# Patient Record
(demographics unavailable — no encounter records)

---

## 2017-08-17 NOTE — DIAGNOSTIC IMAGING REPORT
HEAD WITHOUT CONTRAST (CT)



CLINICAL HISTORY: 75 years-old Female presenting with EVALUATE ALTERED MENTAL

STATUS/WEAKNESS. 



TECHNIQUE: Multidetector CT imaging of the head was performed technique 1 IV

contrast: None. A dose lowering technique was used consistent with the

principles of ALARA (as low as reasonably achievable). 



COMPARISON:  2/7/2013.



CT DOSE (mGy.cm): The estimated cumulative dose is 638.56 mGycm.



FINDINGS: 



 topogram: Unremarkable.



Ventricles and sulci normal in size. Brain parenchyma normal in appearance with

preserved gray-white differentiation. No mass effect or midline shift. No

hemorrhage or acute territorial infarct. No extra-axial fluid collection.

Paranasal sinuses and mastoid air cells clear. Calvarium intact.    



IMPRESSION:

1.  No acute intracranial pathology. 







Electronically signed by:  Joseph Henley M.D.

8/17/2017 9:46 PM



Dictated Date/Time:  8/17/2017 9:44 PM

## 2017-08-17 NOTE — EMERGENCY ROOM VISIT NOTE
History


Report prepared by Angela:  Shazia Mclean


Under the Supervision of:  Dr. Wilbert Reed M.D.


First contact with patient:  20:31


Chief Complaint:  FALL


Stated Complaint:  FALL - LEGS GAVE OUT





History of Present Illness


The patient is a 75 year old female who presents to the Emergency Room with 

complaints of a fall that occurred around 1400 today. She reports when she 

first woke up this morning, both of her legs felt weak and she had a hard time 

getting out of bed. She was able to get to routine checkup with her PCP, Dr. Marcos. She reports she discussed her weakness with Dr. Marcos. After the 

appointment, she went to get gas when her bilateral legs "gave out" and she 

fell to the ground. She was unable to get herself up and had to wait for EMS to 

come help her. She refused to come to the ED at that time and states she was 

able to get home, where her family encouraged her to come to the ED. She denies 

hitting her head or any loss of consciousness She notes she was recently placed 

on a new medication by Dr. Moreno, her Nephrologist. The patient denies any 

recent fevers, cough or congestion or urinary symptoms.  She admits to a 

history of diabetes mellitus and neuropathy but denies any new pain or numbness 

in her legs.





   Source of History:  patient


   Onset:  1400 today


   Position:  other (global)


   Quality:  other (fall)


   Timing:  resolved


   Associated Symptoms:  No fevers, No cough (cough or congestion), No urinary 

symptoms, No numbness (bilateral legs)





Review of Systems


See HPI for pertinent positives & negatives. A total of 10 systems reviewed and 

were otherwise negative.





Past Medical & Surgical


Medical Problems:


(1) Benign hypertension


(2) Chronic kidney disease stage 3


(3) Coronary artery disease


(4) Diabetes mellitus type 2


(5) Dyslipidemia


(6) Glaucoma


(7) Gout


(8) Morbid obesity


(9) Neuropathy








Social History


Smoking Status:  Never Smoker


Drug Use:  none


Marital Status:  


Housing Status:  lives with family


Occupation Status:  retired





Current/Historical Medications


Scheduled


Allopurinol (Zyloprim), 300 MG PO QAM


Aspirin Enteric Coated (Ecotrin Or Generic), 81 MG PO QAM


Atorvastatin (Lipitor), 80 MG PO QAM


Furosemide (Lasix), 40 MG PO HS


Furosemide (Lasix), 80 MG PO QAM


Gabapentin (Neurontin), 600 MG PO QAM


Gabapentin (Neurontin), 900 MG PO HS


Insulin Aspart (Novolog Penfill), 14 UNITS SQ QA


Insulin Aspart (Novolog Penfill), 16 UNITS SQ LUNCH


Insulin Aspart (Novolog Penfill), 18 UNITS SQ SUPPER


Insulin Glargine (Lantus Solostar), 50 UNITS SQ QAM


Insulin Glargine (Lantus Solostar), 60 SC QPM


Lorazepam (Lorazepam), 2 MG PO HS


Metoprolol Succinate (Toprol Xl), 12.5 MG PO QAM


Nitroglycerin (Nitrostat), 0.4 MG UT PRN


Potassium Chloride (K-Tab), 3 TABS PO BID





Scheduled PRN


Metolazone (Zaroxolyn), 5 MG PO DAILY PRN for SWELLING


Nystatin (Nystatin Cream), 1 APPLN TOP BID PRN for RASH


Oxycodone/Acetaminophen 5MG/325MG (Percocet 5MG/325MG), 1 TABLET PO Q6H PRN for 

Pain





Allergies


Coded Allergies:  


     Sulfamethoxazole w/Trimethoprim (Verified  Allergy, Unknown, RASH, 8/17/17)





Physical Exam


Vital Signs











  Date Time  Temp Pulse Resp B/P (MAP) Pulse Ox O2 Delivery O2 Flow Rate FiO2


 


8/17/17 23:51  52 18 142/53 99 Room Air  


 


8/17/17 22:44  73      


 


8/17/17 22:00  50 18  98 Room Air  


 


8/17/17 21:57    119/63 98 Room Air  


 


8/17/17 21:45  58 16     


 


8/17/17 20:46  45      


 


8/17/17 20:44     98 Room Air  


 


8/17/17 20:40  50 16 138/45 98 Room Air  


 


8/17/17 18:54 36.8 59 20 131/48 96 Room Air  











Physical Exam


GENERAL: Patient is in no acute distress.


HEENT: No acute trauma, normocephalic atraumatic, mucous membranes moist, no 

nasal congestion, no scleral icterus.


NECK: No stridor, no adenopathy, no meningismus, trachea is midline.


LUNGS: Clear to auscultation bilaterally, no wheeze, no rhonchi, breath sounds 

equal.


HEART: Without murmurs gallops or rubs, bradycardic with a regular rhythm.


ABDOMEN: Soft, nontender, bowel sounds positive, no hernias, no peritonitis.


EXTREMITIES: No cyanosis or edema, full range of motion of all the joints 

without pain or difficulty, no signs for acute trauma.


NEUROLOGIC: Oriented x 3, no acute motor or sensory deficits, no focal weakness.


SKIN: No rash, no jaundice, no diaphoresis.





Medical Decision & Procedures


ER Provider


Diagnostic Interpretation:


Radiology results as stated below per my review and radiologist interpretation:





CHEST ONE VIEW PORTABLE





CLINICAL HISTORY: 75 years-old Female presenting with EVALUATE ALTERED MENTAL


STATUS/WEAKNESS. 





TECHNIQUE: Portable upright AP view of the chest was obtained.





COMPARISON:  4/22/2012





FINDINGS:


Atherosclerosis of the aortic arch. Cardiac silhouette within normal limits.


Lungs and pleural spaces clear. Osseous structures normal. Upper abdomen normal.





IMPRESSION:


1.  No acute cardiopulmonary disease.





Electronically signed by:  Joseph Henley M.D.


8/17/2017 9:17 PM








HEAD WITHOUT CONTRAST (CT)





CLINICAL HISTORY: 75 years-old Female presenting with EVALUATE ALTERED MENTAL


STATUS/WEAKNESS. 





TECHNIQUE: Multidetector CT imaging of the head was performed technique 1 IV


contrast: None. A dose lowering technique was used consistent with the


principles of ALARA (as low as reasonably achievable). 





COMPARISON:  2/7/2013.





CT DOSE (mGy.cm): The estimated cumulative dose is 638.56 mGycm.





FINDINGS: 





 topogram: Unremarkable.





Ventricles and sulci normal in size. Brain parenchyma normal in appearance with


preserved gray-white differentiation. No mass effect or midline shift. No


hemorrhage or acute territorial infarct. No extra-axial fluid collection.


Paranasal sinuses and mastoid air cells clear. Calvarium intact.    





IMPRESSION:


1.  No acute intracranial pathology. 





Electronically signed by:  Joseph Henley M.D.


8/17/2017 9:46 PM





Laboratory Results











Test


  8/17/17


20:21 8/17/17


22:02


 


Total Bilirubin


  0.5 mg/dl


(0.2-1) 


 


 


Alanine Aminotransferase


(ALT/SGPT) 35 U/L (12-78) 


  


 


 


Alkaline Phosphatase


  67 U/L


() 


 


 


Total Protein


  7.8 gm/dl


(6.4-8.2) 


 


 


Albumin


  3.5 gm/dl


(3.4-5.0) 


 


 


Globulin


  4.3 gm/dl


(2.5-4.0) 


 


 


Albumin/Globulin Ratio 0.8 (0.9-2)  


 


Thyroid Stimulating Hormone


(TSH) 2.230 uIu/ml


(0.300-4.500) 


 


 


Prothrombin Time


  


  10.6 SECONDS


(9.0-12.0)


 


Prothromb Time International


Ratio 


  1.0 (0.9-1.1) 


 


 


Activated Partial


Thromboplast Time 


  26.8 SECONDS


(21.0-31.0)


 


Partial Thromboplastin Ratio  1.0 


 


Magnesium Level


  


  2.5 mg/dl


(1.8-2.4)


 


Aspartate Amino Transf


(AST/SGOT) 


  34 U/L (15-37) 


 


 


Chemistry Specimen Hemolysis   





Laboratory results reviewed by me.





Medications Administered











 Medications


  (Trade)  Dose


 Ordered  Sig/Will


 Route  Start Time


 Stop Time Status Last Admin


Dose Admin


 


 Sodium Chloride  500 ml @ 


 999 mls/hr  Q31M STAT


 IV  8/17/17 20:39


 8/17/17 21:09 DC 8/17/17 20:39


999 MLS/HR


 


 Sodium Chloride  500 ml @ 


 999 mls/hr  Q31M STAT


 IV  8/17/17 21:52


 8/17/17 22:22 DC 8/17/17 21:52


999 MLS/HR


 


 Albuterol Sulfate


  (Ventolin 0.083%


 2.5MG/3ML Neb)  2.5 mg  NOW  STAT


 INH  8/17/17 22:35


 8/17/17 22:39 DC 8/17/17 22:53


2.5 MG


 


 Calcium Gluconate


 1000 mg/Sodium


 Chloride  60 ml @ 


 240 mls/hr  2345  ONCE


 IV  8/17/17 23:45


 8/17/17 23:59 DC 8/17/17 23:42


240 MLS/HR


 


 Dextrose


  (Dextrose 50%


 50ML Syringe)  50 ml  2330  ONCE


 IV  8/17/17 23:30


 8/17/17 23:31 DC 8/17/17 23:42


50 ML


 


 Insulin Human


 Regular 10 units/


 Syringe  10 ml @ 10


 mls/min  2335  ONCE


 IV  8/17/17 23:35


 8/17/17 23:36 DC 8/17/17 23:45


10 MLS/MIN











ECG


Indication:  weakness


Rate (beats per minute):  48


Rhythm:  junctional


Findings:  other (Poor R-wave progression)


Comparison ECG Date:  Compared to EKG from 9/12/12, junctional rhythm is now 

present





ED Course


2033: The patient was evaluated in room B3. A complete history and physical 

exam was performed.





2039:  ml @ 999 mls/hr IV,  ml @ 999 mls/hr IV. 





2235: Albuterol Sulfate 2.5 mg INH. 





2245: I reevaluated the patient. She is resting comfortably. I discussed my 

recommendation she remain in the hospital for further evaluation and management 

and she verbalized complete understanding and agreement.





2250: I discussed the patients case with Althea Devi. The 

patient will be further evaluated.





Medical Decision


The differential diagnoses considered include stroke or intracranial bleeding, 

debilitation, anemia, electrolyte imbalance, infection, UTI, Guillain [place] 

Syndrome, MI, thyroid disorder and dysrhythmia. 





There is no leukocytosis or concerning anemia.  Renal panel testing shows 

hyperkalemia with a potassium of 6.  There is some acute renal failure with a 

high creatinine at 2.8.  No evidence for hepatitis.  The patient appears to be 

in a euthyroid state.  EKG shows a junctional rhythm with a rate of 48.  No 

acute ischemia.  Cardiac enzyme testing times one does show a slight elevation 

to the troponin consistent with possible cardiac injury/strain.  Chest film 

does not show pneumonia or CHF.  Brain CT shows no acute bleed or mass effect.  

There is no coagulopathy.  Total CK slightly elevated but not high enough to 

diagnose rhabdomyolysis.





The patient was given an albuterol neb, she received IV saline.  These 2 

interventions were performed because of the hyperkalemia.





The patient presents with leg weakness.  She is in a junctional bradycardia and 

somewhat hyperkalemic.  There is some acute renal failure.  Admission/

observation is warranted.  I spoke with the patient and case management.  I 

spoke to the on-call hospitalist.  The patient's potassium will need to be 

followed closely.  She may require further interventions for the potassium 

based on the next potassium value.





Medication Reconcilliation


Current Medication List:  was personally reviewed by me





Blood Pressure Screening


Patient's blood pressure:  Normal blood pressure


Blood pressure disposition:  Did not require urgent referral





Consults


Time Called:  2248


Consulting Physician:  Althea Devi


Returned Call:  2250


I discussed the patients case with Althea Devi. The 

patient will be further evaluated.





Impression





 Primary Impression:  


 Acute renal failure


 Additional Impressions:  


 Weakness


 Hyperkalemia


 Bradycardia





Scribe Attestation


The scribe's documentation has been prepared under my direction and personally 

reviewed by me in its entirety. I confirm that the note above accurately 

reflects all work, treatment, procedures, and medical decision making performed 

by me.





Departure Information


Dispostion


Being Evaluated By Hospitalist





Referrals


Dedra Marcos M.D. (PCP)





Patient Instructions


[facility]





Problem Qualifiers

## 2017-08-17 NOTE — DIAGNOSTIC IMAGING REPORT
CHEST ONE VIEW PORTABLE



CLINICAL HISTORY: 75 years-old Female presenting with EVALUATE ALTERED MENTAL

STATUS/WEAKNESS. 



TECHNIQUE: Portable upright AP view of the chest was obtained.



COMPARISON:  4/22/2012



FINDINGS:

Atherosclerosis of the aortic arch. Cardiac silhouette within normal limits.

Lungs and pleural spaces clear. Osseous structures normal. Upper abdomen normal.



IMPRESSION:

1.  No acute cardiopulmonary disease.







Electronically signed by:  Joseph Henley M.D.

8/17/2017 9:17 PM



Dictated Date/Time:  8/17/2017 9:16 PM

## 2017-08-18 NOTE — NEPHROLOGY CONSULTATION
Nephrology Consultation


Date & Providers





Date of Consultation:  Aug 18, 2017.





Primary Care Provider:  Dedra Marcos M.D.





Referring Provider:





Reason for Consultation


Acute renal insufficiency, hyperkalemia





History of Present Illness


Mrs. Radah Mantilla was seen and evaluated in her hospital room this morning.  I 

discussed the case and the plan of care with Dr. Mathis this morning.  Radha 

has baseline CKD IV.  Baseline creatinine has been 1.7 - 2.1.  CKD has been 

attributed to diabetic nephropathy.  





She follows in the nephrology clinic with Dr. Moreno.  Radha was seen in the 

nephrology clinic on July 28.  She has lymphedema requiring PT treatments in 

the past as well as a history of hypokalemia associated with loop diuretics.  

Furosemide was reduced from 80 mg BID to 40 mg BID.  Potassium chloride reduced 

from 80 mEq BID to 40 mEq BID.  She started spironolactone 100 mg daily.  She 

states that she was tolerating the medication well at first.  She had repeat 

blood work on August 15 that documented a creatinine of 1.9 with a serum 

potassium of 5.4 mmol/L. 





Yesterday, she presented to Dr. Marcos's office with lower extremity weakness 

and recurrent falls.  She reports decreased intake of fluids and mild malaise 

over a few days.  Her weight and edema had been stable.  She denied any change 

in urine output.  No significant dietary changes were identified.   





Repeat laboratory studies were notable for DAVEY with a serum creatinine of 2.8 

as well as hyperkalemia at 6.0 mmol/L.  Radha presented to the ED where she 

was found to be in a junctional bradycardia.  She was placed on telemetry 

monitoring and started on a saline infusion.  Hyperkalemia was managed with 

Kayexalate, calcium gluconate and insulin. 





This morning, Radha was resting comfortably in bed.  She denies any 

lightheadedness, dizziness, syncope or presyncope.  She denies chest pain or 

palpitations.  Medical records from the inpatient and outpatient EMR as well as 

cardiology consultation were reviewed this morning.





Past Medical/Surgical History


Medical:


Anxiety disorder


Chronic kidney disease, stage IV


Coronary artery disease


Diabetes mellitus 


Lymphedema


Gout


Hyperlipidemia


Hypertension


Hypokalemia


Morbid obesity


Vitamin D deficiency


Surgical:


None reported








Allergies


Coded Allergies:  


     Sulfamethoxazole w/Trimethoprim (Verified  Allergy, Unknown, RASH, 8/17/17)





Inpatient Medications





Current Inpatient Medications








 Medications


  (Trade)  Dose


 Ordered  Sig/Will


 Route  Start Time


 Stop Time Status Last Admin


Dose Admin


 


 Heparin Sodium


  (Porcine)


  (Heparin Sq 5000


 Unit/0.5ml)  5,000 unit  Q8


 SQ  8/18/17 06:00


 9/17/17 05:59  8/18/17 05:26


5,000 UNIT


 


 Sodium Chloride  1,000 ml @ 


 75 mls/hr  [id number] ONCE


 IV  8/18/17 01:30


 8/18/17 14:49  8/18/17 03:43


75 MLS/HR


 


 Acetaminophen


  (Tylenol Tab)  650 mg  Q4H  PRN


 PO  8/18/17 01:30


 9/17/17 01:29   


 


 


 Nitroglycerin


  (Nitrostat Tab)  0.4 mg  UD  PRN


 SL  8/18/17 01:30


 9/17/17 01:29   


 


 


 Insulin Aspart


  (novoLOG ASPART)  **SLIDING


 SCALE**


 **If C...  ACHS


 SC  8/18/17 07:00


 9/17/17 06:59  8/18/17 11:35


1 UNITS


 


 Glucose


  (Glucose 40% Gel)  15-30


 GRAMS 15


 GRAMS...  UD  PRN


 PO  8/18/17 01:30


 9/17/17 01:29   


 


 


 Glucose


  (Glucose Chew


 Tab)  4-8


 Tablets 4


 Tabl...  UD  PRN


 PO  8/18/17 01:30


 9/17/17 01:29   


 


 


 Dextrose


  (Dextrose 50%


 50ML Syringe)  25-50ML OF


 50% DW IV


 FOR...  UD  PRN


 IV  8/18/17 01:30


 9/17/17 01:29   


 


 


 Glucagon


  (Glucagon Inj)  1 mg  UD  PRN


 SQ  8/18/17 01:30


 9/17/17 01:29   


 


 


 Hydromorphone HCl


  (Dilaudid Inj)  0.5 mg  Q3H  PRN


 IV  8/18/17 01:30


 9/1/17 01:29   


 


 


 Allopurinol


  (Zyloprim Tab)  300 mg  QAM


 PO  8/18/17 09:00


 9/17/17 08:59  8/18/17 08:06


300 MG


 


 Aspirin


  (Ecotrin Tab)  81 mg  QAM


 PO  8/18/17 09:00


 9/17/17 08:59  8/18/17 08:05


81 MG


 


 Gabapentin


  (Neurontin Cap)  200 mg  BID


 PO  8/18/17 09:00


 9/17/17 08:59  8/18/17 08:06


200 MG


 


 Lorazepam


  (Ativan Tab)  0.5 mg  HS  PRN


 PO  8/18/17 01:30


 9/17/17 01:29   


 


 


 Oxycodone/


 Acetaminophen


  (Percocet


 5-325mg Tab)  1 tab  Q6H  PRN


 PO  8/18/17 01:30


 9/1/17 01:29   


 


 


 Insulin Glargine


  (Lantus Solostar


 Pen)  5 units  BID


 SC  8/18/17 09:00


 9/17/17 08:59  8/18/17 08:14


5 UNITS











Social History


Smoking Status:  Never Smoker


Drug Use:  none


Marital Status:  


Housing Status:  lives alone


Occupation:  retired





Review of Systems


A complete review of systems was performed.  Pertinent positives are noted 

above. All other systems are negative.





Physical Exam











  Date Time  Temp Pulse Resp B/P (MAP) Pulse Ox O2 Delivery O2 Flow Rate FiO2


 


8/18/17 11:59 36.6 58 16 165/77 (106) 99 Room Air  


 


8/18/17 08:02 36.4 46 18 135/64 (87) 97   


 


8/18/17 08:00      Room Air  


 


8/18/17 04:30 36.9 36 18 118/76 (90) 98   





  43  150/77 (101)    





  53  153/80 (104)    


 


8/18/17 01:39 36.5 48 17 151/70 98 Room Air  


 


8/17/17 23:51  52 18 142/53 99 Room Air  


 


8/17/17 22:44  73      


 


8/17/17 22:00  50 18  98 Room Air  


 


8/17/17 21:57    119/63 98 Room Air  


 


8/17/17 21:45  58 16     


 


8/17/17 20:46  45      


 


8/17/17 20:44     98 Room Air  


 


8/17/17 20:40  50 16 138/45 98 Room Air  


 


8/17/17 18:54 36.8 59 20 131/48 96 Room Air  








General Appearance:  no apparent distress, + obese


Head:  normocephalic, atraumatic


Eyes:  normal inspection, sclerae normal


ENT:  normal ENT inspection, pharynx normal


Neck:  supple, + pertinent finding (thick, no JVD)


Respiratory/Chest:  no respiratory distress, no accessory muscle use, + 

decreased breath sounds


Cardiovascular:  no edema, no gallop, + bradycardia


Abdomen/GI:  non tender, soft, + pertinent finding (obese)


Extremities/Musculoskelatal:  normal inspection, + pedal edema


Neurologic/Psych:  alert, normal mood/affect, oriented x 3


Skin:  normal color





Laboratory Results





Last 24 Hours








Test


  8/17/17


20:21 8/17/17


22:02 8/18/17


03:15 8/18/17


03:37


 


Sodium Level 133 mmol/L   136 mmol/L  


 


Potassium Level  mmol/L  6.0 mmol/L  5.5 mmol/L  


 


Chloride Level 102 mmol/L   105 mmol/L  


 


Carbon Dioxide Level 25 mmol/L   26 mmol/L  


 


Anion Gap 6.0 mmol/L   5.0 mmol/L  


 


Blood Urea Nitrogen 96 mg/dl   88 mg/dl  


 


Creatinine 2.80 mg/dl   2.40 mg/dl  


 


Est Creatinine Clear Calc


Drug Dose 20.4 ml/min 


  


  23.4 ml/min 


  


 


 


Estimated GFR (


American) 18.4 


  


  22.1 


  


 


 


Estimated GFR (Non-


American 15.9 


  


  19.1 


  


 


 


BUN/Creatinine Ratio 34.2   36.8  


 


Random Glucose 175 mg/dl   161 mg/dl  


 


Calcium Level 10.2 mg/dl   9.9 mg/dl  


 


Magnesium Level  mg/dl  2.5 mg/dl   


 


Total Bilirubin 0.5 mg/dl    


 


Aspartate Amino Transf


(AST/SGOT)  U/L 


  34 U/L 


  


  


 


 


Alanine Aminotransferase


(ALT/SGPT) 35 U/L 


  


  


  


 


 


Alkaline Phosphatase 67 U/L    


 


Total Creatine Kinase  U/L  690 U/L  731 U/L  


 


Troponin I 0.046 ng/ml   0.059 ng/ml  


 


Total Protein 7.8 gm/dl    


 


Albumin 3.5 gm/dl    


 


Globulin 4.3 gm/dl    


 


Albumin/Globulin Ratio 0.8    


 


Thyroid Stimulating Hormone


(TSH) 2.230 uIu/ml 


  


  


  


 


 


White Blood Count  7.55 K/uL  7.70 K/uL  


 


Red Blood Count  4.74 M/uL  4.24 M/uL  


 


Hemoglobin  14.7 g/dL  13.0 g/dL  


 


Hematocrit  44.0 %  39.3 %  


 


Mean Corpuscular Volume  92.8 fL  92.7 fL  


 


Mean Corpuscular Hemoglobin  31.0 pg  30.7 pg  


 


Mean Corpuscular Hemoglobin


Concent 


  33.4 g/dl 


  33.1 g/dl 


  


 


 


Platelet Count  160 K/uL  154 K/uL  


 


Mean Platelet Volume  11.4 fL  12.0 fL  


 


Neutrophils (%) (Auto)  64.0 %  64.4 %  


 


Lymphocytes (%) (Auto)  24.4 %  22.6 %  


 


Monocytes (%) (Auto)  10.1 %  10.6 %  


 


Eosinophils (%) (Auto)  1.3 %  1.8 %  


 


Basophils (%) (Auto)  0.1 %  0.3 %  


 


Neutrophils # (Auto)  4.83 K/uL  4.96 K/uL  


 


Lymphocytes # (Auto)  1.84 K/uL  1.74 K/uL  


 


Monocytes # (Auto)  0.76 K/uL  0.82 K/uL  


 


Eosinophils # (Auto)  0.10 K/uL  0.14 K/uL  


 


Basophils # (Auto)  0.01 K/uL  0.02 K/uL  


 


RDW Standard Deviation  49.5 fL  49.4 fL  


 


RDW Coefficient of Variation  14.6 %  14.7 %  


 


Immature Granulocyte % (Auto)  0.1 %  0.3 %  


 


Immature Granulocyte # (Auto)  0.01 K/uL  0.02 K/uL  


 


Prothrombin Time  10.6 SECONDS   


 


Prothromb Time International


Ratio 


  1.0 


  


  


 


 


Activated Partial


Thromboplast Time 


  26.8 SECONDS 


  


  


 


 


Partial Thromboplastin Ratio  1.0   


 


Chemistry Specimen Hemolysis     


 


Bedside Glucose    147 mg/dl 


 


Test


  8/18/17


06:37 8/18/17


08:18 


  


 


 


Bedside Glucose 176 mg/dl    


 


Sodium Level  135 mmol/L   


 


Potassium Level  5.1 mmol/L   


 


Chloride Level  103 mmol/L   


 


Carbon Dioxide Level  26 mmol/L   


 


Anion Gap  6.0 mmol/L   


 


Blood Urea Nitrogen  89 mg/dl   


 


Creatinine  2.20 mg/dl   


 


Est Creatinine Clear Calc


Drug Dose 


  25.5 ml/min 


  


  


 


 


Estimated GFR (


American) 


  24.6 


  


  


 


 


Estimated GFR (Non-


American 


  21.2 


  


  


 


 


BUN/Creatinine Ratio  40.6   


 


Random Glucose  207 mg/dl   


 


Calcium Level  10.2 mg/dl   


 


Troponin I  0.049 ng/ml   











Impression





(1) Acute renal insufficiency


(2) Chronic kidney disease, stage IV (severe)


(3) Hyperkalemia


(4) Lymphedema


(5) Coronary artery disease


(6) Junctional bradycardia


Radha Mantilla is a 75-year-old female with obesity, diabetes mellitus, coronary 

artery disease and CKD IV.  She has chronic lower extremity lymphedema 

requiring large doses of diuretics to maintain euvolemia.  She also has a 

history of loop diuretic associated hypokalemia.  She was admitted with 

hyperkalemia, DAVEY and junctional bradycardia.  Baseline heart rate by review of 

clinic records has been 60-70 bpm.  Radha is clinically improving with medical 

management.





Hyperkalemia was managed with 30 g Kayexalate.  She also received IV calcium 

gluconate and insulin.  She was started on a saline infusion.  Diuretics have 

been held.  Oral KCl supplement stopped.  She was not taking and ACE and denied 

NSAID use.  Dietary potassium restriction was reviewed.





At this time, I would suggest stopping MIVF.  I would use furosemide 40 mg IV 

as needed to encourage a slightly negative fluid balance.  She should be 

maintained on a potassium restricted diet.  Metabolic profile will be monitored 

q 12 hours.  I/O's will be documented.  





Medications are appropriate for renal function.  Given DAVEY, I will repeat UA/

microscopy.  Given that creatinine is improving, will hold on renal imaging for 

now.





Recommendations


DAVEY:


-- Check UA/microscopy


-- Document I/O's





Hyperkalemia:


-- Hold spironolactone and KCl


-- Monitor q 12 hours


-- K restrict diet to 2000 mg daily





Junctional bradycardia:


-- Per cardiology





Lymphedema:


-- Stop NS infusion


-- Maintain slightly negative fluid balance

## 2017-08-18 NOTE — PROGRESS NOTE
Internal Med Progress Note


Date of Service:


Aug 18, 2017.


Provider Documentation:





jones presented with fall, hyperkalemia, arf on ckd stage 4 and junctional 

bradycardia. Received iv insulin with dextrose, calcium gluconate and 

Kayexalate for hyperkalemia. potassium supplements, Aldactone  held. Was given 

fluids. Hyperkalemia resolved. patient is doing fine. No complaints. Improving. 

f/u labs. Appreciate cardiology and nephrology help.


ASSESSMENT & PLAN:


[]





DVT PROPHYLAXIS


[]





DISPOSITION


[]


Vital Signs:











  Date Time  Temp Pulse Resp B/P (MAP) Pulse Ox O2 Delivery O2 Flow Rate FiO2


 


8/18/17 15:48 36.4 58 22 150/69 (96) 98 Room Air  


 


8/18/17 12:00      Room Air  


 


8/18/17 11:59 36.6 58 16 165/77 (106) 99 Room Air  


 


8/18/17 08:02 36.4 46 18 135/64 (87) 97   


 


8/18/17 08:00      Room Air  


 


8/18/17 04:30 36.9 36 18 118/76 (90) 98   





  43  150/77 (101)    





  53  153/80 (104)    


 


8/18/17 01:39 36.5 48 17 151/70 98 Room Air  


 


8/17/17 23:51  52 18 142/53 99 Room Air  


 


8/17/17 22:44  73      


 


8/17/17 22:00  50 18  98 Room Air  


 


8/17/17 21:57    119/63 98 Room Air  


 


8/17/17 21:45  58 16     


 


8/17/17 20:46  45      


 


8/17/17 20:44     98 Room Air  


 


8/17/17 20:40  50 16 138/45 98 Room Air  


 


8/17/17 18:54 36.8 59 20 131/48 96 Room Air  








Lab Results:





Results Past 24 Hours








Test


  8/17/17


20:21 8/17/17


22:02 8/18/17


03:15 8/18/17


03:37 Range/Units


 


 


Sodium Level 133  136  136-145  mmol/L


 


Potassium Level  6.0 5.5  3.5-5.1  mmol/L


 


Chloride Level 102  105    mmol/L


 


Carbon Dioxide Level 25  26  21-32  mmol/L


 


Anion Gap 6.0  5.0  3-11  mmol/L


 


Blood Urea Nitrogen 96  88  7-18  mg/dl


 


Creatinine


  2.80


  


  2.40


  


  0.60-1.20


mg/dl


 


Est Creatinine Clear Calc


Drug Dose 20.4


  


  23.4


  


   ml/min


 


 


Estimated GFR (


American) 18.4


  


  22.1


  


   


 


 


Estimated GFR (Non-


American 15.9


  


  19.1


  


   


 


 


BUN/Creatinine Ratio 34.2  36.8  10-20  


 


Random Glucose 175  161  70-99  mg/dl


 


Calcium Level 10.2  9.9  8.5-10.1  mg/dl


 


Magnesium Level  2.5   1.8-2.4  mg/dl


 


Total Bilirubin 0.5    0.2-1  mg/dl


 


Aspartate Amino Transf


(AST/SGOT) 


  34


  


  


  15-37  U/L


 


 


Alanine Aminotransferase


(ALT/SGPT) 35


  


  


  


  12-78  U/L


 


 


Alkaline Phosphatase 67      U/L


 


Total Creatine Kinase  690 731    U/L


 


Troponin I 0.046  0.059  0-0.045  ng/ml


 


Total Protein 7.8    6.4-8.2  gm/dl


 


Albumin 3.5    3.4-5.0  gm/dl


 


Globulin 4.3    2.5-4.0  gm/dl


 


Albumin/Globulin Ratio 0.8    0.9-2  


 


Thyroid Stimulating Hormone


(TSH) 2.230


  


  


  


  0.300-4.500


uIu/ml


 


White Blood Count  7.55 7.70  4.8-10.8  K/uL


 


Red Blood Count  4.74 4.24  4.2-5.4  M/uL


 


Hemoglobin  14.7 13.0  12.0-16.0  g/dL


 


Hematocrit  44.0 39.3  37-47  %


 


Mean Corpuscular Volume  92.8 92.7    fL


 


Mean Corpuscular Hemoglobin  31.0 30.7  25-34  pg


 


Mean Corpuscular Hemoglobin


Concent 


  33.4


  33.1


  


  32-36  g/dl


 


 


Platelet Count  160 154  130-400  K/uL


 


Mean Platelet Volume  11.4 12.0  7.4-10.4  fL


 


Neutrophils (%) (Auto)  64.0 64.4   %


 


Lymphocytes (%) (Auto)  24.4 22.6   %


 


Monocytes (%) (Auto)  10.1 10.6   %


 


Eosinophils (%) (Auto)  1.3 1.8   %


 


Basophils (%) (Auto)  0.1 0.3   %


 


Neutrophils # (Auto)  4.83 4.96  1.4-6.5  K/uL


 


Lymphocytes # (Auto)  1.84 1.74  1.2-3.4  K/uL


 


Monocytes # (Auto)  0.76 0.82  0.11-0.59  K/uL


 


Eosinophils # (Auto)  0.10 0.14  0-0.5  K/uL


 


Basophils # (Auto)  0.01 0.02  0-0.2  K/uL


 


RDW Standard Deviation  49.5 49.4  36.4-46.3  fL


 


RDW Coefficient of Variation  14.6 14.7  11.5-14.5  %


 


Immature Granulocyte % (Auto)  0.1 0.3   %


 


Immature Granulocyte # (Auto)  0.01 0.02  0.00-0.02  K/uL


 


Prothrombin Time


  


  10.6


  


  


  9.0-12.0


SECONDS


 


Prothromb Time International


Ratio 


  1.0


  


  


  0.9-1.1  


 


 


Activated Partial


Thromboplast Time 


  26.8


  


  


  21.0-31.0


SECONDS


 


Partial Thromboplastin Ratio  1.0    


 


Chemistry Specimen Hemolysis      


 


Bedside Glucose    147 70-90  mg/dl


 


Test


  8/18/17


06:37 8/18/17


08:18 8/18/17


11:29 8/18/17


16:06 Range/Units


 


 


Bedside Glucose 176  197  70-90  mg/dl


 


Sodium Level  135   136-145  mmol/L


 


Potassium Level  5.1  4.8 3.5-5.1  mmol/L


 


Chloride Level  103     mmol/L


 


Carbon Dioxide Level  26   21-32  mmol/L


 


Anion Gap  6.0   3-11  mmol/L


 


Blood Urea Nitrogen  89   7-18  mg/dl


 


Creatinine


  


  2.20


  


  


  0.60-1.20


mg/dl


 


Est Creatinine Clear Calc


Drug Dose 


  25.5


  


  


   ml/min


 


 


Estimated GFR (


American) 


  24.6


  


  


   


 


 


Estimated GFR (Non-


American 


  21.2


  


  


   


 


 


BUN/Creatinine Ratio  40.6   10-20  


 


Random Glucose  207   70-99  mg/dl


 


Calcium Level  10.2   8.5-10.1  mg/dl


 


Troponin I  0.049   0-0.045  ng/ml


 


Test


  8/18/17


16:22 


  


  


  Range/Units


 


 


Bedside Glucose 227    70-90  mg/dl

## 2017-08-18 NOTE — HISTORY & PHYSICAL EXAMINATION
DATE OF ADMISSION:  2017

 

PRIMARY CARE DOCTOR:  Dr. Marcos.

 

CHIEF COMPLAINT:  Fall.

 

HISTORY OF PRESENT ILLNESS:  



History obtained from patient and records.



Medical history significant for CAD post-stenting, chronic diastolic heart 
failure, EF of 55-59%

(2D echo 2017 EF 70%, hypertension, DM2  insulin requiring,

hyperlipidemia, gout, chronic renal insufficiency (baseline creatinine 1.7 to 
2.1 as per records)



Recent confinement last 2012 for

acute renal failure, volume overload, bradycardia. 



Recent [facility] outpatient visit last month.

Patient noted to be fluid overloaded.  

Diuretics deferred to nephrology as per note.

Felodipine stopped for possible contribution to leg swelling.

Imdur dose increased.

Patient stopped taking Imdur as she became sicker

after 2 days of treatment after notifying her cardiologist. 



24-hour Holter monitor contemplated to assess for significant bradyarrhythmias 

given complaints of chronic fatigue. Planned evaluation for obstructive sleep 
apnea

as well.  

Conclusions from Holter monitor done  as follows : 

dominant rhythm sinus rhythm, frequent PVCs noted, bigeminy.  

No symptoms during episode.  



Patient seen by [facility] about 3 weeks ago.  

leg swelling improving on diuretic as per note.

Outpatient potassium noted to be low.  Kidney function stable at that time,

Furosemide decreased to 40 mg twice daily, spironolactone started.  

Patient encouraged to eat bananas as per patient.

KCl supplements prescribed.  



Yesterday the patient felt weak when she got out of bed.  

No chest pain, no shortness of breath, no headache,

some dizziness.  Good appetite.

Weight loss, decreased leg swelling with home diuretic regimen.



Patient directed by PCP to the Emergency Room.  

 

MEDICAL HISTORY:  As above.  

2D echo done  2017 showed concentric LVH, basal septal thickened, sigmoid 
septum

moderate LA enlargement, mild MR.  Mild aortic valve regurgitation.

Severe mitral annular calcification.  Normal right ventricular size.  



SURGICAL HISTORY:   section, orthopedic procedures,

sphincterotomy.

 

HOME MEDICATIONS:  Include Zyloprim, aspirin, Lipitor, Lasix, Neurontin,

NovoLog, Lantus, lorazepam, Toprol, Zaroxolyn, Nystatin, Nitrostat, Percocet,

K-Tab.  

 

ALLERGIES:  BACTRIM.  

 

FAMILY HISTORY:  Heart disease, stroke, diabetes.

 

PERSONAL AND SOCIAL HISTORY:  Nonsmoker, no chronic intake of alcoholic

beverages.  Lives alone.  Daughters live close by.

 

REVIEW OF SYSTEMS:  As per HPI, all other ROS negative.

 

PHYSICAL EXAMINATION:

VITAL SIGNS:  Blood pressure was noted to be 131/48, pulse rate of 45, RR 16,

T 37 O2 sats 98 room air.  

GENERAL:  Pleasant, no respiratory distress, obese.  Looks younger for stated

age.

SKIN:   Normal color.  

HEAD, EYES, EARS, NOSE, AND THROAT:  Pink palpebral conjunctivae, dry buccal 
mucosa 

NECK:  Short neck.

LUNGS:  Decreased effort.

HEART:  Bradycardic.

ABDOMEN:  Soft.  NT

EXTREMITIES:  Some LE edema.  No tenderness.  

NEUROLOGIC:  No gross focality.

 

LABORATORY DATA:  Hemoglobin was noted to be 14.7, hematocrit 44, 

platelets 160, sodium 133, potassium 6, chloride 102, CO2 25, BUN 20,

creatinine 2.8, glucose 175.  Troponin noted to be 0.04.  



EKG as per my interpretation junctional rhythm.  



Chest x-ray showed no acute  disease.  



CT of the head no acute intracranial pathology.

 

ASSESSMENT AND PLAN:  

1.  Fall, possible orthostasis

Likely secondary to ARF on CRI, overdiuresis. 

Question of symptomatic bradycardia.



2. hyperkalemia secondary to decreased kidney function, potassium supplements, 
spironolactone.  Daily banana intake.  

3. Hypertension, currently normotensive

4. Chronic diastolic heart failure, patient on the dry side.  

5. Coronary artery disease status post stenting. 

6. DM2, insulin requiring.  Well controlled as of recent outpatient hemoglobin 
A1c of 6.7 last 2017.  

 

PLAN:  

PCU

Check orthostatic vitals

IVF, hold home diuretics, potassium supplements for now

Calcium gluconate  for hyperkalemia given junctional rhythm on EKG on 
admission.  

IV insulin to effect cellular redistribution of potassium.

Follow serum potassium 

Nephrology consult, ARF on CRI, hyperkalemia.  (Patient known to Dr. Moreno.)

Cardio consult RE worsening bradycardic episodes. Patient known to [facility]. 

Basal insulin, ISS, BG goal 140-180; patient due for hemoglobin A1c recheck.  

DVT prophylaxis. Heparin subcutaneous

DNR.

 

 

 

MTDD

## 2017-08-18 NOTE — CARDIOLOGY CONSULTATION
Cardiology Consultation


Date of Consultation:  Aug 18, 2017


History of Present Illness


Radha Mantilla is a 75 year old female seen in cardiology consultation per the 

request of Dr Washington for the evaluation of bradycardia.  The patient attended a 

routine visit with Dr. Marcos yesterday.  When she got up for the appointment 

she noted that her legs felt rubbery and weak.  She successfully made through 

the appointment however.  Per review of the progress note it was a routine 

visit and she only complained of generalized stiffness consistent with 

arthritis pains.  Her heart rate was documented to be 60 bpm at the time of the 

appointment per vital signs.  The patient went to a gas station for gas on her 

way home and she had a fall after getting out of her car and needed help 

getting up.  She then went home, and after her daughter checked up on her , the 

patient came to the emergency room for further evaluation however this is many 

hours after her call.  EKG performed last evening at 2039 hrs. revealed 

junctional rhythm at 48 bpm. her potassium was elevated on arrival at 6 mmol 

per liter.  She received calcium gluconate, dextrose, and IV insulin.  She was 

admitted to the telemetry floor and remained in a junctional rhythm throughout 

the evening for the most part in the 45-50 beat per minute range.  A repeat EKG 

performed this morning 2017 at 7:46 AM revealed intermittent sinus 

bradycardia with PACs as well as junctional escape rhythm on the same 10 second 

EKG strip.  After this EKG was performed patient has received another dose of 

calcium gluconate and has received Kayexalate.  Currently on telemetry sinus 

rhythm in the 60 beat per minute range is noted.  Her most recent potassium has 

trended down to 5.1








The patient had last been seen as an outpatient by Mark Lombardo PAC of our 

practice on 7/10/2017.  The note describes that has been ongoing issues with 

right-sided heart failure for which she has been prescribed metolazone and had 

been referred to lymphedema therapy with physical therapy.  She had been seen 

by her nephrologist, Dr. Moreno  and apparently per the report 

metolazone was discontinued due to hyperkalemia.  Furosemide was increased to 

80 mg twice a day and supplementary potassium dose was increased to 40 mEq 3 

times per day, a total of 120 mEq daily.





Past Medical/Surgical History


Problem List:


Medical Problems:


(1) Benign hypertension


(2) Chronic kidney disease stage 3


(3) Coronary artery disease


(4) Diabetes mellitus type 2


(5) Dyslipidemia


(6) Glaucoma


(7) Gout


(8) Morbid obesity


(9) Neuropathy





History


Past Medical History: 


ASCVD


NSTEMI in 2002.


Diffuse mild atherosclerotic coronary artery diease by 2002 

diagnostic cardiac catheterization by Dr. Kerwin Owens at [facility].


Single discrete high grade obstruction of the mid RCA status post PCI at 

[facility] with a 4.0 x 38 mm Penta bare metal stent.


Long 60% mid LAD stenosis extending into the origin of a large diagonal branch 

and narrowing it by 50%.


Preserved LV systolic function


Hypertension


Dyslipidemia with optimal LDL goal of < 70 mg/dL


Type II diabetes mellitus with neuropathy


Chronic kidney disease followed by Dr. Moreno


Obesity


History of gout


Chart history of anemia








Past Surgical History: 


Three prior C-sections


Spincterotomy by Dr. Brown in 2007


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 Ortega.


Elective right knee surgery in 2012. Post knee surgery she was 

hospitalized [place] [facility] [place] [facility] [place] to symptomatic bradyarrhythmias in 

association with acute kidney injury and hyperkalemia that required dopamine 

with arrhythmias primarily responding to treatment of the hyperkalemia. There 

was concern for PE though suspicion is low at [facility] secondary to negative 

peripheral duplexes, no evidence of right heart strain on echo, and negative 

troponin. She was also treated with broad spectrum antibiotics to cover for 

possible sepsis. 





Family History: Mother  with an MI at 68. Father  with a CVA at 69. One 

brother  from complications of diabetes. She has three sisters who are 

alive and well. 


Social History: Nonsmoker. No alcohol. No illegal drugs. . Three 

daughters. 








Review Of Systems


See above for pertinent positives & negatives. A total of 10 systems reviewed 

and were otherwise negative.





Allergies


Coded Allergies:  


     Sulfamethoxazole w/Trimethoprim (Verified  Allergy, Unknown, RASH, 17)





Medications





Reported Home Medications








 Medications  Dose


 Route/Sig


 Max Daily Dose Days Date Category Dose


Instructions


 


 Novolog Penfill


  (Insulin Aspart)


 100 Unit/Ml Inj  18 Units


 SQ SUPPER


    17 Reported 


 


 Nystatin Cream


  (Nystatin) 90


 Appln/30 Gm Cr  1 Appln


 TOP BID PRN


    17 Reported 


 


 Lorazepam 2 Mg Tab  2 Mg


 PO HS


    17 Reported 


 


 Percocet


 5MG/325MG


  (Oxycodone/Acetaminophen)


 Tab  1 Tablet


 PO Q6H PRN


    17 Reported  PAIN


 


 Neurontin


  (Gabapentin) 300


 Mg Cap  900 Mg


 PO HS


    17 Reported 


 


 Lantus Solostar


  (Insulin


 Glargine) 100


 Unit/Ml Inj  60


 SC QPM


    17 Reported 


 


 Lantus Solostar


  (Insulin


 Glargine) 100


 Unit/Ml Inj  50 Units


 SQ QAM


    17 Reported 


 


 Novolog Penfill


  (Insulin Aspart)


 100 Unit/Ml Inj  16 Units


 SQ LUNCH


    17 Reported 


 


 Novolog Penfill


  (Insulin Aspart)


 100 Unit/Ml Inj  14 Units


 SQ QA


    17 Reported 


 


 Lasix


  (Furosemide) 40


 Mg Tab  80 Mg


 PO QAM


    12/14/15 Reported 


 


 Zaroxolyn


  (Metolazone) 5 Mg


 Tab  5 Mg


 PO DAILY PRN


    12/14/15 Reported 


 


 K-Tab (Potassium


 Chloride) 20 Meq


 Tab  3 Tabs


 PO BID


    12/14/15 Reported 


 


 Lipitor


  (Atorvastatin


 Calcium) 80 Mg Tab  80 Mg


 PO QAM


    12/14/15 Reported 


 


 Toprol Xl


  (Metoprolol


 Succinate) 25 Mg


 Tab  12.5 Mg


 PO QAM


    13 Reported 


 


 Zyloprim


  (Allopurinol) 300


 Mg Tab  300 Mg


 PO QAM


    12 Reported 


 


 Ecotrin Or


 Generic (Aspirin)


 81 Mg Tab  81 Mg


 PO QAM


    12 Reported 


 


 Lasix


  (Furosemide) 40


 Mg Tab  40 Mg


 PO HS


    12 Reported 


 


 Neurontin


  (Gabapentin) 300


 Mg Cap  600 Mg


 PO QAM


    12 Reported 


 


 Nitrostat


  (Nitroglycerin)


 0.4 Mg Tab  0.4 Mg


 UT PRN


    12 Reported 











Physical Exam


Vital Signs (Last 8hrs):





Last 8 Hrs








  Date Time  Temp Pulse Resp B/P (MAP) Pulse Ox O2 Delivery O2 Flow Rate FiO2


 


17 08:02 36.4 46 18 135/64 (87) 97   


 


17 04:30 36.9 36 18 118/76 (90) 98   





  43  150/77 (101)    





  53  153/80 (104)    








General Appearance: Alert and Oriented x3. NAD. 


Head: Normocephalic Atraumatic. 


Eyes: PERRLA, EOMI, conjunctiva and sclera clear


Neck:  Supple. No carotid bruits noted. No JVD. No HJD. 


Respiratory: Breath sounds clear to auscultation bilaterally. No w/r/r. 


Cardiovascular: Reg rate and rhythm. S1 and S2 noted. No murmurs, rubs, 

gallops. PMI non displace. 


Abdomen: Normal bowel sounds, soft nontender. no abdominal bruits. 


Extremities:  trace edema 


Neuro:  No focal deficits. 


Psychiatric: Normal affect.





Data





17 03:15








Red Blood Count 4.24, Mean Corpuscular Volume 92.7, Mean Corpuscular Hemoglobin 

30.7, Mean Corpuscular Hemoglobin Concent 33.1, Mean Platelet Volume 12.0, 

Neutrophils (%) (Auto) 64.4, Lymphocytes (%) (Auto) 22.6, Monocytes (%) (Auto) 

10.6, Eosinophils (%) (Auto) 1.8, Basophils (%) (Auto) 0.3, Neutrophils # (Auto

) 4.96, Lymphocytes # (Auto) 1.74, Monocytes # (Auto) 0.82, Eosinophils # (Auto

) 0.14, Basophils # (Auto) 0.02





Last Resulted


17 08:18











Past 24 Hours








Test


  17


20:21 17


22:02 17


03:15 17


08:18 Range/Units


 


 


Total Creatine Kinase   690 H 731 H    U/L


 


Troponin I 0.046 *H  0.059 *H 0.049 *H 0-0.045  ng/ml


 


Prothromb Time International


Ratio 


  1.0 


  


  


  0.9-1.1  


 


 


Prothrombin Time


  


  10.6 


  


  


  9.0-12.0


SECONDS











EKG:as per HPI





Assessment & Plan


Summary of transthoracic echocardiogram performed on 17 at [facility]:


The LV wall thickness is moderately increased (concentric).


The basal septum is thickened and angulated consistent with sigmoid septum.


The qualitative LV ejection fraction is 5559%


(normal).


The right ventricular systolic function is normal as assessed by tricuspid 

annular plane systolic excursion (TAPSE)


(normal >1.7 cm).


The left atrium is moderately enlarged.


The right atrial size is normal.


There is severe mitral annular calcification.


Mild mitral regurgitation is present.


Mild aortic valve sclerosis is present.


Mild aortic valve regurgitation is present.





Impression:


1.  Transient junctional bradycardia in the setting of hyperkalemia, improved 

at present


2.  Hypoxia history of volume overload, right heart failure, on high-dose 

diuretic and high dose potassium replacement


3.  Acute kidney injury on chronic kidney disease creatinine performed on  had been 1.7 and the creatinine is 2.2 mg/dL today.





Plan:


Recommend holding metoprolol.  Hospitalist service and nephrology input 

appreciated regarding management of hyperkalemia.


She had a transthoracic echocardiogram recently in our office on 17 with 

results as above and therefore do not think we need to repeat this at present.


I think her mild troponin elevation as well as CPK elevation is likely due to 

her acute kidney injury and fall, and I do not think this represents an acute 

coronary syndrome or decompensated congestive heart failure.





Looking ahead, will need  to be cautious in determining her ongoing diuretic 

dose and ongoing potassium supplementation dose.








Patient will be reassessed as her hospital stay develops regarding timing of 

reinitiating metoprolol.

## 2017-08-19 NOTE — CARDIOLOGY FOLLOW-UP
Subjective


General


Date of Service:


Aug 19, 2017.


Pt evaluation today including:  conversation w/ patient, conversation w/ family

, physical exam, chart review, lab review, review of studies, review of 

inpatient medication list





History of Present Illness


The patient is a 75 year old female seen in follow-up.


Patient feeling well from a cardiovascular standpoint.


Received a dose of Bumex this morning.


Edema unchanged.


Denies chest pain or unusual shortness of breath.


Remains in sinus rhythm on telemetry.


Toprol-XL remains on hold.





Allergies


Coded Allergies:  


     Sulfamethoxazole w/Trimethoprim (Verified  Allergy, Unknown, RASH, 8/17/17)





Social History


Smoking Status:  Never Smoker


Hx Tobacco Use In Past Year?:  No


Hx Alcohol Use - Type And Amou:  No


Hx Substance Use - Type And Am:  No





Problem List


Medical Problems:


(1) Acute renal failure


Status: Acute  





(2) Bradycardia


Status: Acute  





(3) Hyperkalemia


Status: Acute  





(4) Weakness


Status: Acute  











Review of Systems


Respiratory:  No cough, No sputum, No wheezing, No shortness of breath, No 

dyspnea on exertion, No dyspnea at rest, No hemoptysis


Cardiac:  + edema, No chest pain, No orthopnea, No PND, No claudication, No 

palpitations





Physical Exam


Vital Signs





Last Vital Signs Documentation








  Date Time  Temp Pulse Resp B/P (MAP) Pulse Ox O2 Delivery O2 Flow Rate FiO2


 


8/19/17 12:44     98 Room Air  


 


8/19/17 11:59 36.5 65 18 167/79 (108)    











Physical Exam


Constitutional:  


   General Apperance:  obese


   Level of Distress:  NAD, chronically ill


Head:  normocephalic


ENMT:  normal ENT inspection


Neck:  supple, trachea midline


Lungs:  


   Auscultation:  breath sounds normal, no wheezing, no rales/crackles, no 

rhonchi


Cardiovascular:  


   Heart Auscultation:  RRR, normal S1, normal S2, no murmurs


Abdomen:  


   Inspection & Palpation:  soft, non-distended, no tenderness, guarding & 

rebound


Extremities:  no cyanosis, no clubbing, no ulcers, edema (1+ B/L pedal, +B/L 

TEDs)


Neurologic:  


   Gait & Station:  pertinent finding (No focal motor deficit)


   Cranial Nerves:  grossly intact





Assessment and Plan


Assessment and Plan


Impression:


1.  Transient junctional bradycardia in the setting of hyperkalemia


     -Resolved; serum potassium within normal limits


     -Low-dose Toprol remains on hold


2.  History of volume overload and right heart failure treated with high-dose 

diuretic and high dose potassium replacement.


     -Repeat echocardiogram performed during hospitalization demonstrates 

normal right ventricular size and function


     -Lasix transition to Bumex per nephrology


3.  Acute kidney injury on chronic kidney disease - resolved; creatinine tach 

to baseline





Plan / recommendations:


Hold low dose Toprol-XL.


Continue diuretic therapy per direction of nephrology.


Repeat basic metabolic panel in the a.m.


Continue telemetry monitoring.


No further cardiac testing at this time.





Laboratory Results





Last 24 Hours








Test


  8/18/17


16:06 8/18/17


16:22 8/18/17


20:16 8/18/17


20:26


 


Potassium Level 4.8 mmol/L    4.7 mmol/L 


 


Bedside Glucose  227 mg/dl  235 mg/dl  


 


Sodium Level    136 mmol/L 


 


Chloride Level    103 mmol/L 


 


Carbon Dioxide Level    27 mmol/L 


 


Anion Gap    6.0 mmol/L 


 


Blood Urea Nitrogen    75 mg/dl 


 


Creatinine    2.20 mg/dl 


 


Est Creatinine Clear Calc


Drug Dose 


  


  


  25.5 ml/min 


 


 


Estimated GFR (


American) 


  


  


  24.6 


 


 


Estimated GFR (Non-


American 


  


  


  21.2 


 


 


BUN/Creatinine Ratio    34.0 


 


Random Glucose    245 mg/dl 


 


Calcium Level    9.9 mg/dl 


 


Test


  8/19/17


06:04 8/19/17


06:58 8/19/17


11:14 


 


 


White Blood Count 5.19 K/uL    


 


Red Blood Count 4.26 M/uL    


 


Hemoglobin 13.3 g/dL    


 


Hematocrit 39.2 %    


 


Mean Corpuscular Volume 92.0 fL    


 


Mean Corpuscular Hemoglobin 31.2 pg    


 


Mean Corpuscular Hemoglobin


Concent 33.9 g/dl 


  


  


  


 


 


Platelet Count 128 K/uL    


 


Mean Platelet Volume 11.9 fL    


 


Neutrophils (%) (Auto) 63.6 %    


 


Lymphocytes (%) (Auto) 24.1 %    


 


Monocytes (%) (Auto) 7.9 %    


 


Eosinophils (%) (Auto) 4.0 %    


 


Basophils (%) (Auto) 0.4 %    


 


Neutrophils # (Auto) 3.30 K/uL    


 


Lymphocytes # (Auto) 1.25 K/uL    


 


Monocytes # (Auto) 0.41 K/uL    


 


Eosinophils # (Auto) 0.21 K/uL    


 


Basophils # (Auto) 0.02 K/uL    


 


RDW Standard Deviation 49.1 fL    


 


RDW Coefficient of Variation 14.6 %    


 


Immature Granulocyte % (Auto) 0.0 %    


 


Immature Granulocyte # (Auto) 0.00 K/uL    


 


Sodium Level 139 mmol/L    


 


Potassium Level 4.4 mmol/L    


 


Chloride Level 107 mmol/L    


 


Carbon Dioxide Level 26 mmol/L    


 


Anion Gap 6.0 mmol/L    


 


Blood Urea Nitrogen 65 mg/dl    


 


Creatinine 1.70 mg/dl    


 


Est Creatinine Clear Calc


Drug Dose 32.9 ml/min 


  


  


  


 


 


Estimated GFR (


American) 33.6 


  


  


  


 


 


Estimated GFR (Non-


American 29.0 


  


  


  


 


 


BUN/Creatinine Ratio 38.1    


 


Random Glucose 136 mg/dl    


 


Calcium Level 9.5 mg/dl    


 


Bedside Glucose  129 mg/dl  185 mg/dl

## 2017-08-19 NOTE — NEPHROLOGY PROGRESS NOTE
Nephrology Progress Note


Date of Service


Aug 19, 2017.





Chief Complaint


Follow up evaluation of acute on chronic kidney injury, hyperkalemia and 

peripheral edema





Subjective


Ms. Mantilla was seen & examined in the ICU this morning.  Her daughter was 

present at bedside.  Ms. Mantilla currently denies angina, palpitations or 

dyspnea.  Her primary concern is that she has again started to retain fluid in 

her legs.





Review of Systems


Constitutional:  No fever


Cardiovascular:  No chest pain


Respiratory:  No dyspnea at rest


Abdomen:  No pain, No nausea, No vomiting


Extremities:  + leg edema


A complete review of systems was performed.  Pertinent positives are noted 

above. All other systems are negative.





Vital Signs





Last 8 Hrs








  Date Time  Temp Pulse Resp B/P (MAP) Pulse Ox O2 Delivery O2 Flow Rate FiO2


 


8/19/17 08:15 36.4 62 19 137/59 (85) 97 Room Air  


 


8/19/17 04:46 36.4 65 22 123/63 (83) 98 Room Air  


 


8/19/17 04:00      Room Air  











Last Recorded Weight


Weight (Kilograms):  106.900





Physical Exam


General Appearance:  no apparent distress


Head:  normocephalic, atraumatic


Eyes:  PERRL


Neck:  no adenopathy


Respiratory/Chest:  lungs clear


Cardiovascular:  regular rate, rhythm


Abdomen/GI:  normal bowel sounds, non tender, soft


Extremities/Musculoskelatal:  + pertinent finding (trace pretibial pitting edema

)


Neurologic/Psych:  alert, oriented x 3





Social History


Smoking Status:  Never smoker


Drug Use:  none


Marital Status:  


Housing Status:  lives alone


Occupation:  retired





Laboratory Results


Past 24 Hours


8/19/17 06:04








Red Blood Count 4.26, Mean Corpuscular Volume 92.0, Mean Corpuscular Hemoglobin 

31.2, Mean Corpuscular Hemoglobin Concent 33.9, Mean Platelet Volume 11.9, 

Neutrophils (%) (Auto) 63.6, Lymphocytes (%) (Auto) 24.1, Monocytes (%) (Auto) 

7.9, Eosinophils (%) (Auto) 4.0, Basophils (%) (Auto) 0.4, Neutrophils # (Auto) 

3.30, Lymphocytes # (Auto) 1.25, Monocytes # (Auto) 0.41, Eosinophils # (Auto) 

0.21, Basophils # (Auto) 0.02





8/18/17 16:06








8/18/17 20:26








8/19/17 06:04

















Test


  8/18/17


11:29 8/18/17


16:22 8/18/17


20:16 8/18/17


20:26


 


Bedside Glucose


  197 mg/dl


(70-90) 227 mg/dl


(70-90) 235 mg/dl


(70-90) 


 


 


Anion Gap


  


  


  


  6.0 mmol/L


(3-11)


 


Est Creatinine Clear Calc


Drug Dose 


  


  


  25.5 ml/min 


 


 


Estimated GFR (


American) 


  


  


  24.6 


 


 


Estimated GFR (Non-


American 


  


  


  21.2 


 


 


BUN/Creatinine Ratio    34.0 (10-20) 


 


Calcium Level


  


  


  


  9.9 mg/dl


(8.5-10.1)


 


Test


  8/19/17


06:04 8/19/17


06:58 


  


 


 


White Blood Count


  5.19 K/uL


(4.8-10.8) 


  


  


 


 


Red Blood Count


  4.26 M/uL


(4.2-5.4) 


  


  


 


 


Hemoglobin


  13.3 g/dL


(12.0-16.0) 


  


  


 


 


Hematocrit 39.2 % (37-47)    


 


Mean Corpuscular Volume


  92.0 fL


() 


  


  


 


 


Mean Corpuscular Hemoglobin


  31.2 pg


(25-34) 


  


  


 


 


Mean Corpuscular Hemoglobin


Concent 33.9 g/dl


(32-36) 


  


  


 


 


Platelet Count


  128 K/uL


(130-400) 


  


  


 


 


Mean Platelet Volume


  11.9 fL


(7.4-10.4) 


  


  


 


 


Neutrophils (%) (Auto) 63.6 %    


 


Lymphocytes (%) (Auto) 24.1 %    


 


Monocytes (%) (Auto) 7.9 %    


 


Eosinophils (%) (Auto) 4.0 %    


 


Basophils (%) (Auto) 0.4 %    


 


Neutrophils # (Auto)


  3.30 K/uL


(1.4-6.5) 


  


  


 


 


Lymphocytes # (Auto)


  1.25 K/uL


(1.2-3.4) 


  


  


 


 


Monocytes # (Auto)


  0.41 K/uL


(0.11-0.59) 


  


  


 


 


Eosinophils # (Auto)


  0.21 K/uL


(0-0.5) 


  


  


 


 


Basophils # (Auto)


  0.02 K/uL


(0-0.2) 


  


  


 


 


RDW Standard Deviation


  49.1 fL


(36.4-46.3) 


  


  


 


 


RDW Coefficient of Variation


  14.6 %


(11.5-14.5) 


  


  


 


 


Immature Granulocyte % (Auto) 0.0 %    


 


Immature Granulocyte # (Auto)


  0.00 K/uL


(0.00-0.02) 


  


  


 


 


Anion Gap


  6.0 mmol/L


(3-11) 


  


  


 


 


Est Creatinine Clear Calc


Drug Dose 32.9 ml/min 


  


  


  


 


 


Estimated GFR (


American) 33.6 


  


  


  


 


 


Estimated GFR (Non-


American 29.0 


  


  


  


 


 


BUN/Creatinine Ratio 38.1 (10-20)    


 


Calcium Level


  9.5 mg/dl


(8.5-10.1) 


  


  


 


 


Bedside Glucose


  


  129 mg/dl


(70-90) 


  


 











Allergies


Coded Allergies:  


     Sulfamethoxazole w/Trimethoprim (Verified  Allergy, Unknown, RASH, 8/17/17)





Medications





Current Inpatient Medications








 Medications


  (Trade)  Dose


 Ordered  Sig/Will


 Route  Start Time


 Stop Time Status Last Admin


Dose Admin


 


 Heparin Sodium


  (Porcine)


  (Heparin Sq 5000


 Unit/0.5ml)  5,000 unit  Q8


 SQ  8/18/17 06:00


 9/17/17 05:59  8/18/17 21:32


5,000 UNIT


 


 Acetaminophen


  (Tylenol Tab)  650 mg  Q4H  PRN


 PO  8/18/17 01:30


 9/17/17 01:29   


 


 


 Nitroglycerin


  (Nitrostat Tab)  0.4 mg  UD  PRN


 SL  8/18/17 01:30


 9/17/17 01:29   


 


 


 Insulin Aspart


  (novoLOG ASPART)  **SLIDING


 SCALE**


 **If C...  ACHS


 SC  8/18/17 07:00


 9/17/17 06:59  8/18/17 21:32


3 UNITS


 


 Glucose


  (Glucose 40% Gel)  15-30


 GRAMS 15


 GRAMS...  UD  PRN


 PO  8/18/17 01:30


 9/17/17 01:29   


 


 


 Glucose


  (Glucose Chew


 Tab)  4-8


 Tablets 4


 Tabl...  UD  PRN


 PO  8/18/17 01:30


 9/17/17 01:29   


 


 


 Dextrose


  (Dextrose 50%


 50ML Syringe)  25-50ML OF


 50% DW IV


 FOR...  UD  PRN


 IV  8/18/17 01:30


 9/17/17 01:29   


 


 


 Glucagon


  (Glucagon Inj)  1 mg  UD  PRN


 SQ  8/18/17 01:30


 9/17/17 01:29   


 


 


 Hydromorphone HCl


  (Dilaudid Inj)  0.5 mg  Q3H  PRN


 IV  8/18/17 01:30


 9/1/17 01:29   


 


 


 Allopurinol


  (Zyloprim Tab)  300 mg  QAM


 PO  8/18/17 09:00


 9/17/17 08:59  8/19/17 08:50


300 MG


 


 Aspirin


  (Ecotrin Tab)  81 mg  QAM


 PO  8/18/17 09:00


 9/17/17 08:59  8/19/17 08:50


81 MG


 


 Gabapentin


  (Neurontin Cap)  200 mg  BID


 PO  8/18/17 09:00


 9/17/17 08:59  8/19/17 08:50


200 MG


 


 Lorazepam


  (Ativan Tab)  0.5 mg  HS  PRN


 PO  8/18/17 01:30


 9/17/17 01:29  8/18/17 21:34


0.5 MG


 


 Oxycodone/


 Acetaminophen


  (Percocet


 5-325mg Tab)  1 tab  Q6H  PRN


 PO  8/18/17 01:30


 9/1/17 01:29   


 


 


 Insulin Glargine


  (Lantus Solostar


 Pen)  5 units  BID


 SC  8/18/17 09:00


 9/17/17 08:59  8/19/17 08:54


5 UNITS


 


 Miconazole Nitrate


  (Desenex Powder)  1 appln  PRN  PRN


 EXT  8/18/17 20:15


 9/17/17 20:14   


 


 


 Bumetanide


  (Bumex Tab)  1 mg  BID17


 PO  8/19/17 17:00


 9/18/17 16:59   


 











Impression





(1) Acute renal insufficiency


(2) Chronic kidney disease, stage IV (severe)


(3) Hyperkalemia


(4) Lymphedema


(5) Coronary artery disease


(6) Junctional bradycardia


Radha Mantilla is a 75-year-old female with obesity, diabetes mellitus, coronary 

artery disease and CKD IV.  She has chronic lower extremity lymphedema 

requiring large doses of diuretics to maintain euvolemia.  She also has a 

history of loop diuretic associated hypokalemia.  She was admitted with 

hyperkalemia, DAVEY and junctional bradycardia.  Baseline heart rate by review of 

clinic records has been 60-70 bpm.  Radha is clinically improving with medical 

management.





Recommendations


DAVEY:


-- Resolved.  Creatinine is back to baseline 1.7 - 2.1


-- Electrolyte balance is acceptable at this time.  Continue to monitor PRP on 

daily basis





HYPERKALEMIA:


-- Corrected.  Hold potassium supplements





EDEMA:


-- Limit oral NaCl intake to 1500 mg / day


-- Start Bumex 1 mg po BID 


-- Will prescribe medium strength knee high compression hose to be worn while 

awake and removed at bedtime

## 2017-08-19 NOTE — PROGRESS NOTE
Internal Med Progress Note


Date of Service:


Aug 19, 2017.


Provider Documentation:





sitting on the chair comfortably


afebrile


denies chest pain or sob


no nausea


likes to go home.





Exam:


General-alert and awake. Not in distress   


ENT-normal hearing


Neck-no neck masses


Lungs-cta b/l no wheezing or crackles


Heart-s1 and s2 heard regular rhythm no murmurs


Abdomen-soft bowel sounds present non tender no distension 


Extremities-b/l lower extremity edema present no erythema 


Neuro-alert and awake


moves extremities


ASSESSMENT & PLAN:


1.  Fall, Likely secondary to ARF on CRI, overdiuresis. 


Question of symptomatic bradycardia.


pt/ot


currently stable.





2. hyperkalemia 


  secondary t arf and potassium supplements


received insulin, dextrose, calcium gluconate and Kayexalate


improved.





Junctional rhythm


with bradycardia


mostly from hyperkalemia' 


holding toprol xl


appreciate cardiology inputs





ARF on CKD stage 3


baseline cr 1.7 - 2.1


presented with cr 2.8


Lasix held initially


cr 1.7 at baseline today


started on Bumex by nephrology


will f/u labs





Chronic Lower extremity lymphedema


from chronic right heart failure?  with preserved EF repeat echo no failure


was requiring high doses of diuretics to keep euvolemic and loop diuretics were 

causing hypokalemia so wa taking potassium supplemenst as per nephrology


currently initiated on Bumex.





 Hypertension, 


Toprol xl held


on Bumex


will monitor.





 Coronary artery disease status post stenting. On aspirin,B blocker on hold.





 DM2, insulin requiring.  Well controlled as of recent outpatient hemoglobin 

A1c of 6.7 last April 2017


On Lantus and ISS





DVT PROPHYLAXIS


hep sub q





DISPOSITION


monitor in tele


to be determined


Vital Signs:











  Date Time  Temp Pulse Resp B/P (MAP) Pulse Ox O2 Delivery O2 Flow Rate FiO2


 


8/19/17 16:02 36.5 67 18 169/77 (107) 98 Room Air  


 


8/19/17 16:01     98 Room Air  


 


8/19/17 12:44     98 Room Air  


 


8/19/17 11:59 36.5 65 18 167/79 (108) 97 Room Air  


 


8/19/17 08:15 36.4 62 19 137/59 (85) 97 Room Air  


 


8/19/17 08:01     98 Room Air  


 


8/19/17 04:46 36.4 65 22 123/63 (83) 98 Room Air  


 


8/19/17 04:00      Room Air  


 


8/19/17 00:07 36.8 61 20 129/65 (86) 94 Room Air  


 


8/19/17 00:00      Room Air  


 


8/18/17 20:00      Room Air  


 


8/18/17 19:38 36.4 60 22 122/66 (84) 98 Room Air  








Lab Results:





Results Past 24 Hours








Test


  8/18/17


20:16 8/18/17


20:26 8/19/17


06:04 8/19/17


06:58 Range/Units


 


 


Bedside Glucose 235   129 70-90  mg/dl


 


Sodium Level  136 139  136-145  mmol/L


 


Potassium Level  4.7 4.4  3.5-5.1  mmol/L


 


Chloride Level  103 107    mmol/L


 


Carbon Dioxide Level  27 26  21-32  mmol/L


 


Anion Gap  6.0 6.0  3-11  mmol/L


 


Blood Urea Nitrogen  75 65  7-18  mg/dl


 


Creatinine


  


  2.20


  1.70


  


  0.60-1.20


mg/dl


 


Est Creatinine Clear Calc


Drug Dose 


  25.5


  32.9


  


   ml/min


 


 


Estimated GFR (


American) 


  24.6


  33.6


  


   


 


 


Estimated GFR (Non-


American 


  21.2


  29.0


  


   


 


 


BUN/Creatinine Ratio  34.0 38.1  10-20  


 


Random Glucose  245 136  70-99  mg/dl


 


Calcium Level  9.9 9.5  8.5-10.1  mg/dl


 


White Blood Count   5.19  4.8-10.8  K/uL


 


Red Blood Count   4.26  4.2-5.4  M/uL


 


Hemoglobin   13.3  12.0-16.0  g/dL


 


Hematocrit   39.2  37-47  %


 


Mean Corpuscular Volume   92.0    fL


 


Mean Corpuscular Hemoglobin   31.2  25-34  pg


 


Mean Corpuscular Hemoglobin


Concent 


  


  33.9


  


  32-36  g/dl


 


 


Platelet Count   128  130-400  K/uL


 


Mean Platelet Volume   11.9  7.4-10.4  fL


 


Neutrophils (%) (Auto)   63.6   %


 


Lymphocytes (%) (Auto)   24.1   %


 


Monocytes (%) (Auto)   7.9   %


 


Eosinophils (%) (Auto)   4.0   %


 


Basophils (%) (Auto)   0.4   %


 


Neutrophils # (Auto)   3.30  1.4-6.5  K/uL


 


Lymphocytes # (Auto)   1.25  1.2-3.4  K/uL


 


Monocytes # (Auto)   0.41  0.11-0.59  K/uL


 


Eosinophils # (Auto)   0.21  0-0.5  K/uL


 


Basophils # (Auto)   0.02  0-0.2  K/uL


 


RDW Standard Deviation   49.1  36.4-46.3  fL


 


RDW Coefficient of Variation   14.6  11.5-14.5  %


 


Immature Granulocyte % (Auto)   0.0   %


 


Immature Granulocyte # (Auto)   0.00  0.00-0.02  K/uL


 


Test


  8/19/17


11:14 8/19/17


15:57 


  


  Range/Units


 


 


Bedside Glucose 185 226   70-90  mg/dl

## 2017-08-20 NOTE — DISCHARGE INSTRUCTIONS
Discharge Instructions


Date of Service


Aug 20, 2017.





Admission


Reason for Admission:  Hyperkalemia





Discharge


Discharge Diagnosis / Problem:  HYPPERKAEMIA, ARF,JUNCTIONAL BRADYCARDIA





Discharge Goals


Goal(s):  Decrease discomfort, Improve function





Activity Recommendations


Activity Limitations:  resume your previous activity





.





Instructions / Follow-Up


Instructions / Follow-Up


FOLLOWUP WITH FAMILY DOCTOR Dedra Fuentes ON AUGUST 24TH AT 11AM.





FOLLOWUP WITH CARDIOLOGY AS SCHEDULED. RESTART OF TOPROL XL(METOPROLOL SUCCINATE

) AS PER CARDIOLOGY.





FOLLOWUP WITH NEPHROLOGY  AS SCHEDULED IN 1-2 WEEKS WITH LAB WORK. 

LAB WORK TO BE ORDERED BY NEPHROLOGY.





TO CHECK BLOOD SUGARS DAILY AS RECOMMENDED AND FOLLOW RESULTS WITH FAMILY 

DOCTOR. 





NEW MEDICATION:


BUMEX 1MG PO TWICE DAILY.





MEDICATIONS STOPPED:


LASIX


TOPROL XL


METOLAZONE


POTASSIUM SUPPLEMENTS





FLUID RESTRICTIONS: 1500ML/DAY








Call your Primary Care doctor if any of the following symptoms or problems 

start or get worse:





* Shortness of breath or difficulty breathing


* Wake up at night short of breath


* Chest pain


* Cough


* Swelling of your hands, feet, or legs


* More fatigued or tired with your normal activity


* Palpitations - sudden fast heart beats





WEIGHT





* Weigh yourself every morning after using the bathroom.


* Use the same scale.


* Wear the same amount of clothing.


* Write your weight down on a chart.


* Call your Primary Care doctor if you gain more than 2-3 pounds in 1-2 days.





MEDICATIONS





* Use this discharge instruction sheet for medication instructions.


* Take your medications at the time your doctor ordered.


* Do not skip a dose of your medicines.


* If you miss a dose of medicine, take it as soon as possible, but DO NOT 

DOUBLE A DOSE.


* Read your medicine information when you get home.


* Know all of the side effects of your medicine.  If in doubt, ask your 

pharmacist


* Call your Primary Care doctor's office if you have any side effects.


* Be sure all of your doctors know what medicine and herbs you take (including 

cold, flu, and herbal medicine).








Take the following with you to your follow-up doctor appointments:





* Weight Chart


* Medication List


* List of questions





Do not drink excessive alcohol, beer or wine.





Current Hospital Diet


Patient's current hospital diet: Diabetes Type 2 Diet, Low Potassium Diet (2g K)





Discharge Diet


Recommended Diet:  Diabetes Type 2 Diet, Low Potassium Diet (2g K)


Fluid Restriction:  1500 ml (6 cups)





Pending Studies


Studies pending at discharge:  no





Medical Emergencies








.


Who to Call and When:





Call 911 or go to the Emergency Room if:





* If at any time you feel your situation is an emergency


* You have tightness or pain in your chest that does not go away with rest or 

Nitroglycerin


* You are very short of breath even with rest





.





Non-Emergent Contact


Non-Emergency issues call your:  Primary Care Provider





.


.








"Provider Documentation" section prepared by Tyrone Mathis.








.





VTE Core Measure


Inpt VTE Proph given/why not?:  Unfractionated heparin SQ

## 2017-08-20 NOTE — CARDIOLOGY FOLLOW-UP
Subjective


General


Date of Service:


Aug 20, 2017.


Pt evaluation today including:  conversation w/ patient, physical exam, chart 

review, lab review, review of studies, review of inpatient medication list





History of Present Illness


The patient is a 75 year old female seen in follow-up.


Feeling well from a cardiovascular standpoint.


No recurrent bradycardia on monitor.


Edema improved with Bumex.


Serum potassium and renal function remained stable.


Offers no complaints this time.


Low-dose Toprol-XL remains on hold.





Allergies


Coded Allergies:  


     Sulfamethoxazole w/Trimethoprim (Verified  Allergy, Unknown, RASH, 8/17/17)





Social History


Smoking Status:  Never Smoker


Hx Tobacco Use In Past Year?:  No


Hx Alcohol Use - Type And Amou:  No


Hx Substance Use - Type And Am:  No





Problem List


Medical Problems:


(1) Acute renal failure


Status: Acute  





(2) Bradycardia


Status: Acute  





(3) Hyperkalemia


Status: Acute  





(4) Weakness


Status: Acute  











Review of Systems


Respiratory:  No cough, No sputum, No wheezing, No shortness of breath, No 

dyspnea at rest, No hemoptysis


Cardiac:  No chest pain, No orthopnea, No PND, No edema, No palpitations





Physical Exam


Vital Signs





Last Vital Signs Documentation








  Date Time  Temp Pulse Resp B/P (MAP) Pulse Ox O2 Delivery O2 Flow Rate FiO2


 


8/20/17 08:01     98 Room Air  


 


8/20/17 07:17 36.7 65 19 133/83 (100)    











Physical Exam


Constitutional:  


   General Apperance:  obese


   Level of Distress:  NAD, chronically ill


Head:  normocephalic


ENMT:  normal ENT inspection


Neck:  supple, trachea midline


Lungs:  


   Auscultation:  breath sounds normal, no wheezing, no rales/crackles, no 

rhonchi


Cardiovascular:  


   Heart Auscultation:  RRR, normal S1, normal S2, no murmurs


Abdomen:  


   Inspection & Palpation:  soft, non-distended, no tenderness, guarding & 

rebound


Extremities:  no cyanosis, no clubbing, no ulcers, edema (1+ B/L pedal, +B/L 

TEDs)


Neurologic:  


   Gait & Station:  pertinent finding (No focal motor deficit)


   Cranial Nerves:  grossly intact





Assessment and Plan


Assessment and Plan


Impression:


1.  Transient junctional bradycardia in the setting of hyperkalemia


     -Resolved; serum potassium within normal limits


     -Low-dose Toprol remains on hold


2.  History of volume overload and right heart failure treated with high-dose 

diuretic and high dose potassium replacement.


     -patient appears compensated


     -Lasix transition to Bumex per nephrology


3.  Acute kidney injury on chronic kidney disease - resolved; creatinine at 

baseline





Plan / recommendations:


Hold low dose Toprol-XL. Consider restart as outpatient.


Continue diuretic therapy per direction of nephrology.


Repeat basic metabolic panel in the a.m.


Continue telemetry monitoring as inpatient.


No further cardiac testing at this time.


Will sign off.  Recommend outpatient cardiology follow up in 1-2 weeks.





Laboratory Results





Last 24 Hours








Test


  8/19/17


11:14 8/19/17


15:57 8/19/17


20:35 8/20/17


06:10


 


Bedside Glucose 185 mg/dl  226 mg/dl  223 mg/dl  


 


Sodium Level    136 mmol/L 


 


Potassium Level    4.2 mmol/L 


 


Chloride Level    103 mmol/L 


 


Carbon Dioxide Level    28 mmol/L 


 


Anion Gap    5.0 mmol/L 


 


Blood Urea Nitrogen    59 mg/dl 


 


Creatinine    1.80 mg/dl 


 


Est Creatinine Clear Calc


Drug Dose 


  


  


  31.0 ml/min 


 


 


Estimated GFR (


American) 


  


  


  31.4 


 


 


Estimated GFR (Non-


American 


  


  


  27.1 


 


 


BUN/Creatinine Ratio    32.7 


 


Random Glucose    233 mg/dl 


 


Calcium Level    9.6 mg/dl 


 


Test


  8/20/17


06:39 


  


  


 


 


Bedside Glucose 231 mg/dl

## 2017-08-20 NOTE — PROGRESS NOTE
Internal Med Progress Note


Date of Service:


Aug 20, 2017.


Provider Documentation:





sitting on the chair comfortably


slept ok


no sob


no nausea


eating ok


ok to go home.





Exam:


General-alert and awake. Not in distress   


ENT-normal hearing


Neck-no neck masses


Lungs-cta b/l no wheezing or crackles


Heart-s1 and s2 heard regular rhythm no murmurs


Abdomen-soft bowel sounds present non tender no distension 


Extremities-b/l lower extremity edema present no erythema 


Neuro-alert and awake


moves extremities


ASSESSMENT & PLAN:


1.  Fall, Likely secondary to ARF on CRI, overdiuresis. 


Question of symptomatic bradycardia.


pt/ot


currently stable.





2. hyperkalemia 


  secondary t arf and potassium supplements


received insulin, dextrose, calcium gluconate and Kayexalate


resolved


f/u labs with pcp and nephrology





Junctional rhythm


with bradycardia


mostly from hyperkalemia' 


holding toprol xl


appreciate cardiology inputs


cardiology to followup as out patient and reinitiating of Toprol xl as per 

cardiology.








ARF on CKD stage 3


baseline cr 1.7 - 2.1


presented with cr 2.8


Lasix held initially


cr 1.7 on 8/19/17


started on Bumex by nephrology


cr 1.8 on 8/20/17


will f/u labs with nephrology





Chronic Lower extremity lymphedema


from chronic right heart failure?  with preserved EF repeat echo no failure


was requiring high doses of diuretics to keep euvolemic and loop diuretics were 

causing hypokalemia so wa taking potassium supplemenst as per nephrology


currently initiated on Bumex.f/u with nephrology and cardiology





 Hypertension, 


Toprol xl held


on Bumex


will monitor.





 Coronary artery disease status post stenting. On aspirin,B blocker on hold as 

above





 DM2, insulin requiring.  Well controlled as of recent outpatient hemoglobin 

A1c of 6.7 last April 2017


On Lantus and ISS. d/c on home meds. followup with pcp





discharged home


Vital Signs:











  Date Time  Temp Pulse Resp B/P (MAP) Pulse Ox O2 Delivery O2 Flow Rate FiO2


 


8/20/17 14:01 37.2 73 18  98 Room Air  


 


8/20/17 12:58     98 Room Air  


 


8/20/17 12:21 37.2 73 18 171/74 (106) 94 Room Air  


 


8/20/17 08:01     98 Room Air  


 


8/20/17 07:17 36.7 65 19 133/83 (100) 97 Room Air  


 


8/20/17 04:00 36.5 72 18 138/72 (94) 94 Room Air  


 


8/20/17 04:00      Room Air  


 


8/20/17 00:02      Room Air  


 


8/20/17 00:00 36.5 70 18 146/78 (100) 93 Room Air  








Lab Results:





Results Past 24 Hours








Test


  8/19/17


20:35 8/20/17


06:10 8/20/17


06:39 8/20/17


11:05 Range/Units


 


 


Bedside Glucose 223  231 277 70-90  mg/dl


 


Sodium Level  136   136-145  mmol/L


 


Potassium Level  4.2   3.5-5.1  mmol/L


 


Chloride Level  103     mmol/L


 


Carbon Dioxide Level  28   21-32  mmol/L


 


Anion Gap  5.0   3-11  mmol/L


 


Blood Urea Nitrogen  59   7-18  mg/dl


 


Creatinine


  


  1.80


  


  


  0.60-1.20


mg/dl


 


Est Creatinine Clear Calc


Drug Dose 


  31.0


  


  


   ml/min


 


 


Estimated GFR (


American) 


  31.4


  


  


   


 


 


Estimated GFR (Non-


American 


  27.1


  


  


   


 


 


BUN/Creatinine Ratio  32.7   10-20  


 


Random Glucose  233   70-99  mg/dl


 


Calcium Level  9.6   8.5-10.1  mg/dl

## 2017-08-20 NOTE — NEPHROLOGY PROGRESS NOTE
Nephrology Progress Note


Date of Service


Aug 20, 2017.





Chief Complaint


Follow up evaluation of acute on chronic kidney injury, hyperkalemia and 

peripheral edema





Subjective


Ms. Mantilla was seen & examined in the ICU this morning.  She has responded well 

to diuretic therapy in the hospital setting.  Her leg edema has nearly 

resolved.  She is tolerating oral Bumex without side effect.  She is anxious to 

return home





Review of Systems


Constitutional:  No fever


Cardiovascular:  No chest pain, No palpitations


Respiratory:  No dyspnea at rest


Abdomen:  No pain, No nausea, No vomiting


Extremities:  + leg edema


A complete review of systems was performed.  Pertinent positives are noted 

above. All other systems are negative.





Vital Signs





Last 8 Hrs








  Date Time  Temp Pulse Resp B/P (MAP) Pulse Ox O2 Delivery O2 Flow Rate FiO2


 


8/20/17 08:01     98 Room Air  


 


8/20/17 07:17 36.7 65 19 133/83 (100) 97 Room Air  


 


8/20/17 04:00 36.5 72 18 138/72 (94) 94 Room Air  


 


8/20/17 04:00      Room Air  











Last Recorded Weight


Weight (Kilograms):  106.900





Physical Exam


General Appearance:  no apparent distress


Head:  normocephalic, atraumatic


Eyes:  PERRL, EOMI


Neck:  no adenopathy


Respiratory/Chest:  lungs clear, no accessory muscle use


Cardiovascular:  regular rate, rhythm


Abdomen/GI:  normal bowel sounds, non tender, soft


Extremities/Musculoskelatal:  + pertinent finding (trace pretibial edema)


Neurologic/Psych:  alert, oriented x 3





Social History


Smoking Status:  Never smoker


Drug Use:  none


Marital Status:  


Housing Status:  lives alone


Occupation:  retired





Laboratory Results


Past 24 Hours


8/20/17 06:10

















Test


  8/19/17


11:14 8/19/17


15:57 8/19/17


20:35 8/20/17


06:10


 


Bedside Glucose


  185 mg/dl


(70-90) 226 mg/dl


(70-90) 223 mg/dl


(70-90) 


 


 


Anion Gap


  


  


  


  5.0 mmol/L


(3-11)


 


Est Creatinine Clear Calc


Drug Dose 


  


  


  31.0 ml/min 


 


 


Estimated GFR (


American) 


  


  


  31.4 


 


 


Estimated GFR (Non-


American 


  


  


  27.1 


 


 


BUN/Creatinine Ratio    32.7 (10-20) 


 


Calcium Level


  


  


  


  9.6 mg/dl


(8.5-10.1)


 


Test


  8/20/17


06:39 


  


  


 


 


Bedside Glucose


  231 mg/dl


(70-90) 


  


  


 











Allergies


Coded Allergies:  


     Sulfamethoxazole w/Trimethoprim (Verified  Allergy, Unknown, RASH, 8/17/17)





Medications





Current Inpatient Medications








 Medications


  (Trade)  Dose


 Ordered  Sig/Will


 Route  Start Time


 Stop Time Status Last Admin


Dose Admin


 


 Heparin Sodium


  (Porcine)


  (Heparin Sq 5000


 Unit/0.5ml)  5,000 unit  Q8


 SQ  8/18/17 06:00


 9/17/17 05:59  8/20/17 06:04


5,000 UNIT


 


 Acetaminophen


  (Tylenol Tab)  650 mg  Q4H  PRN


 PO  8/18/17 01:30


 9/17/17 01:29   


 


 


 Nitroglycerin


  (Nitrostat Tab)  0.4 mg  UD  PRN


 SL  8/18/17 01:30


 9/17/17 01:29   


 


 


 Insulin Aspart


  (novoLOG ASPART)  **SLIDING


 SCALE**


 **If C...  ACHS


 SC  8/18/17 07:00


 9/17/17 06:59  8/20/17 08:13


3 UNITS


 


 Glucose


  (Glucose 40% Gel)  15-30


 GRAMS 15


 GRAMS...  UD  PRN


 PO  8/18/17 01:30


 9/17/17 01:29   


 


 


 Glucose


  (Glucose Chew


 Tab)  4-8


 Tablets 4


 Tabl...  UD  PRN


 PO  8/18/17 01:30


 9/17/17 01:29   


 


 


 Dextrose


  (Dextrose 50%


 50ML Syringe)  25-50ML OF


 50% DW IV


 FOR...  UD  PRN


 IV  8/18/17 01:30


 9/17/17 01:29   


 


 


 Glucagon


  (Glucagon Inj)  1 mg  UD  PRN


 SQ  8/18/17 01:30


 9/17/17 01:29   


 


 


 Hydromorphone HCl


  (Dilaudid Inj)  0.5 mg  Q3H  PRN


 IV  8/18/17 01:30


 9/1/17 01:29   


 


 


 Allopurinol


  (Zyloprim Tab)  300 mg  QAM


 PO  8/18/17 09:00


 9/17/17 08:59  8/20/17 08:10


300 MG


 


 Aspirin


  (Ecotrin Tab)  81 mg  QAM


 PO  8/18/17 09:00


 9/17/17 08:59  8/20/17 08:10


81 MG


 


 Gabapentin


  (Neurontin Cap)  200 mg  BID


 PO  8/18/17 09:00


 9/17/17 08:59  8/20/17 08:10


200 MG


 


 Lorazepam


  (Ativan Tab)  0.5 mg  HS  PRN


 PO  8/18/17 01:30


 9/17/17 01:29  8/19/17 21:44


0.5 MG


 


 Oxycodone/


 Acetaminophen


  (Percocet


 5-325mg Tab)  1 tab  Q6H  PRN


 PO  8/18/17 01:30


 9/1/17 01:29   


 


 


 Insulin Glargine


  (Lantus Solostar


 Pen)  5 units  BID


 SC  8/18/17 09:00


 9/17/17 08:59  8/20/17 08:14


5 UNITS


 


 Miconazole Nitrate


  (Desenex Powder)  1 appln  PRN  PRN


 EXT  8/18/17 20:15


 9/17/17 20:14   


 


 


 Bumetanide


  (Bumex Tab)  1 mg  BID17


 PO  8/19/17 17:00


 9/18/17 16:59  8/20/17 08:10


1 MG











Impression





(1) Acute renal insufficiency


(2) Chronic kidney disease, stage IV (severe)


(3) Hyperkalemia


(4) Lymphedema


(5) Coronary artery disease


(6) Junctional bradycardia


Ms. Mantilla has AODM, ASCVD and stage IV CKD (baseline creatinine 1.7 - 2.1).  

She has chronic LE lymphedema requiring large doses of diuretics to maintain 

euvolemia.  She also has a history of loop diuretic associated hypokalemia.  

She was admitted with hyperkalemia, DAVEY and junctional bradycardia.  Baseline 

heart rate by review of clinic records has been 60-70 bpm.  Radha is 

clinically improving with medical management.





Recommendations


CKD:


-- Patient has cardiorenal syndrome.  Her baseline creatinine has been 1.7 - 2.1


-- Electrolyte balance is acceptable at this time.  Continue to monitor PRP on 

daily basis





HYPERKALEMIA:


-- Corrected.  Hold potassium supplements





EDEMA:


-- Limit oral NaCl intake to 1500 mg / day


-- Continue Bumex 1 mg po BID 


-- Will prescribe medium strength knee high compression hose to be worn while 

awake and removed at bedtime





OTHER:


-- OK to discharge from Nephrology perspective.  I have placed order in 

[facility] EMR for my office RN staff to contact patient and schedule hospital 

follow up visit within 14 days.  I have placed orders in EMR to have CMP and Mg 

prior to her office visit.  Instructions discussed with patient this am.

## 2017-08-20 NOTE — DISCHARGE SUMMARY
Discharge Summary


Date of Service


Aug 20, 2017.





Discharge Summary


Admission Date:


Aug 18, 2017 at 00:44


Discharge Date:  Aug 20, 2017


Discharge Disposition:  Home


Principal Diagnosis:


HYPERKALEMIA


ARF


JUNCTIONAL RHYTHM WITH BRADYCARDIA


Secondary Diagnoses/Problems:


CAD post-stenting, chronic diastolic heart failure, EF of 55-59%


(2D echo July 2017 EF 70%, hypertension, DM2  insulin requiring,


hyperlipidemia, gout, chronic renal insufficiency


Procedures:


CT HEAD:


1.  No acute intracranial pathology. 





CXR:


1.  No acute cardiopulmonary disease.


Consultations:


NEPHROLOGY


CARDIOLOGY





Medication Reconciliation


New Medications:  


Bumetanide (Bumetanide) 1 Mg Tab


1 MG PO BID17 for 30 Days, #60 TAB 1 Refill





 


Continued Medications:  


Allopurinol (Zyloprim) 300 Mg Tab


300 MG PO QAM, TAB





Aspirin Enteric Coated (Ecotrin Or Generic) 81 Mg Tab


81 MG PO QAM, TAB





Atorvastatin (Lipitor) 80 Mg Tab


80 MG PO QAM, TAB





Gabapentin (Neurontin) 300 Mg Cap


600 MG PO QAM, CAP





Gabapentin (Neurontin) 300 Mg Cap


900 MG PO HS





Insulin Aspart (Novolog Penfill) 100 Unit/Ml Inj


14 UNITS SQ QA





Insulin Aspart (Novolog Penfill) 100 Unit/Ml Inj


16 UNITS SQ LUNCH





Insulin Aspart (Novolog Penfill) 100 Unit/Ml Inj


18 UNITS SQ SUPPER





Insulin Glargine (Lantus Solostar) 100 Unit/Ml Inj


50 UNITS SQ QAM, #30





Insulin Glargine (Lantus Solostar) 100 Unit/Ml Inj


60 SC QPM, PEN





Lorazepam (Lorazepam) 2 Mg Tab


2 MG PO HS, #30





Nitroglycerin (Nitrostat) 0.4 Mg Tab


0.4 MG UT PRN, TAB





Nystatin (Nystatin Cream) 90 Appln/30 Gm Cr


1 APPLN TOP BID PRN for RASH, #30





Oxycodone/Acetaminophen 5MG/325MG (Percocet 5MG/325MG)  Tab


1 TABLET PO Q6H PRN for Pain, TAB


PAIN


 


Discontinued Medications:  


Furosemide (Lasix) 40 Mg Tab


40 MG PO HS, TAB





Furosemide (Lasix) 40 Mg Tab


80 MG PO QAM, TAB





Metolazone (Zaroxolyn) 5 Mg Tab


5 MG PO DAILY PRN for SWELLING, TAB





Metoprolol Succinate (Toprol Xl) 25 Mg Tab


12.5 MG PO QAM





Potassium Chloride (K-Tab) 20 Meq Tab


3 TABS PO BID











Admission Information


HPI (per Admitting provider):


History obtained from patient and records.





Medical history significant for CAD post-stenting, chronic diastolic heart 

failure, EF of 55-59%


(2D echo July 2017 EF 70%, hypertension, DM2  insulin requiring,


hyperlipidemia, gout, chronic renal insufficiency (baseline creatinine 1.7 to 

2.1 as per records)





Recent confinement last September 2012 for


acute renal failure, volume overload, bradycardia. 





Recent [facility] outpatient visit last month.


Patient noted to be fluid overloaded.  


Diuretics deferred to nephrology as per note.


Felodipine stopped for possible contribution to leg swelling.


Imdur dose increased.


Patient stopped taking Imdur as she became sicker


after 2 days of treatment after notifying her cardiologist. 





24-hour Holter monitor contemplated to assess for significant bradyarrhythmias 


given complaints of chronic fatigue. Planned evaluation for obstructive sleep 

apnea


as well.  


Conclusions from Holter monitor done July 24 as follows : 


dominant rhythm sinus rhythm, frequent PVCs noted, bigeminy.  


No symptoms during episode.  





Patient seen by [facility] about 3 weeks ago.  


leg swelling improving on diuretic as per note.


Outpatient potassium noted to be low.  Kidney function stable at that time,


Furosemide decreased to 40 mg twice daily, spironolactone started.  


Patient encouraged to eat bananas as per patient.


KCl supplements prescribed.  





Yesterday the patient felt weak when she got out of bed.  


No chest pain, no shortness of breath, no headache,


some dizziness.  Good appetite.


Weight loss, decreased leg swelling with home diuretic regimen.





Patient directed by PCP to the Emergency Room.


Physical Exam (per Admitting):


VITAL SIGNS:  Blood pressure was noted to be 131/48, pulse rate of 45, RR 16,


T 37 O2 sats 98 room air.  


GENERAL:  Pleasant, no respiratory distress, obese.  Looks younger for stated


age.


SKIN:   Normal color.  


HEAD, EYES, EARS, NOSE, AND THROAT:  Pink palpebral conjunctivae, dry buccal 

mucosa 


NECK:  Short neck.


LUNGS:  Decreased effort.


HEART:  Bradycardic.


ABDOMEN:  Soft.  NT


EXTREMITIES:  Some LE edema.  No tenderness.  


NEUROLOGIC:  No gross focality.





Hospital Course


1.  Fall, Likely secondary to ARF on CRI, overdiuresis. 


Question of symptomatic bradycardia.


pt/ot


currently stable.





2. hyperkalemia 


  secondary t arf and potassium supplements


received insulin, dextrose, calcium gluconate and Kayexalate


resolved


f/u labs with pcp and nephrology





Junctional rhythm


with bradycardia


mostly from hyperkalemia' 


holding toprol xl


appreciate cardiology inputs


cardiology to followup as out patient and reinitiating of Toprol xl as per 

cardiology.








ARF on CKD stage 3


baseline cr 1.7 - 2.1


presented with cr 2.8


Lasix held initially


cr 1.7 on 8/19/17


started on Bumex by nephrology


cr 1.8 on 8/20/17


will f/u labs with nephrology





Chronic Lower extremity lymphedema


from chronic right heart failure?  with preserved EF repeat echo no failure


was requiring high doses of diuretics to keep euvolemic and loop diuretics were 

causing hypokalemia so wa taking potassium supplemenst as per nephrology


currently initiated on Bumex.f/u with nephrology and cardiology





 Hypertension, 


Toprol xl held


on Bumex


will monitor.





 Coronary artery disease status post stenting. On aspirin,B blocker on hold as 

above





 DM2, insulin requiring.  Well controlled as of recent outpatient hemoglobin 

A1c of 6.7 last April 2017


On Lantus and ISS. d/c on home meds. followup with pcp





discharged home


Total time spent on discharge = 35MINUTES


This includes examination of the patient, discharge planning, medication 

reconciliation, and communication with other providers.





Discharge Instructions


Discharge Instructions


Date of Service


Aug 20, 2017.





Admission


Reason for Admission:  Hyperkalemia





Discharge


Discharge Diagnosis / Problem:  HYPPERKAEMIA, ARF,JUNCTIONAL BRADYCARDIA





Discharge Goals


Goal(s):  Decrease discomfort, Improve function





Activity Recommendations


Activity Limitations:  resume your previous activity





.





Instructions / Follow-Up


Instructions / Follow-Up


FOLLOWUP WITH FAMILY DOCTOR Dedra Fuentes ON AUGUST 24TH AT 11AM.





FOLLOWUP WITH CARDIOLOGY AS SCHEDULED. RESTART OF TOPROL XL(METOPROLOL SUCCINATE

) AS PER CARDIOLOGY.





FOLLOWUP WITH NEPHROLOGY  AS SCHEDULED IN 1-2 WEEKS WITH LAB WORK. 

LAB WORK TO BE ORDERED BY NEPHROLOGY.





TO CHECK BLOOD SUGARS DAILY AS RECOMMENDED AND FOLLOW RESULTS WITH FAMILY 

DOCTOR. 





NEW MEDICATION:


BUMEX 1MG PO TWICE DAILY.





MEDICATIONS STOPPED:


LASIX


TOPROL XL


METOLAZONE


POTASSIUM SUPPLEMENTS





FLUID RESTRICTIONS: 1500ML/DAY








Call your Primary Care doctor if any of the following symptoms or problems 

start or get worse:





* Shortness of breath or difficulty breathing


* Wake up at night short of breath


* Chest pain


* Cough


* Swelling of your hands, feet, or legs


* More fatigued or tired with your normal activity


* Palpitations - sudden fast heart beats





WEIGHT





* Weigh yourself every morning after using the bathroom.


* Use the same scale.


* Wear the same amount of clothing.


* Write your weight down on a chart.


* Call your Primary Care doctor if you gain more than 2-3 pounds in 1-2 days.





MEDICATIONS





* Use this discharge instruction sheet for medication instructions.


* Take your medications at the time your doctor ordered.


* Do not skip a dose of your medicines.


* If you miss a dose of medicine, take it as soon as possible, but DO NOT 

DOUBLE A DOSE.


* Read your medicine information when you get home.


* Know all of the side effects of your medicine.  If in doubt, ask your 

pharmacist


* Call your Primary Care doctor's office if you have any side effects.


* Be sure all of your doctors know what medicine and herbs you take (including 

cold, flu, and herbal medicine).








Take the following with you to your follow-up doctor appointments:





* Weight Chart


* Medication List


* List of questions





Do not drink excessive alcohol, beer or wine.





Current Hospital Diet


Patient's current hospital diet: Diabetes Type 2 Diet, Low Potassium Diet (2g K)





Discharge Diet


Recommended Diet:  Diabetes Type 2 Diet, Low Potassium Diet (2g K)


Fluid Restriction:  1500 ml (6 cups)





Pending Studies


Studies pending at discharge:  no





Medical Emergencies








.


Who to Call and When:





Call 911 or go to the Emergency Room if:





* If at any time you feel your situation is an emergency


* You have tightness or pain in your chest that does not go away with rest or 

Nitroglycerin


* You are very short of breath even with rest





.





Non-Emergent Contact


Non-Emergency issues call your:  Primary Care Provider





.


.








"Provider Documentation" section prepared by Tyrone Mathis.








.





VTE Core Measure


Inpt VTE Proph given/why not?:  Unfractionated heparin SQ

## 2017-10-06 NOTE — MAMMOGRAPHY REPORT
BILATERAL DIGITAL SCREENING MAMMOGRAM WITH CAD: 10/6/2017

CLINICAL HISTORY: Routine screening.  Patient has no complaints.  





TECHNIQUE:  Current study was also evaluated with a Computer Aided Detection (CAD) system.  Bilateral
 CC and MLO views were obtained.



COMPARISON: Comparison is made to exams dated:  9/9/2016 mammogram, 9/8/2015 mammogram, 8/19/2014 blaise
mogram, 8/19/2014 stereotactic biopsy, 8/4/2014 mammogram, and 7/22/2014 mammogram - [facility].   



BREAST COMPOSITION:  There are scattered areas of fibroglandular density in both breasts.  



FINDINGS:  No suspicious masses, calcifications, or areas of architectural distortion are noted in ei
ther breast. There has been no significant interval change compared to prior exams.  A biopsy marker 
clip is again noted in the left upper outer quadrant.  Bilateral benign-appearing calcifications are 
again noted.





IMPRESSION:  ACR BI-RADS CATEGORY 2: BENIGN

There is no mammographic evidence of malignancy. A 1 year screening mammogram is recommended.  The pa
tient will receive written notification of the results.  





Approximately 10% of breast cancers are not detected with mammography. A negative mammographic report
 should not delay biopsy if a clinically suggestive mass is present.



Joanne James M.D.          

/:10/6/2017 12:15:54  



Imaging Technologist: Kelsie Hernandez, [facility]

letter sent: Normal 1/2  

BI-RADS Code: ACR BI-RADS Category 2: Benign

## 2018-01-26 NOTE — PHARMACY PROGRESS NOTE
Pharmacy Abx Initial Consult


Date of Service


Jan 26, 2018.





Pharmacy Dosing Scope


Date of Consult:  1/26/18


Consultation requested by: Dr. Mckeon





Pharmacy is consulted to initiate Vancomycin IV dosing therapy, order 

appropriate labs and adjust drug dose/frequency.





Subjective


The patient is a 75 year old female admitted on Jan 26, 2018 at 13:39.





Objective


Height (Feet):  5


Height (Inches):  2.00


Weight (Kilograms):  108.700


Vital Signs (Past 12Hrs)





Vital Signs Past 12 Hours








  Date Time  Temp Pulse Resp B/P (MAP) Pulse Ox O2 Delivery O2 Flow Rate FiO2


 


1/26/18 20:05      Room Air  


 


1/26/18 19:36 36.6 62 22 147/78 (101) 93 Room Air  


 


1/26/18 16:00     97 Room Air  


 


1/26/18 15:26 36.8 66 20 170/82 (111) 97 Room Air  


 


1/26/18 14:53  65 15 170/50 94   


 


1/26/18 14:00     93 Room Air  


 


1/26/18 13:23  67 18 199/75 93 Room Air  


 


1/26/18 12:28  68 18 179/100 95 Room Air  


 


1/26/18 12:26     95 Room Air  


 


1/26/18 12:24     95 Room Air  


 


1/26/18 12:18 36.9 82 15 174/97 95 Room Air  


 


1/26/18 12:15  67      


 


1/26/18 11:53     95 Room Air  








Lab Results (24Hrs)





Laboratory Tests (24 Hours)








Test


  1/26/18


12:00


 


Total Creatine Kinase


  312 U/L


()  H


 


White Blood Count


  5.77 K/uL


(4.8-10.8)











Assessment & Plan


Assessment








75 year old female initiated on Vancomycin IV for wound culture growing MSSA. 

ID consulted to aid in PO abx selection.








Plan








Vancomycin IV 


* Loading dose: 2750  mg  (25 mg/kg)


* Maintenance dose: 1750 mg IV (16 mg/kg) every 24 hours


* Goal trough level for cellulitis: 15 mcg/mL


* Trough level ordered for 1/28/18 @2030 prior to 2100 dose


* Recommend change to Ancef IV (due to MSSA NOT MRSA) and based on likelihood 

of Vancomycin accumulation in obese patient/patient with h/o CKD. 








Pharmacy will continue to follow and will adjust dose/frequency as necessary.  

Thank you.

## 2018-01-26 NOTE — DIAGNOSTIC IMAGING REPORT
BILATERAL LOWER EXTREMITY VENOUS DOPPLER



HISTORY:      elevated D dimer , lower extremity swelling 



COMPARISON STUDY:  None.



FINDINGS: There is normal compressibility, flow, and augmentation within the

visualized bilateral lower extremity deep venous systems. Of note, the right leg

calf vessels were not identified due to the patient's overlying bandage.



IMPRESSION:  

No DVT within the visualized right or left lower extremity.







Electronically signed by:  Aroldo Almeida M.D.

1/26/2018 5:06 PM



Dictated Date/Time:  1/26/2018 5:05 PM

## 2018-01-26 NOTE — PROGRESS NOTE
Progress Note


Date of Service


Jan 26, 2018.





Progress Note


ATTENDING NOTE: 





Wound Culture on Rt leg -Staph Aureus MSSA 





started on IV Vancomycin 





ID consult requested -recommendation for appropriate PO Abx and duration of tx 





Wound Care consulted

## 2018-01-26 NOTE — ECHOCARDIOGRAM REPORT
*NOTICE TO RECEIVING PARTY AGENCY**  This information is strictly Confidential and protected under 
[place] law.  [place] law prohibits you from making any further disclosure of this 
information unless further disclosure is expressly permitted by the written consent of the person to 
whom it pertains or is authorized by law.  A general authorization for the release of medical or 
other information is not sufficient for this purpose.  Hospital accepts no responsibility if the 
information is made available to any other person, INCLUDING THE PATIENT.



Interpretation Summary

  *  Name: JOE PACKER  Study Date: 2018 03:39 PM  BP: 170/50 mmHg

  *  MRN: [id number]  Patient Location: C.2T\S\E218\S\1  HR: 65

  *  : 1942 (M/d/yyyy)  Gender: Female  Height: 62 in

  *  Age: 75 yrs  Ethnicity: [place]  Weight: 239 lb

  *  Ordering Physician: Alena Mckeon

  *  Referring Physician: Self, Referred

  *  Performed By: Mireya Melissa RDCS

  *  Accession# [id number]  Account# [id number]

  *  Reason For Study: Chest Pain

  *  BSA: 2.1 m2

  *  -- Conclusions --

  *  No significant change compared to previous study of 17.

  *  Normal LV chamber size with mild concentric LVH, sigmoid appearing septum.

  *  Normal LV systolic function, EF 60-65%.

  *  No segmental left ventricular wall motion abnormalities are noted.

  *  Grade I diastolic dysfunction.

  *  Aortic valve sclerosis mild, without significant aortic valvular stenosis. Mild aortic 
regurgitation.

  *  Severe MAC with mild mitral regurgitation.

  *  Moderate left atrial enlargement.

Procedure Details

  *  A complete two-dimensional transthoracic echocardiogram was performed (2D, M-mode, Doppler and 
color flow Doppler).

  *  The study was technically difficult.

  *  The study was technically difficult, but visualization was adequate with the administration of 
Definity ultrasound contrast.

  *  There were technical limitations due to patient'sbody habitus

  *  A contrast injection of Definity was performed to improve assessment of LV function.

  *  Contrast was injected into an intravenous site in the left arm.

  *  One vial of Definity ultrasound contrast was diluted in normal saline to a total volume of 10 
ml.  A total of '2' ml of solution was administered during imaging.

  *  Lot # [id number] of Definity utilized for procedure.

  *  Expiration date .

  *  The attending nurse who injected the contrast agent was Miriam Hubbard RN.

Left Ventricle

  *  The left ventricle is normal in size.

  *  There is mild concentric left ventricular hypertrophy.

  *  The basal septum is thickened and angulated consistent with sigmoid septum.

  *  Ejection Fraction = 60-65%.

  *  Left ventricular systolic function is normal.

  *  No segmental left ventricular wall motion abnormalities are noted.

  *  The left ventricular wall motion is normal.

Right Ventricle

  *  The right ventricular cavity size is normal (basal dimension <4.2 cm in right ventricular 
apical 4-chamber view).

  *  The right ventricular systolic function is normal as assessed by tricuspid annular plane 
systolic excursion (TAPSE) (normal >1.5 cm).

Atria

  *  The left atrium is moderately dilated.

  *  Right atrial size is normal.

  *  No ASD detected; PFO is not assessed.

Mitral Valve

  *  There is severe mitral annular calcification.

  *  There is no mitral valve stenosis.

  *  There is mild mitral regurgitation.

Tricuspid Valve

  *  The tricuspid valve is normal.

Aortic Valve

  *  The aortic valve is trileaflet.

  *  Aortic valve sclerosis mild, without significant aortic valvular stenosis.

  *  Mild aortic regurgitation.

Pulmonic Valve

  *  The pulmonary valve is not well seen, but the Doppler examination is normal without significant 
regurgitation or stenosis.

Great Vessels

  *  The aortic root is normal size.

Pericardium/Pleural

  *  There is no pericardial effusion.

Left Ventricular Diastolic Function

  *  Grade I diastolic dysfunction, (abnormal relaxation pattern).



MMode 2D Measurements and Calculations

IVSd 1.6 cm

IVSs 2.1 cm



LVIDd 4.9 cm

LVIDs 3.1 cm

LVPWd 1.1 cm

LVPWs 1.4 cm



IVS/LVPW 1.5 

FS 36.7 %

EDV(Teich) 111.2 ml

ESV(Teich) 37.4 ml

EF(Teich) 66.3 %



EDV(cubed) 115.4 ml

ESV(cubed) 29.3 ml

EF(cubed) 74.6 %

% IVS thick 27.6 %

% LVPW thick 31.2 %





LV mass(C)d 266.2 grams

LV mass(C)dI 129.1 grams/m\S\2

LV mass(C)s 212.9 grams

LV mass(C)sI 103.2 grams/m\S\2



SV(Teich) 73.7 ml

SI(Teich) 35.8 ml/m\S\2

SV(cubed) 86.1 ml

SI(cubed) 41.8 ml/m\S\2



Ao root diam 2.7 cm

Ao root area 5.7 cm\S\2

ACS 1.9 cm

LA dimension 3.4 cm



LA/Ao 1.3 





LVAd ap4 39.9 cm\S\2

LVLd ap4 8.5 cm

EDV(MOD-sp4) 153.7 ml

EDV(sp4-el) 159.0 ml

LVAs ap4 22.3 cm\S\2

LVLs ap4 7.2 cm

ESV(MOD-sp4) 62.4 ml

ESV(sp4-el) 58.7 ml

EF(MOD-sp4) 59.4 %

EF(sp4-el) 63.1 %



LVAd ap2 29.7 cm\S\2

LVLd ap2 7.8 cm

EDV(MOD-sp2) 94.4 ml

EDV(sp2-el) 96.3 ml

LVAs ap2 14.9 cm\S\2

LVLs ap2 6.7 cm

ESV(MOD-sp2) 32.5 ml

ESV(sp2-el) 28.1 ml

EF(MOD-sp2) 65.6 %

EF(sp2-el) 70.8 %



LVLd %diff -9.15 %

EDV(MOD-bp) 126.6 ml

LVLs %diff -7.31 %

ESV(MOD-bp) 46.3 ml

EF(MOD-bp) 63.4 %



SV(MOD-sp4) 91.3 ml

SI(MOD-sp4) 44.3 ml/m\S\2





SV(MOD-sp2) 61.9 ml

SI(MOD-sp2) 30.0 ml/m\S\2



SV(MOD-bp) 80.3 ml

SI(MOD-bp) 39.0 ml/m\S\2



SV(sp4-el) 100.4 ml

SI(sp4-el) 48.7 ml/m\S\2



SV(sp2-el) 68.2 ml

SI(sp2-el) 33.1 ml/m\S\2







Doppler Measurements and Calculations

MV E max lavelle 138.0 cm/sec

MV A max lavelle 143.4 cm/sec



MV E/A 0.96 



MV dec time 0.31 sec



Ao V2 max 178.3 cm/sec

Ao max PG 12.7 mmHg

Ao max PG (full) 6.7 mmHg





AI max lavelle 251.7 cm/sec

AI max PG 25.3 mmHg

AI dec slope 142.4 cm/sec\S\2

AI P1/2t 517.5 msec



LV V1 max PG 6.0 mmHg



LV V1 max 122.9 cm/sec



PA V2 max 103.1 cm/sec

PA max PG 4.3 mmHg





TR max lavelle 256.2 cm/sec

## 2018-01-26 NOTE — EMERGENCY ROOM VISIT NOTE
History


Report prepared by Angela:  Abhi Eason


Under the Supervision of:  Dr. Wilbert Reed M.D.


First contact with patient:  12:59


Chief Complaint:  CHEST PAIN


Stated Complaint:  SHOULDER PAIN/CHEST PAIN


Nursing Triage Summary:  


Pt arrives ALS for evaluation of pain between shoulder blades and mid chest 


pressure which began on  Tuesday, 1/23/17.  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  Today the pain continues and took a NTG s/l around 0810

; 


reports took the "edge" off the pain/discomfort.  Pt then went to her PCP and 


was sent to the ED for further evalution.  Pt received second NTG s/l in route 


to ED.  Pt reports she is DM/insulin dependent, NSTEMI, wound to rge right 


lateral lower leg, sees the  wound clinic for this.  Wears 2/l o2 AT [facility]





History of Present Illness


The patient is a 75 year old female who presents to the Emergency Room via 

ambulance with complaints of intermittent chest pain for the past three days 

which she describes as a pressure. The patient states that she currently has no 

pain, though the pain was a 4/10 this morning and lasted approximately 15 

minutes. She additionally states that she is having pain between her shoulder 

blades, though she denies any shoulder, arm, or jaw pain. She additionally 

denies any shortness of breath, nausea, or sweating. The patient states that 

she had an episode three days ago, yesterday, and this morning, and she was 

seen at her PCP, and they sent her to be evaluated. The patient was given nitro 

and aspirin en route, and these both relived the pain. The patient had a 

coronary stent placed 15 years ago. The patient notes that she has been taking 

all of her medications. She additionally states that she has been having a 

little more shortness of breath on exertion recently.





   Source of History:  patient


   Onset:  three days ago


   Position:  chest


   Symptom Intensity:  4/10


   Quality:  pressure


   Timing:  intermittent


   Modifying Factors (Relieving):  other (nitro and aspirin)


   Associated Symptoms:  No SOB, No nausea


Note:


Associated symptoms: Pain between her shoulder blades.





Review of Systems


See HPI for pertinent positives & negatives. A total of 10 systems reviewed and 

were otherwise negative.





Past Medical & Surgical


Medical Problems:


(1) Acute renal insufficiency


(2) Benign hypertension


(3) Chronic kidney disease stage 3


(4) Chronic kidney disease, stage IV (severe)


(5) Coronary artery disease


(6) Coronary artery disease


(7) Diabetes mellitus type 2


(8) Dyslipidemia


(9) Glaucoma


(10) Gout


(11) Hyperkalemia


(12) Junctional bradycardia


(13) Lymphedema


(14) Morbid obesity


(15) Neuropathy








Social History


Smoking Status:  Never Smoker


Drug Use:  none


Marital Status:  


Housing Status:  lives with family


Occupation Status:  retired





Current/Historical Medications


Scheduled


Allopurinol (Zyloprim), 300 MG PO QAM


Aspirin Enteric Coated (Ecotrin Or Generic), 81 MG PO QAM


Atorvastatin (Lipitor), 80 MG PO QAM


Bumetanide (Bumetanide), 1 MG PO BID17


Gabapentin (Neurontin), 600 MG PO QAM


Gabapentin (Neurontin), 900 MG PO HS


Insulin Aspart (Novolog Penfill), 14 UNITS SQ QA


Insulin Aspart (Novolog Penfill), 16 UNITS SQ LUNCH


Insulin Aspart (Novolog Penfill), 18 UNITS SQ SUPPER


Insulin Glargine (Lantus Solostar), 50 UNITS SQ QAM


Insulin Glargine (Lantus Solostar), 30 UNITS SC QPM


Nitroglycerin (Nitrostat), 0.4 MG UT PRN


Potassium Ext Rel (Klor-Con), 60 MEQ PO BID





Scheduled PRN


Lorazepam (Lorazepam), 2 MG PO HS PRN for Sleep





Allergies


Coded Allergies:  


     Sulfamethoxazole w/Trimethoprim (Verified  Allergy, Unknown, RASH, 1/26/18)


     Codeine (Verified  Adverse Reaction, Mild, nausea, 1/26/18)





Physical Exam


Vital Signs











  Date Time  Temp Pulse Resp B/P (MAP) Pulse Ox O2 Delivery O2 Flow Rate FiO2


 


1/26/18 13:23  67 18 199/75 93 Room Air  


 


1/26/18 12:28  68 18 179/100 95 Room Air  


 


1/26/18 12:26     95 Room Air  


 


1/26/18 12:24     95 Room Air  


 


1/26/18 12:18 36.9 82 15 174/97 95 Room Air  


 


1/26/18 12:15  67      


 


1/26/18 11:53     95 Room Air  











Physical Exam


GENERAL: Patient is in no acute distress.


HEENT: No acute trauma, normocephalic atraumatic, mucous membranes moist, no 

nasal congestion, no scleral icterus.


NECK: No stridor, no adenopathy, no meningismus, trachea is midline.


LUNGS: Clear to auscultation bilaterally, no wheeze, no rhonchi, breath sounds 

equal.


HEART: Without murmurs gallops or rubs, regular rate and rhythm.


ABDOMEN: Soft, nontender, bowel sounds positive, no hernias, no peritonitis.


EXTREMITIES: No obvious trauma. Wrap on the right leg. Left leg shows some mild 

edema.


NEUROLOGIC: Oriented x 3, no acute motor or sensory deficits, no focal weakness.


SKIN: No rash, no jaundice, no diaphoresis.





Medical Decision & Procedures


ER Provider


Diagnostic Interpretation:


Radiology results as stated below per my review and radiologist interpretation:











SINGLE VIEW CHEST





CLINICAL HISTORY:  Atypical chest pain.





FINDINGS: An AP, portable, upright chest radiograph is compared to study dated


8/17/2017. The examination is degraded by portable technique and patient


rotation.  The heart is mildly enlarged and there is atherosclerotic


calcification of the thoracic aorta. The pulmonary vasculature is noncongested.


Chronic interstitial thickening is similar to previous. The lungs and pleural


spaces are clear. No pneumothorax is seen. The skeletal structures are


osteopenic. The bony thorax is grossly intact.





IMPRESSION: Cardiomegaly with no acute cardiopulmonary abnormality. 





Electronically signed by:  Wilbert Barron M.D.


1/26/2018 1:00 PM





Dictated Date/Time:  1/26/2018 1:00 PM





Laboratory Results


1/26/18 12:00








1/26/18 12:00

















Test


  1/26/18


12:00 1/26/18


12:54 1/26/18


13:29


 


Red Blood Count


  4.50 M/uL


(4.2-5.4) 


  


 


 


Mean Corpuscular Volume


  95.8 fL


() 


  


 


 


Mean Corpuscular Hemoglobin


  31.8 pg


(25-34) 


  


 


 


Mean Corpuscular Hemoglobin


Concent 33.2 g/dl


(32-36) 


  


 


 


RDW Standard Deviation


  50.1 fL


(36.4-46.3) 


  


 


 


RDW Coefficient of Variation


  14.4 %


(11.5-14.5) 


  


 


 


Mean Platelet Volume


  11.8 fL


(7.4-10.4) 


  


 


 


Prothrombin Time


  10.4 SECONDS


(9.0-12.0) 


  


 


 


Prothromb Time International


Ratio 1.0 (0.9-1.1) 


  


  


 


 


Activated Partial


Thromboplast Time 25.8 SECONDS


(21.0-31.0) 


  


 


 


Partial Thromboplastin Ratio 1.0   


 


Anion Gap


  4.0 mmol/L


(3-11) 


  


 


 


Est Creatinine Clear Calc


Drug Dose 43.1 ml/min 


  


  


 


 


Estimated GFR (


American) 46.0 


  


  


 


 


Estimated GFR (Non-


American 39.7 


  


  


 


 


BUN/Creatinine Ratio 27.6 (10-20)   


 


Calcium Level


  10.0 mg/dl


(8.5-10.1) 


  


 


 


Total Bilirubin


  0.5 mg/dl


(0.2-1) 


  


 


 


Aspartate Amino Transf


(AST/SGOT) 30 U/L (15-37) 


  


  


 


 


Alanine Aminotransferase


(ALT/SGPT) 35 U/L (12-78) 


  


  


 


 


Alkaline Phosphatase


  84 U/L


() 


  


 


 


Total Creatine Kinase


  312 U/L


() 


  


 


 


Creatine Kinase MB


  5.3 ng/ml


(0.5-3.6) 


  


 


 


Creatine Kinase MB Ratio 1.7 (0-3.0)   


 


Total Protein


  8.0 gm/dl


(6.4-8.2) 


  


 


 


Albumin


  3.6 gm/dl


(3.4-5.0) 


  


 


 


Globulin


  4.4 gm/dl


(2.5-4.0) 


  


 


 


Albumin/Globulin Ratio 0.8 (0.9-2)   


 


Bedside Troponin I


  


  < 0.030 ng/ml


(0-0.045) 


 








Laboratory results reviewed by me.





Medications Administered











 Medications


  (Trade)  Dose


 Ordered  Sig/Will


 Route  Start Time


 Stop Time Status Last Admin


Dose Admin


 


 Nitroglycerin


  (Nitroglycerin


 2% Oint)  1 inch  NOW  STAT


 EXT  1/26/18 13:03


 1/26/18 13:06 DC 1/26/18 13:21


1 INCH











ECG


Indication:  chest pain


Rate (beats per minute):  69


Rhythm:  normal sinus


Findings:  no acute ischemic change, no ectopy


Change:








Patient's EKG interpreted by me.





ED Course


1259: The patient was evaluated in room B12. A complete history and physical 

exam was performed. I discussed the treatment plan with her, and she was 

agreeable. She will be evaluated for further treatment.





1303: Nitroglycerin 1 inch EXT





1325: [facility] The 

patient will be evaluated for further management.





Medical Decision


Differential diagnoses include: angina, MI, musculoskeletal pain, anemia, 

electrolyte imbalance, pneumonia, CHF, PE, aortic dissection.





There is no leukocytosis or concerning anemia.  No coagulopathy.  No evidence 

for renal failure or for significant electrolyte abnormality that requires 

correction.  There was no hepatitis.  EKG shows a normal sinus rhythm, there 

was no evidence for ischemia.  Cardiac enzyme testing times one does not 

suggest acute cardiac injury.  Chest film does not show pneumonia, mediastinal 

widening or pneumothorax.  Currently, the patient is pain-free.





The patient presents with chest pressure that moves into her back.  She has had 

a coronary stent in the past.  Today, nitroglycerin has relieved her pain.  She 

had received oral aspirin prior to arrival, no additional aspirin was given.





The patient received 1 inch of Nitropaste.





I do think a hospital stay is warranted, angina is a possibility.  Further 

cardiac workup is required.  I spoke to the patient and case management.  The on

-call hospitalist was consulted.





Medication Reconcilliation


Current Medication List:  was personally reviewed by me





Blood Pressure Screening


Patient's blood pressure:  Elevated blood pressure


Monitored by the hospitalist.





Consults


Time Called:  1310


Consulting Physician:  Dr. Mike Oh Hospitalist


Returned Call:  1325


Discussed the patient's case with Dr. Mike Oh Hospitalist. The 

patient will be evaluated for further management.





Impression





 Primary Impression:  


 Precordial chest pain





Scribe Attestation


The scribe's documentation has been prepared under my direction and personally 

reviewed by me in its entirety. I confirm that the note above accurately 

reflects all work, treatment, procedures, and medical decision making performed 

by me.





Departure Information


Dispostion


Being Evaluated By Hospitalist





Referrals


Dedra Marcos M.D. (PCP)





Patient Instructions


[facility]

## 2018-01-26 NOTE — DIAGNOSTIC IMAGING REPORT
SINGLE VIEW CHEST



CLINICAL HISTORY:  Atypical chest pain.



FINDINGS: An AP, portable, upright chest radiograph is compared to study dated

8/17/2017. The examination is degraded by portable technique and patient

rotation.  The heart is mildly enlarged and there is atherosclerotic

calcification of the thoracic aorta. The pulmonary vasculature is noncongested.

Chronic interstitial thickening is similar to previous. The lungs and pleural

spaces are clear. No pneumothorax is seen. The skeletal structures are

osteopenic. The bony thorax is grossly intact.



IMPRESSION: Cardiomegaly with no acute cardiopulmonary abnormality. 







Electronically signed by:  Wilbert Barron M.D.

1/26/2018 1:00 PM



Dictated Date/Time:  1/26/2018 1:00 PM

## 2018-01-26 NOTE — HISTORY AND PHYSICAL
History & Physical


Date & Time of Service:


Jan 26, 2018 at 14:20


Chief Complaint:


Shoulder Pain/Chest Pain


Primary Care Physician:


Dedra Marcos M.D.


History of Present Illness


Source:  patient, family, clinic records, hospital records


Pt is 76 y/o F with PMH HTN, insulin dependent DM II, CKD IV, dyslipidemia, 

NSTEMI s/p stent RCA in 2002, diastolic dysfunction, obesity and gout presented 

to ER from PCP office with c/o CP. Pt states 3 days ago started with aching to 

back near L scapula. She states pain was intermittent. Then yesterday with L 

upper back pain with associated heavy sensation of mid chest "like hand pushing 

on my chest". Had pain again this morning and took one SL nitro with decreased 

pain and went to PCP. Sent from PCP office to ER. En route by EMS was given 

another nitro and 324mg ASA. Pt states no further chest or back discomfort. Pt 

reports chronic LE edema, left leg always being worse then right. States 

yesterday noticed increased swelling and took extra 1/2 tab of bumex and 

reports decreased edema today. Following with wound clinic for wound to R lower 

leg. Has area wrapped once a week. Denies fever/chills, diaphoresis, N/V/D/C, HA

, dizziness, syncope, vision changes, neck pain, SOB, orthopnea, palpitations, 

cough, sore throat, choking, otalgia, rhinorrhea, abdominal pain, paresthesias, 

weakness, urinary symptoms.





8/2017 hospitalization - transient junctional bradycardia with hyperkalemia. 

Toprol 12.5mg daily was d/c at that time.





Past Medical/Surgical History


Medical Problems:


(1) Acute renal failure syndrome


Status: Resolved  





(2) Acute renal insufficiency


Status: Resolved  





(3) Benign hypertension


Status: Chronic  





(4) Bradycardia


Status: Resolved  





(5) Chronic kidney disease, stage IV (severe)


Status: Chronic  





(6) Coronary artery disease


Status: Chronic  





(7) Diabetes mellitus type 2


Status: Chronic  





(8) Dyslipidemia


Status: Chronic  





(9) Edema of leg


Status: Chronic  





(10) Glaucoma


Status: Chronic  





(11) Gout


Status: Chronic  





(12) Hyperkalemia


Status: Resolved  





(13) Junctional bradycardia


Status: Resolved  





(14) Lymphedema


Status: Chronic  





(15) Morbid obesity


Status: Chronic  





(16) Neuropathy


Status: Chronic  





Surgical Problems:


(1) History of knee replacement procedure of right knee


Status: Resolved  





(2) Hx of heart artery stent


Permanent Comment: 2002 - RCA metal stent


Status: Resolved  








Family History





Diabetes mellitus


FH: CAD (coronary artery disease)


Hypertension


Stroke





Social History


Smoking Status:  Never Smoker


Smokeless Tobacco Use:  No


Alcohol Use:  none


Drug Use:  none


Marital Status:  


Housing status:  lives alone


Occupational Status:  retired





Immunizations


History of Influenza Vaccine:  Yes


Influenza Vaccine Date:  Sep 11, 2012


History of Tetanus Vaccine?:  No


History of Pneumococcal:  Yes


History of Hepatitis B Vaccine:  Unknown





Multi-Drug Resistant Organisms


History of MDRO:  No





Allergies


Coded Allergies:  


     Sulfamethoxazole w/Trimethoprim (Verified  Allergy, Unknown, RASH, 1/26/18)


     Codeine (Verified  Adverse Reaction, Mild, nausea, 1/26/18)





Home Medications


Scheduled


Allopurinol (Zyloprim), 300 MG PO QAM


Aspirin Enteric Coated (Ecotrin Or Generic), 81 MG PO QAM


Atorvastatin (Lipitor), 80 MG PO QAM


Bumetanide (Bumetanide), 1 MG PO BID17


Gabapentin (Neurontin), 600 MG PO QAM


Gabapentin (Neurontin), 900 MG PO HS


Insulin Aspart (Novolog Penfill), 14 UNITS SQ QA


Insulin Aspart (Novolog Penfill), 16 UNITS SQ LUNCH


Insulin Aspart (Novolog Penfill), 18 UNITS SQ SUPPER


Insulin Glargine (Lantus Solostar), 50 UNITS SQ QAM


Insulin Glargine (Lantus Solostar), 30 UNITS SC QPM


Nitroglycerin (Nitrostat), 0.4 MG UT PRN


Potassium Ext Rel (Klor-Con), 60 MEQ PO BID





Scheduled PRN


Lorazepam (Lorazepam), 2 MG PO HS PRN for Sleep





Review of Systems


Constitutional:  No fever, No chills, No sweats, No weight loss, No weakness, 

No fatigue


Eyes:  No worsening of vision, No eye pain, No redness, No discharge


ENT:  No unusual epistaxis, No nasal symptoms, No sore throat, No trouble 

swallowing


Respiratory:  No cough, No sputum, No wheezing, No shortness of breath, No 

dyspnea on exertion, No dyspnea at rest, No hemoptysis


Cardiovascular:  + chest pain (see HPI), + problem reported, No orthopnea, No 

PND, No claudication, No palpitations


Abdomen:  No pain, No nausea, No vomiting, No diarrhea, No constipation, No GI 

bleeding


Musculoskeletal:  + joint pain (chronic low back pain, denies worsening), No 

calf pain


Genitourinary - Female:  No dysuria, No urinary frequency, No urinary urgency, 

No urinary retention, No hematuria


Neurologic:  No paralysis, No weakness, No numbness/tingling, No vertigo


Psychiatric:  + insomnia, No depression symptoms, No anxiety


Endocrine:  No excessive thirst, No excessive urination


Hematologic / Lymphatic:  No abnormal bleeding/bruising, No clotting problems, 

No night sweats


Integumentary:  + problem reported (see HPI)





Physical Exam


Vital Signs











  Date Time  Temp Pulse Resp B/P (MAP) Pulse Ox O2 Delivery O2 Flow Rate FiO2


 


1/26/18 13:23  67 18 199/75 93 Room Air  


 


1/26/18 12:28  68 18 179/100 95 Room Air  


 


1/26/18 12:26     95 Room Air  


 


1/26/18 12:24     95 Room Air  


 


1/26/18 12:18 36.9 82 15 174/97 95 Room Air  


 


1/26/18 12:15  67      


 


1/26/18 11:53     95 Room Air  








General Appearance:  no apparent distress, + obese


Head:  normocephalic, atraumatic


Eyes:  normal inspection, PERRL, EOMI, sclerae normal


ENT:  hearing grossly normal, pharynx normal, + pertinent finding (mucous 

membranes moist)


Neck:  supple, no JVD, trachea midline


Respiratory/Chest:  chest non-tender, lungs clear, normal breath sounds, no 

respiratory distress, no accessory muscle use


Cardiovascular:  regular rate, rhythm, no murmur, normal peripheral pulses


Abdomen/GI:  normal bowel sounds, non tender, soft


Back:  + pertinent finding (healed surgical incision to mid upper back without 

tenderness to palpation. Left scapular region without tenderness to palpation 

and no noted mass.)


Extremities/Musculoskelatal:  no calf tenderness, normal capillary refill, non-

tender (1+edema LE. RLE with wrap from wound clinic in place.)


Neurologic/Psych:  alert, normal mood/affect, oriented x 3


Skin:  normal color, warm/dry





Diagnostics


Laboratory Results





Results Past 24 Hours








Test


  1/26/18


12:00 1/26/18


12:54 Range/Units


 


 


White Blood Count 5.77  4.8-10.8  K/uL


 


Red Blood Count 4.50  4.2-5.4  M/uL


 


Hemoglobin 14.3  12.0-16.0  g/dL


 


Hematocrit 43.1  37-47  %


 


Mean Corpuscular Volume 95.8    fL


 


Mean Corpuscular Hemoglobin 31.8  25-34  pg


 


Mean Corpuscular Hemoglobin


Concent 33.2


  


  32-36  g/dl


 


 


RDW Standard Deviation 50.1  36.4-46.3  fL


 


RDW Coefficient of Variation 14.4  11.5-14.5  %


 


Platelet Count 167  130-400  K/uL


 


Mean Platelet Volume 11.8  7.4-10.4  fL


 


Prothrombin Time


  10.4


  


  9.0-12.0


SECONDS


 


Prothromb Time International


Ratio 1.0


  


  0.9-1.1  


 


 


Activated Partial


Thromboplast Time 25.8


  


  21.0-31.0


SECONDS


 


Partial Thromboplastin Ratio 1.0   


 


Sodium Level 137  136-145  mmol/L


 


Potassium Level 3.5  3.5-5.1  mmol/L


 


Chloride Level 100    mmol/L


 


Carbon Dioxide Level 33  21-32  mmol/L


 


Anion Gap 4.0  3-11  mmol/L


 


Blood Urea Nitrogen 36  7-18  mg/dl


 


Creatinine


  1.31


  


  0.60-1.20


mg/dl


 


Est Creatinine Clear Calc


Drug Dose 43.1


  


   ml/min


 


 


Estimated GFR (


American) 46.0


  


   


 


 


Estimated GFR (Non-


American 39.7


  


   


 


 


BUN/Creatinine Ratio 27.6  10-20  


 


Random Glucose 115  70-99  mg/dl


 


Calcium Level 10.0  8.5-10.1  mg/dl


 


Magnesium Level 1.9  1.8-2.4  mg/dl


 


Total Bilirubin 0.5  0.2-1  mg/dl


 


Aspartate Amino Transf


(AST/SGOT) 30


  


  15-37  U/L


 


 


Alanine Aminotransferase


(ALT/SGPT) 35


  


  12-78  U/L


 


 


Alkaline Phosphatase 84    U/L


 


Total Creatine Kinase 312    U/L


 


Creatine Kinase MB 5.3  0.5-3.6  ng/ml


 


Creatine Kinase MB Ratio 1.7  0-3.0  


 


Troponin I 0.017  0-0.045  ng/ml


 


Total Protein 8.0  6.4-8.2  gm/dl


 


Albumin 3.6  3.4-5.0  gm/dl


 


Globulin 4.4  2.5-4.0  gm/dl


 


Albumin/Globulin Ratio 0.8  0.9-2  


 


Bedside Troponin I  < 0.030 0-0.045  ng/ml











Diagnostic Radiology


CXR:


IMPRESSION: Cardiomegaly with no acute cardiopulmonary abnormality.





EKG


11:52 AM. EKG: NSR, rate 69, no ST elevation noted





Impression


Assessment and Plan


CHEST PAIN/ANGINA R/O ACS. Risk factors: HTN, hyperlipidemia, DM, obesity


Pt with mid sternal chest heaviness x 2 days with left scapular discomfort. 

Took 1 SL nitro this am with decrease chest and back discomfort. EMS gave 1 SL 

nitro and 324mg ASA and pt pain free. In ER nitro paste placed and continues to 

be pain free. EKG: NSR, no ST elevations noted. Negative troponin. Pending 

magnesium. CXR: cardiomegaly, no acute changes. 


-Monitor Vitals


- Repeat EKG in am


- Will trend cardiac enzymes


- ECHO


-Cardiology consult


-continue statin


-ASA 


-Nitro paste


-continue bumex


-repeat cbc, prp in am





HTN


/75. Pt has 1 inch nitro paste on. Denies HA.


-continue nitro paste


-Norvasc 10mg po now, per cardiology recommendations





DM II


HA1C in am. 


-continue home Lantus dose 50U in am, 30U in pm


-NovoLog sliding scale per protocol





CKD IV


Cr: 1.3 today. (baseline 1.4). GFR: 39 today putting in CKD III stage


-continue to monitor


-avoid nephrotoxic agents when possible 





WOUND R LEG


-wound nurse consult





GOUT


no current flare


-continue allopurinol 





DVT PROPHYLAXIS


-Heparin SQ





DISPOSITION


-admit tele obs


-Full Code as per discussion with pt


-Follows with Dr Marcos for routine care





Pt was seen with Dr Mckeon. See addendum





ATTENDING ADDENDUM : 





pt seen and examined, care co ordinated with Misti Novoa PA-C 


labs and images reviewed 





74 yo F with past M hx of CAD , HTN , Type 2 DM , presents with angina symptom 

intermittently , symptom not related to exertion 


had mid chest heaviness , radiation to back , no SOB , no diaphoresis or 

syncope 


symptom relief with SL Nitro 





saw her Family physician at [facility] , was sent to ER via 

ambulance for unstable angina 





in the ER pt was chest pain free , found to be Hypertensive with SBP > 190 


EKG no ischemic change 


troponin X1 negative 





P/E: : 


Gen : pleasant , no sign of distress 


HEENT: sclera non icteric , PERRLA/EOMI 


HT: regular s1/S2 , no JVD , + 1  bilateral lower ext edema 


Lungs: no wheeze or rales 


Abdomen : soft, non tender 


Ext : bilateral chronic lymphedema , + 1 edema , rt leg dressing present -due 

to chronic wound , follows at wound clinic 


Neuro: no focal deficit 














Last 8 Hrs








  Date Time  Temp Pulse Resp B/P (MAP) Pulse Ox O2 Delivery O2 Flow Rate FiO2


 


1/26/18 15:26 36.8 66 20 170/82 (111) 97 Room Air  


 


1/26/18 14:53  65 15 170/50 94   


 


1/26/18 14:00     93 Room Air  


 


1/26/18 13:23  67 18 199/75 93 Room Air  


 


1/26/18 12:28  68 18 179/100 95 Room Air  


 


1/26/18 12:26     95 Room Air  


 


1/26/18 12:24     95 Room Air  


 


1/26/18 12:18 36.9 82 15 174/97 95 Room Air  


 


1/26/18 12:15  67      


 


1/26/18 11:53     95 Room Air  











A/P: 








CHEST HEAVINESS /UNSTABLE ANGINA : 


presents typical angina symptom , with relief with SL nitro 


not associated with exertion 


hx of CAD , NSTEMI in 2002 , s/p PTCA 


will be admitted to Tele for further cardiac work up 


resting ECHO , serial troponin ordered 


Cardiology eval requested 


pt was chest pain free during my evaluation 





RT LOWER EXT CHRONIC INFECTION : 


follows with wound care center 


wound care consulted 





Please refer to Leila Novoa PA-c documentation for further discussion of 

other issues 





Alena Mckeon MD





Level of Care


Telemetry





Resuscitation Status


FULL RESUSCITATION





VTE Prophylaxis


VTE Risk Assessment Done? Y/N:  Yes


Risk Level:  Moderate


Given or contraindicated:  Unfractionated heparin SQ





Additional Copies To


Dedra Marcos M.D.

## 2018-01-27 NOTE — CARDIOLOGY CONSULTATION
DATE OF CONSULTATION:  2018

 

CONSULTATION REQUESTED BY:  Alena Mckeon MD

 

REASON FOR CONSULTATION:  Chest pain.

 

HISTORY OF PRESENT ILLNESS:  Mrs. Mantilla is a very pleasant 75-year-old woman

who normally follows with Mark Lombardo of our cardiology practice.  She

presented to [facility] Emergency Department on 2018

with few days' worth of chest and back pain.  The patient states that she had

a very stressful time at home as of late after a nephew recently suffered a

spontaneous aortic dissection, and 3 days prior to admission she developed

some back pain.  She described it as a dull achy sensation between her

shoulder blades and just seemed to be musculoskeletal.  However, she then

had pain waxing and waning for the next few days until it started to radiate

through to her chest.  At that time, she became concerned and was seen by

Family Medicine at [facility].  She was seen there, an EKG was performed

which was unremarkable and she was directed to the Emergency Department. 

Upon presentation in the Emergency Department, again her EKG was

unremarkable, but she was rather hypertensive with a peak blood pressure in

the ER of 199/75.  She was given sublingual nitroglycerin with resolution of

the discomfort and beta blockers were avoided given her history of junctional

bradycardia on low dose metoprolol.  I was contacted by Dr. Mckeon and I

recommended giving patient a dose of amlodipine for further blood pressure

control.  She is now on a nitropatch and received amlodipine, her blood

pressure is improving and she states now she feels fine.  She has not had any

further discomfort.  She states that this is not similar to the discomfort

she had prior to her non-STEMI in the past.  Otherwise, patient states that

she has been in her normal state of health lately, and denies any medication

changes or any other significant changes of her routine.  She has been

compliant with her medications.

 

PAST SURGICAL HISTORY:

1.  Knee replacement.

2.  Sphincterotomy.

3.  Finger amputation.

4.   x3.

5.  Cardiac catheterization in , receiving a bare metal stent to the RCA

at that time.

 

MEDICAL ILLNESSES:

1.  Coronary artery disease with a history of non-STEMI status post PCI to

the RCA with residual 60% stenosis of the LAD and 50% stenosis of a large

diagonal.

2.  Normal LV systolic function with diastolic dysfunction.

3.  Hypertension.

4.  Dyslipidemia.

5.  Diabetes.

6.  Chronic kidney disease with recurrent episodes of significant renal

failure.

7.  Recurrent hyperkalemia with resultant junctional bradyarrhythmias.

8.  Gout.

9.  Obesity.

10.  Anemia.

 

FAMILY HISTORY:  Noncontributory.

 

SOCIAL HISTORY:  The patient denies any alcohol, tobacco or recreational drug

use.  She is .  She has 3 daughters.

 

ALLERGIES:

1.  BACTRIM.

2.  CODEINE.

 

MEDICATIONS AS AN OUTPATIENT:

1.  Aspirin 81 mg daily.

2.  Bumex 1 mg b.i.d.

3.  Potassium chloride 20 mEq b.i.d.

4.  Atorvastatin 80 mg daily.

5.  Lantus.

6.  Neurontin.

 

PHYSICAL EXAMINATION:

VITALS:  Temperature is 36.8, pulse 66, respiratory rate 12, blood pressure

170/82.

GENERAL:  Awake, alert, oriented x3, in no acute distress.

HEENT:  Normocephalic, atraumatic.  Pupils equal, round, and reactive to

light and accommodation.  Extraocular muscles intact.  Anicteric sclerae. 

Moist mucous membranes.

NECK:  No JVD, no bruit.

CARDIOVASCULAR:  Regular.  Positive S4.  Normal S1 and S2.  No S3.  No

murmurs or rubs.

PULMONARY:  Clear to auscultation bilaterally.  No rales, rhonchi or wheezes.

EXTREMITIES:  No clubbing or cyanosis.  +1 bilateral lower extremity

nonpitting edema with right lower extremity bandaged for a venous stasis

ulcer.

SKIN:  Warm and dry.

 

TEST RESULTS:  Chest x-ray was read as cardiomegaly with no acute

cardiopulmonary abnormality, no significant enlargement of her mediastinum.

 

Most recent echocardiogram on 2017 was read as moderate concentric LVH

with sigmoid appearing septum, normal LV systolic function, EF 55% to 59%,

moderate left atrial enlargement, severe mitral annular calcification, mild

mitral regurgitation, mild aortic valve sclerosis with mild aortic

regurgitation, normal size aortic root and ascending aorta.

 

A 12-lead EKG performed in the Emergency Department independently reviewed at

this time shows normal sinus rhythm at 69 beats per minute, normal axis,

normal intervals, normal study.  No significant change compared to previous

studies.

 

LABORATORY STUDIES OF SIGNIFICANCE:  Sodium 137, potassium 3.5, BUN 36,

creatinine 1.31.  Troponin negative x2, CK of 312.

 

IMPRESSION:

1.  Chest pain secondary to hypertensive urgency.

2.  History of coronary artery disease, status post percutaneous coronary

intervention to the right coronary artery in  with residual disease of

the left anterior descending artery and diagonal.

3.  Diastolic dysfunction with normal left ventricular systolic function.

4.  Chronic renal insufficiency.

5.  History of junctional bradycardia.

6.  Chronic recurrent hyperkalemia resulting in junctional bradyarrhythmias.

 

RECOMMENDATIONS:  It was my pleasure to see Mrs. Mantilla in consultation today.

 From a cardiac standpoint, I believe patient is suffering from hypertensive

urgency and given the fact that her symptoms have resolved with improvement

of her blood pressure; I believe that strict blood pressure control is

indicated at this point.  So given her history of chronic kidney disease and

recurrent decompensations of her renal function along with her history of

junctional arrhythmias on beta blocker, at this time she will be started on

amlodipine 10 mg x1 now and we will further follow her blood pressure from

there.  Consideration will be given to adding nitrates and hydralazine given

her renal function, and stress testing will likely be completed as an

outpatient once her blood pressure is controlled.  Otherwise, she will be

continued on her aspirin and atorvastatin.

 

 

 

ION

## 2018-01-27 NOTE — NEPHROLOGY CONSULTATION
Nephrology Consultation


Date & Providers





Date of Consultation:  Jan 27, 2018.





Primary Care Provider:  Dedra Marcos M.D.





Referring Provider:





Reason for Consultation


Evaluation management for chronic kidney disease and hypertensive urgency.





History of Present Illness


Mrs. Radha Mantilla is a 75-year-old female with past medical history significant 

for stage 3 chronic kidney disease, hypertension, diabetes, coronary artery 

disease admitted to the hospital with chest pain and hypertensive urgency.  

Nephrologic consult was requested to manage hypertensive urgency and chronic 

kidney disease.  Electronic medical records including labs and imaging are 

reviewed in detail during patient's visit.  Family was at bedside during the 

visit.





Radha has stage 3 chronic kidney disease, baseline creatinine has been quite 

variable from 1.5-2.0, most likely secondary to hypertensive nephropathy, 

follows in the nephrology clinic with Dr. Moreno. Prior urinalysis has been 

benign, no history of hematuria proteinuria.  She has history of lymphedema 

requiring high dose of diuretics as baseline, has been on potassium supplement 

for hypokalemia however previously had history of episodes of hyperkalemia as 

well. Her baseline dry weight is approximately 230 lb.  Currently she is at 237 

lb





She was admitted to the hospital yesterday with chest pain.  On admission she 

was found to be in hypertensive urgency, systolic blood pressure was 1 80s to 

190s.  EKG was unremarkable, cardiac enzymes are normal.  D-dimer was elevated 

but Doppler of lower extremity was negative for DVT.  She was evaluated by 

Cardiology and no intervention planned at this point and chest pain was thought 

to be related to hypertensive urgency.  She was started on amlodipine with 

slight improvement in blood pressure however blood pressure still not at goal.  

Her chest pain seems to have resolved.  Office blood pressure record review 

showed well controlled blood pressure at baseline.





Renal function has been add baseline although continues to be variable, 

creatinine was 1.3 this morning.  She remain non-oliguric.  Electrolyte has 

been acceptable.





Past Medical/Surgical History


Medical:


 





# Stage 3 CKD with history of recurrent episodes of acute kidney injury, 

baseline creatinine variable from 1.5-2.0


# Coronary artery disease with a history of non-STEMI status post cath in 2002 

PCI to


the RCA with residual 60% stenosis of the LAD and 50% stenosis of a large


diagonal. EF 55% with diastolic dysfunction.


# Hypertension, poorly-controlled


# diabetes type 2


# obesity


# history of gout.








Allergies


Coded Allergies:  


     Sulfamethoxazole w/Trimethoprim (Verified  Allergy, Unknown, RASH, 1/26/18)


     Codeine (Verified  Adverse Reaction, Mild, nausea, 1/26/18)





Inpatient Medications





Current Inpatient Medications








 Medications


  (Trade)  Dose


 Ordered  Sig/Will


 Route  Start Time


 Stop Time Status Last Admin


Dose Admin


 


 Heparin Sodium


  (Porcine)


  (Heparin Sq 5000


 Unit/0.5ml)  5,000 unit  Q8


 SQ  1/26/18 22:00


 2/25/18 21:59  1/27/18 05:43


5,000 UNIT


 


 Acetaminophen


  (Tylenol Tab)  650 mg  Q4H  PRN


 PO  1/26/18 14:00


 2/25/18 13:59  1/27/18 05:36


650 MG


 


 Al Hydrox/Mg


 Hydrox/Simethicone


  (Maalox Max Susp)  15 ml  Q4H  PRN


 PO  1/26/18 14:00


 2/25/18 13:59   


 


 


 Magnesium


 Hydroxide


  (Milk Of


 Magnesia Susp)  30 ml  Q12H  PRN


 PO  1/26/18 14:00


 2/25/18 13:59   


 


 


 Ondansetron HCl


  (Zofran Inj)  4 mg  Q6H  PRN


 IV  1/26/18 14:00


 2/25/18 13:59   


 


 


 Nitroglycerin


  (Nitrostat Tab)  0.4 mg  UD  PRN


 SL  1/26/18 14:00


 2/25/18 13:59   


 


 


 Aspirin


  (Ecotrin Tab)  81 mg  QAM


 PO  1/27/18 09:00


 2/26/18 08:59  1/27/18 07:58


81 MG


 


 Polyethylene


  (Miralax Powder


 Packet)  17 gm  DAILY  PRN


 PO  1/26/18 14:00


 2/25/18 13:59   


 


 


 Allopurinol


  (Zyloprim Tab)  300 mg  QAM


 PO  1/27/18 09:00


 2/26/18 08:59  1/27/18 07:58


300 MG


 


 Atorvastatin


 Calcium


  (Lipitor Tab)  80 mg  QAM


 PO  1/27/18 09:00


 2/26/18 08:59  1/27/18 07:58


80 MG


 


 Bumetanide


  (Bumex Tab)  1 mg  BID17


 PO  1/26/18 17:00


 2/25/18 16:59  1/27/18 07:58


1 MG


 


 Gabapentin


  (Neurontin Tab)  600 mg  QAM


 PO  1/27/18 09:00


 2/26/18 08:59  1/27/18 07:58


600 MG


 


 Gabapentin


  (Neurontin Cap)  900 mg  HS


 PO  1/26/18 21:00


 2/25/18 20:59  1/26/18 20:25


900 MG


 


 Insulin Glargine


  (Lantus Vial)  30 units  QPM


 SC  1/26/18 21:00


 2/25/18 20:59  1/26/18 20:31


30 UNITS


 


 Insulin Glargine


  (Lantus Vial)  50 units  QAM


 SQ  1/27/18 09:00


 2/26/18 08:59  1/27/18 08:06


50 UNITS


 


 Lorazepam


  (Ativan Tab)  2 mg  HS  PRN


 PO  1/26/18 14:30


 2/25/18 14:29   


 


 


 Nitroglycerin


  (Nitroglycerin


 2% Oint)  2 inch  Q6


 EXT  1/26/18 18:00


 2/25/18 17:59  1/27/18 05:36


2 INCH


 


 Insulin Aspart


  (novoLOG ASPART)  **SLIDING


 SCALE**


 **If C...  ACHS


 SC  1/26/18 16:15


 2/25/18 16:14  1/27/18 08:05


5 UNITS


 


 Glucose


  (Glucose 40% Gel)  15-30


 GRAMS 15


 GRAMS...  UD  PRN


 PO  1/26/18 14:30


 2/25/18 14:29   


 


 


 Glucose


  (Glucose Chew


 Tab)  4-8


 Tablets 4


 Tabl...  UD  PRN


 PO  1/26/18 14:30


 2/25/18 14:29   


 


 


 Dextrose


  (Dextrose 50%


 50ML Syringe)  25-50ML OF


 50% DW IV


 FOR...  UD  PRN


 IV  1/26/18 14:30


 2/25/18 14:29   


 


 


 Glucagon


  (Glucagon Inj)  1 mg  UD  PRN


 SQ  1/26/18 14:30


 2/25/18 14:29   


 


 


 Miscellaneous


  (Iv Fluids


 Completed)  1 ea  PRN  PRN


 N/A  1/26/18 14:45


 1/26/19 14:44   


 


 


 Vancomycin HCl


 1750 mg/Sodium


 Chloride  535 ml @ 


 200 mls/hr  Q24H


 IV  1/27/18 21:00


 2/4/18 23:41   


 


 


 Miscellaneous


 Information


  (Consult)  1 ea  UD  PRN


 N/A  1/26/18 21:00


 2/25/18 20:59   


 


 


 Amlodipine


 Besylate


  (Norvasc Tab)  10 mg  QAM


 PO  1/27/18 10:45


 2/26/18 10:44 UNV  


 


 


 Potassium Chloride


  (Klor-Con Tab)  20 meq  BID


 PO  1/27/18 21:00


 2/25/18 20:59   


 


 


 Lisinopril


  (Zestril Tab)  5 mg  QAM


 PO  1/28/18 09:00


 2/27/18 08:59   


 











Family History





Diabetes mellitus


FH: CAD (coronary artery disease)


Hypertension


Stroke





Social History


Smoking Status:  Never Smoker


Smokeless Tobacco Use:  No


Alcohol Use:  none


Drug Use:  none


Marital Status:  


Housing Status:  lives alone


Occupation:  retired





Review of Systems


A complete review of systems was performed.  Pertinent positives are noted 

above. All other systems are negative.





Physical Exam











  Date Time  Temp Pulse Resp B/P (MAP) Pulse Ox O2 Delivery O2 Flow Rate FiO2


 


1/27/18 08:00      Room Air  


 


1/27/18 07:53 36.6 73 19 172/80 (110) 96 Room Air  


 


1/27/18 04:00      Room Air  


 


1/27/18 03:59 36.5 64 20 137/67 (90) 99 Room Air  


 


1/27/18 00:01      Room Air  


 


1/26/18 23:35 36.6 72 18 162/71 (101) 96 Room Air  


 


1/26/18 20:05      Room Air  


 


1/26/18 19:36 36.6 62 22 147/78 (101) 93 Room Air  


 


1/26/18 16:00     97 Room Air  


 


1/26/18 15:26 36.8 66 20 170/82 (111) 97 Room Air  


 


1/26/18 14:53  65 15 170/50 94   


 


1/26/18 14:00     93 Room Air  


 


1/26/18 13:23  67 18 199/75 93 Room Air  


 


1/26/18 12:28  68 18 179/100 95 Room Air  


 


1/26/18 12:26     95 Room Air  


 


1/26/18 12:24     95 Room Air  


 


1/26/18 12:18 36.9 82 15 174/97 95 Room Air  


 


1/26/18 12:15  67      


 


1/26/18 11:53     95 Room Air  











GENERAL:  Elderly female,  AAA x 3,  pleasant, healthy-appearing, not in any 

distress.


HEENT: Atraumatic, normocephalic.


NECK: Supple, no JVD, no carotid bruit appreciated. 


ENT: No sinus tenderness


MOUTH and THROAT: Moist oral mucosa, no oral ulcer or pharyngeal erythema


RESPIRATORY: Normal breathing efforts, no accessory muscle use, clear to 

auscultation bilaterally, no wheezes or rales.


CARDIOVASCULAR: S1, S2 normal, rate rhythm regular.


ABDOMEN: Soft, nontender, positive bowel sound.


MUSCULOSKELETAL: No CVA tenderness. No joint swelling, erythema or tenderness. 

Normal range of motion.


SKIN: No skin rash


EXTREMITY: No lower extremity edema


NEURO: No gross focal neurological deficit, speech fluent.


PSYCHIATRY: Normal mood and judgment





Laboratory Results





Last 24 Hours








Test


  1/26/18


12:00 1/26/18


12:54 1/26/18


16:28 1/26/18


19:28


 


White Blood Count 5.77 K/uL    


 


Red Blood Count 4.50 M/uL    


 


Hemoglobin 14.3 g/dL    


 


Hematocrit 43.1 %    


 


Mean Corpuscular Volume 95.8 fL    


 


Mean Corpuscular Hemoglobin 31.8 pg    


 


Mean Corpuscular Hemoglobin


Concent 33.2 g/dl 


  


  


  


 


 


RDW Standard Deviation 50.1 fL    


 


RDW Coefficient of Variation 14.4 %    


 


Platelet Count 167 K/uL    


 


Mean Platelet Volume 11.8 fL    


 


Prothrombin Time 10.4 SECONDS    


 


Prothromb Time International


Ratio 1.0 


  


  


  


 


 


Activated Partial


Thromboplast Time 25.8 SECONDS 


  


  


  


 


 


Partial Thromboplastin Ratio 1.0    


 


D-Dimer 1120 ug/L FEU    


 


Sodium Level 137 mmol/L    


 


Potassium Level 3.5 mmol/L    


 


Chloride Level 100 mmol/L    


 


Carbon Dioxide Level 33 mmol/L    


 


Anion Gap 4.0 mmol/L    


 


Blood Urea Nitrogen 36 mg/dl    


 


Creatinine 1.31 mg/dl    


 


Est Creatinine Clear Calc


Drug Dose 43.1 ml/min 


  


  


  


 


 


Estimated GFR (


American) 46.0 


  


  


  


 


 


Estimated GFR (Non-


American 39.7 


  


  


  


 


 


BUN/Creatinine Ratio 27.6    


 


Random Glucose 115 mg/dl    


 


Calcium Level 10.0 mg/dl    


 


Magnesium Level 1.9 mg/dl    


 


Total Bilirubin 0.5 mg/dl    


 


Aspartate Amino Transf


(AST/SGOT) 30 U/L 


  


  


  


 


 


Alanine Aminotransferase


(ALT/SGPT) 35 U/L 


  


  


  


 


 


Alkaline Phosphatase 84 U/L    


 


Total Creatine Kinase 312 U/L    


 


Creatine Kinase MB 5.3 ng/ml    


 


Creatine Kinase MB Ratio 1.7    


 


Troponin I 0.017 ng/ml    0.019 ng/ml 


 


Total Protein 8.0 gm/dl    


 


Albumin 3.6 gm/dl    


 


Globulin 4.4 gm/dl    


 


Albumin/Globulin Ratio 0.8    


 


Bedside Troponin I  < 0.030 ng/ml   


 


Bedside Glucose   101 mg/dl  


 


Test


  1/26/18


20:24 1/27/18


01:40 1/27/18


06:37 1/27/18


07:34


 


Bedside Glucose 116 mg/dl   104 mg/dl  


 


Troponin I  0.016 ng/ml   0.016 ng/ml 


 


White Blood Count    5.98 K/uL 


 


Red Blood Count    4.17 M/uL 


 


Hemoglobin    13.2 g/dL 


 


Hematocrit    40.6 % 


 


Mean Corpuscular Volume    97.4 fL 


 


Mean Corpuscular Hemoglobin    31.7 pg 


 


Mean Corpuscular Hemoglobin


Concent 


  


  


  32.5 g/dl 


 


 


RDW Standard Deviation    51.3 fL 


 


RDW Coefficient of Variation    14.5 % 


 


Platelet Count    148 K/uL 


 


Mean Platelet Volume    11.1 fL 


 


Sodium Level    141 mmol/L 


 


Potassium Level    3.6 mmol/L 


 


Chloride Level    102 mmol/L 


 


Carbon Dioxide Level    33 mmol/L 


 


Anion Gap    7.0 mmol/L 


 


Blood Urea Nitrogen    32 mg/dl 


 


Creatinine    1.21 mg/dl 


 


Est Creatinine Clear Calc


Drug Dose 


  


  


  46.3 ml/min 


 


 


Estimated GFR (


American) 


  


  


  50.7 


 


 


Estimated GFR (Non-


American 


  


  


  43.7 


 


 


BUN/Creatinine Ratio    26.3 


 


Random Glucose    86 mg/dl 


 


Estimated Average Glucose    148 mg/dl 


 


Hemoglobin A1c    6.8 % 


 


Calcium Level    10.0 mg/dl 


 


Triglycerides Level    137 mg/dl 


 


Cholesterol Level    105 mg/dl 


 


HDL Cholesterol    50 mg/dl 


 


LDL Cholesterol, Calculated    28 mg/dl 


 


VLDL Cholesterol, Calculated    27 mg/dl 


 


Cholesterol/HDL Ratio    2.1 











Clifton Mantilla is a 75-year-old female with obesity, diabetes mellitus, coronary 

artery disease and CKD IV, admitted to the hospital 2 days ago with chest pain 

and hypertensive urgency.  On admission systolic blood pressure was 1 80s to 

190s.  EKG was unremarkable, cardiac enzymes are normal.  D-dimer was elevated 

but Doppler of lower extremity was negative for DVT.  She was evaluated by 

Cardiology and no intervention planned at this point and chest pain was thought 

to be related to hypertensive urgency.  She was started on amlodipine with 

slight improvement in blood pressure however blood pressure still not at goal.  

Her chest pain seems to have resolved. She has chronic lower extremity 

lymphedema requiring large doses of diuretics to maintain euvolemia.





Recommendations


--BP started to the improve, historically her blood pressure usually well 

controlled, expect blood pressure to continue to improve


--continue on amlodipine 10 mg


--currently she is above her dry weight, would continue on Bumetanide 1 mg 

twice a day, in for slightly net negative, she has been negative since admission


--if blood pressure not at goal, suggest increasing lisinopril to 20 mg p.o. 

daily and titrate dose up as tolerated


--renal function seems to be at baseline, electrolyte acceptable


--may need to decrease potassium supplement further to avoid risk of 

hyperkalemia when lisinopril dose increased





Will follow





Thank you for allowing me to participate in your patient's care. It was a 

pleasure to see Radha











--

## 2018-01-27 NOTE — PROGRESS NOTE
Medicine Progress Note


Date & Time of Visit:


Jan 27, 2018 at 18:25.


Subjective


Patient is doing well, she states she feels great and hopes she will be able to 

go home soon. No complaints of CP or SOB. BP has been better controlled today. 

RLE wound erythema has receded and she denies any other complaints. No 

overnight events noted.





Objective





Last 8 Hrs








  Date Time  Temp Pulse Resp B/P (MAP) Pulse Ox O2 Delivery O2 Flow Rate FiO2


 


1/27/18 16:00      Room Air  


 


1/27/18 15:00 36.8 62 18 136/79 (98) 96 Room Air  


 


1/27/18 12:00      Room Air  


 


1/27/18 11:39 36.7 63 18 146/74 (98) 98 Room Air  








Physical Exam:


GENERAL: Patient is in no acute distress.


HEENT: No acute trauma, normocephalic, mucous membranes moist, no nasal 

congestion, no scleral icterus, conjunctivae clear.


NECK: No stridor, trachea is midline.


LUNGS: Clear to auscultation bilaterally, no wheeze, no rhonchi, breath sounds 

equal.


HEART: Without murmurs gallops or rubs, regular rate and rhythm.


ABDOMEN: Soft, nontender, bowel sounds positive, obese


EXTREMITIES: No cyanosis; trace LE edema, full range of motion of all the 

joints without pain or difficulty, no signs for acute trauma.


NEUROLOGIC: Oriented x 3, no acute motor or sensory deficits, no focal weakness.


SKIN: No rash, no jaundice, no diaphoresis. RLE wound on shin with no 

surrounding erythema or pain, no drainage noted


Laboratory Results:





Last 24 Hours








Test


  1/26/18


19:28 1/26/18


20:24 1/27/18


01:40 1/27/18


06:37


 


Troponin I 0.019 ng/ml   0.016 ng/ml  


 


Bedside Glucose  116 mg/dl   104 mg/dl 


 


Test


  1/27/18


07:34 1/27/18


11:06 1/27/18


13:34 1/27/18


16:23


 


White Blood Count 5.98 K/uL    


 


Red Blood Count 4.17 M/uL    


 


Hemoglobin 13.2 g/dL    


 


Hematocrit 40.6 %    


 


Mean Corpuscular Volume 97.4 fL    


 


Mean Corpuscular Hemoglobin 31.7 pg    


 


Mean Corpuscular Hemoglobin


Concent 32.5 g/dl 


  


  


  


 


 


RDW Standard Deviation 51.3 fL    


 


RDW Coefficient of Variation 14.5 %    


 


Platelet Count 148 K/uL    


 


Mean Platelet Volume 11.1 fL    


 


Sodium Level 141 mmol/L    


 


Potassium Level 3.6 mmol/L    


 


Chloride Level 102 mmol/L    


 


Carbon Dioxide Level 33 mmol/L    


 


Anion Gap 7.0 mmol/L    


 


Blood Urea Nitrogen 32 mg/dl    


 


Creatinine 1.21 mg/dl    


 


Est Creatinine Clear Calc


Drug Dose 46.3 ml/min 


  


  


  


 


 


Estimated GFR (


American) 50.7 


  


  


  


 


 


Estimated GFR (Non-


American 43.7 


  


  


  


 


 


BUN/Creatinine Ratio 26.3    


 


Random Glucose 86 mg/dl    


 


Estimated Average Glucose 148 mg/dl    


 


Hemoglobin A1c 6.8 %    


 


Calcium Level 10.0 mg/dl    


 


Troponin I 0.016 ng/ml   < 0.015 ng/ml  


 


Triglycerides Level 137 mg/dl    


 


Cholesterol Level 105 mg/dl    


 


HDL Cholesterol 50 mg/dl    


 


LDL Cholesterol, Calculated 28 mg/dl    


 


VLDL Cholesterol, Calculated 27 mg/dl    


 


Cholesterol/HDL Ratio 2.1    


 


Bedside Glucose  159 mg/dl   178 mg/dl 











Assessment & Plan


CHEST PAIN/ANGINA:


-likely secondary to HTN urgency


-serial CM negative


-on presentation patient had mid sternal chest heaviness x 2 days with left 

scapular discomfort. Took 1 SL nitro with decrease chest and back discomfort. 

EMS gave 1 SL nitro and 324mg ASA and pt was pain free; in the ER nitro paste 

was placed and pt continued to be pain free. 


-EKG: NSR, no ST elevations noted. Negative troponin


-CXR: cardiomegaly, no acute changes. 


-Cardiology consulted, appreciate recommendations


-TTE Report: *  -- Conclusions --


  *  No significant change compared to previous study of 7/21/17.


  *  Normal LV chamber size with mild concentric LVH, sigmoid appearing septum.


  *  Normal LV systolic function, EF 60-65%.


  *  No segmental left ventricular wall motion abnormalities are noted.


  *  Grade I diastolic dysfunction.


  *  Aortic valve sclerosis mild, without significant aortic valvular stenosis. 

Mild aortic regurgitation.


  *  Severe MAC with mild mitral regurgitation.


  *  Moderate left atrial enlargement.


-continue statin


-ASA 


-Nitro paste stopped


-continue bumex





HTN URGENCY:


-stopped nitro paste


-on amlodipine and lisinopril





DM TYPE II: controlled


-HbA1C: 6.8


-continue Lantus + NovoLog correction scale per protocol





CKD STAGE IV:


-baseline Cr 1.4-->today is 1.2


-continue to monitor


-avoid nephrotoxic agents when possible 


-Nephrology consulted, appreciate recs





WOUND R LEG:


-wound nurse consult


-on empiric vanco, changed to cefazolin for MSSA





GOUT:


-no evidence of exacerbation


-continue allopurinol


Current Inpatient Medications:





Current Inpatient Medications








 Medications


  (Trade)  Dose


 Ordered  Sig/Will


 Route  Start Time


 Stop Time Status Last Admin


Dose Admin


 


 Heparin Sodium


  (Porcine)


  (Heparin Sq 5000


 Unit/0.5ml)  5,000 unit  Q8


 SQ  1/26/18 22:00


 2/25/18 21:59  1/27/18 16:12


5,000 UNIT


 


 Acetaminophen


  (Tylenol Tab)  650 mg  Q4H  PRN


 PO  1/26/18 14:00


 2/25/18 13:59  1/27/18 05:36


650 MG


 


 Al Hydrox/Mg


 Hydrox/Simethicone


  (Maalox Max Susp)  15 ml  Q4H  PRN


 PO  1/26/18 14:00


 2/25/18 13:59   


 


 


 Magnesium


 Hydroxide


  (Milk Of


 Magnesia Susp)  30 ml  Q12H  PRN


 PO  1/26/18 14:00


 2/25/18 13:59   


 


 


 Ondansetron HCl


  (Zofran Inj)  4 mg  Q6H  PRN


 IV  1/26/18 14:00


 2/25/18 13:59   


 


 


 Nitroglycerin


  (Nitrostat Tab)  0.4 mg  UD  PRN


 SL  1/26/18 14:00


 2/25/18 13:59   


 


 


 Aspirin


  (Ecotrin Tab)  81 mg  QAM


 PO  1/27/18 09:00


 2/26/18 08:59  1/27/18 07:58


81 MG


 


 Polyethylene


  (Miralax Powder


 Packet)  17 gm  DAILY  PRN


 PO  1/26/18 14:00


 2/25/18 13:59   


 


 


 Allopurinol


  (Zyloprim Tab)  300 mg  QAM


 PO  1/27/18 09:00


 2/26/18 08:59  1/27/18 07:58


300 MG


 


 Atorvastatin


 Calcium


  (Lipitor Tab)  80 mg  QAM


 PO  1/27/18 09:00


 2/26/18 08:59  1/27/18 07:58


80 MG


 


 Bumetanide


  (Bumex Tab)  1 mg  BID17


 PO  1/26/18 17:00


 2/25/18 16:59  1/27/18 16:14


1 MG


 


 Gabapentin


  (Neurontin Tab)  600 mg  QAM


 PO  1/27/18 09:00


 2/26/18 08:59  1/27/18 07:58


600 MG


 


 Gabapentin


  (Neurontin Cap)  900 mg  HS


 PO  1/26/18 21:00


 2/25/18 20:59  1/26/18 20:25


900 MG


 


 Insulin Glargine


  (Lantus Vial)  30 units  QPM


 SC  1/26/18 21:00


 2/25/18 20:59  1/26/18 20:31


30 UNITS


 


 Insulin Glargine


  (Lantus Vial)  50 units  QAM


 SQ  1/27/18 09:00


 2/26/18 08:59  1/27/18 08:06


50 UNITS


 


 Lorazepam


  (Ativan Tab)  2 mg  HS  PRN


 PO  1/26/18 14:30


 2/25/18 14:29   


 


 


 Insulin Aspart


  (novoLOG ASPART)  **SLIDING


 SCALE**


 **If C...  ACHS


 SC  1/26/18 16:15


 2/25/18 16:14  1/27/18 17:17


8 UNITS


 


 Glucose


  (Glucose 40% Gel)  15-30


 GRAMS 15


 GRAMS...  UD  PRN


 PO  1/26/18 14:30


 2/25/18 14:29   


 


 


 Glucose


  (Glucose Chew


 Tab)  4-8


 Tablets 4


 Tabl...  UD  PRN


 PO  1/26/18 14:30


 2/25/18 14:29   


 


 


 Dextrose


  (Dextrose 50%


 50ML Syringe)  25-50ML OF


 50% DW IV


 FOR...  UD  PRN


 IV  1/26/18 14:30


 2/25/18 14:29   


 


 


 Glucagon


  (Glucagon Inj)  1 mg  UD  PRN


 SQ  1/26/18 14:30


 2/25/18 14:29   


 


 


 Miscellaneous


  (Iv Fluids


 Completed)  1 ea  PRN  PRN


 N/A  1/26/18 14:45


 1/26/19 14:44   


 


 


 Amlodipine


 Besylate


  (Norvasc Tab)  10 mg  QAM


 PO  1/27/18 10:45


 2/26/18 10:44  1/27/18 11:21


10 MG


 


 Potassium Chloride


  (Klor-Con Tab)  20 meq  BID


 PO  1/27/18 21:00


 2/25/18 20:59   


 


 


 Lisinopril


  (Zestril Tab)  5 mg  QAM


 PO  1/28/18 09:00


 2/27/18 08:59   


 


 


 Nystatin


  (Mycostatin


 Powder)  1 appln  PRN  PRN


 EXT  1/27/18 17:00


 2/26/18 16:59   


 


 


 Cefazolin Sodium


 2000 mg/Syringe  10 ml @ 


 2.5 mls/min  Q8H


 IV  1/27/18 18:00


 2/6/18 17:59  1/27/18 17:57


2.5 MLS/MIN

## 2018-01-28 NOTE — DISCHARGE INSTRUCTIONS
Discharge Instructions


Date of Service


Jan 28, 2018.





Admission


Reason for Admission:  Angina Pectoris, Chest Pain





Discharge


Discharge Diagnosis / Problem:  Chest pain, hypertensive urgency





Discharge Goals


Goal(s):  Therapeutic intervention





Activity Recommendations


Activity Limitations:  resume your previous activity


Please avoid rapid changes in position. Allow yourself time to adapt when 

moving from laying to sitting position, or sitting to standing position. Take 

frequent breaks and rest when needed. Break activities into smaller tasks when 

able.


.





Instructions / Follow-Up


Instructions / Follow-Up


Please see Dr. Marcos on January 30th, Tuesday at 11:45 AM for hospital 

follow up





Current Hospital Diet


Patient's current hospital diet: AHA Diet (Heart Healthy), Diabetes Type 2 Diet





Discharge Diet


Recommended Diet:  AHA Diet (Heart Healthy), Diabetes Type 2 Diet





Pending Studies


Studies pending at discharge:  no





Laboratory Results





Hemoglobin A1c








Test


  1/27/18


07:34 Range/Units


 


 


Estimated Average Glucose 148   mg/dl


 


Hemoglobin A1c 6.8 H 4.5-5.6  %








Lipid Panel








Test


  1/27/18


07:34 Range/Units


 


 


Triglycerides Level 137  0-150  mg/dl


 


Cholesterol Level 105  0-200  mg/dl


 


HDL Cholesterol 50   mg/dl


 


Cholesterol/HDL Ratio 2.1   


 


LDL Cholesterol, Calculated 28   mg/dl











Medical Emergencies








.


Who to Call and When:





Medical Emergencies:  If at any time you feel your situation is an emergency, 

please call 911 immediately.





.





Non-Emergent Contact


Non-Emergency issues call your:  Primary Care Provider





.


.








"Provider Documentation" section prepared by Chrissy Newell.








.





VTE Core Measure


Inpt VTE Proph given/why not?:  Unfractionated heparin SQ

## 2018-01-28 NOTE — PROGRESS NOTE
DATE: 01/28/2018

 

FOLLOWUP VISIT

 

SUBJECTIVE:  The patient is a 75-year-old female who was admitted with

hypertensive urgency.  Yesterday, I started lisinopril.  Her potassium dosage

was decreased after starting this medication.  Her blood pressure has

improved with a combination of lisinopril and amlodipine.  She has no current

complaints and would like to be discharged home.

 

OBJECTIVE:

GENERAL:  She is alert and oriented, comfortably sitting in a chair.

VITAL SIGNS:  Blood pressure is 115/70, pulse is regular at 65.  She is

afebrile.

HEENT:  She is normocephalic.  Pupils are equal and reactive to light. 

Extraocular muscles are intact bilaterally.

NECK:  The neck veins are flat.  Carotids have good upstrokes bilaterally

without bruits.  Thyroid is nonpalpable.

RESPIRATORY:  Breath sounds equal bilaterally and clear to auscultation.

CARDIOVASCULAR:  Heart has a regular rhythm.  Normal S1, S2.  No S3, S4.  No

cardiac rubs or murmurs.

GASTROINTESTINAL:  Abdomen is soft, nontender without organomegaly.

EXTREMITIES:  Free of edema, digit clubbing, or cyanosis.

NEUROLOGIC:  Grossly intact.

SKIN:  Warm to touch.

LYMPH NODES:  Negative to palpation.

 

LABORATORY DATA:  Potassium is 3.9, creatinine is 1.5.

 

IMPRESSION:

1.  Hypertensive urgency.

2.  Chronic renal insufficiency.

3.  Stable coronary artery disease.

 

RECOMMENDATIONS:  Currently, I believe the patient is stable.  I believe the

patient may be discharged with an outpatient followup.  She should have a

followup appointment with her nephrologist.

## 2018-01-28 NOTE — NEPHROLOGY PROGRESS NOTE
Nephrology Progress Note


Date of Service


Jan 28, 2018.





Chief Complaint


Follow-up for chronic kidney disease and hypertensive urgency.





Regan Garcia was seen and examined in her room this morning.  She has been otherwise 

feeling fine denies any headache, shortness of breath or chest pain.  Denies 

any overnight events.  Has been voiding normally.  Blood pressure improve 

significantly and has been stable within goal.  Renal function stable 

creatinine 1.5, electrolyte acceptable.





Review of Systems


A complete review of systems was performed.  Pertinent positives are noted 

above. All other systems are negative.





Vital Signs





Last 8 Hrs








  Date Time  Temp Pulse Resp B/P (MAP) Pulse Ox O2 Delivery O2 Flow Rate FiO2


 


1/28/18 08:19 36.4 77 20 142/77 (98) 96 Room Air  


 


1/28/18 04:00      Room Air  


 


1/28/18 03:14 37.0 72 19 115/64 (81) 95 Room Air  











Last Recorded Weight


Weight (Kilograms):  107.200





Physical Exam


GENERAL:  Elderly female, AAA x 3,  pleasant, healthy-appearing, not in any 

distress.


NECK: Supple, no JVD.


RESPIRATORY: Normal breathing efforts, no accessory muscle use, clear to 

auscultation bilaterally, no wheezes or rales.


CARDIOVASCULAR: S1, S2 normal, rate rhythm regular.


EXTREMITY: trace lower extremity edema, right leg superficial ulcer.


NEURO: speech fluent.


PSYCHIATRY: Normal mood and judgment





Family History





Diabetes mellitus


FH: CAD (coronary artery disease)


Hypertension


Stroke





Social History


Smoking Status:  Never smoker


Smokeless Tobacco Use:  No


Alcohol Use:  none


Drug Use:  none


Marital Status:  


Housing Status:  lives alone


Occupation:  retired





Laboratory Results


Past 24 Hours


1/28/18 06:57

















Test


  1/27/18


11:06 1/27/18


13:34 1/27/18


16:23 1/27/18


20:28


 


Bedside Glucose


  159 mg/dl


(70-90) 


  178 mg/dl


(70-90) 146 mg/dl


(70-90)


 


Troponin I


  


  < 0.015 ng/ml


(0-0.045) 


  


 


 


Test


  1/28/18


06:41 1/28/18


06:57 


  


 


 


Bedside Glucose


  93 mg/dl


(70-90) 


  


  


 


 


Anion Gap


  


  4.0 mmol/L


(3-11) 


  


 


 


Est Creatinine Clear Calc


Drug Dose 


  37.3 ml/min 


  


  


 


 


Estimated GFR (


American) 


  39.1 


  


  


 


 


Estimated GFR (Non-


American 


  33.7 


  


  


 


 


BUN/Creatinine Ratio  26.9 (10-20)   


 


Calcium Level


  


  9.6 mg/dl


(8.5-10.1) 


  


 


 


Phosphorus Level


  


  3.3 mg/dl


(2.5-4.9) 


  


 


 


Albumin


  


  2.9 gm/dl


(3.4-5.0) 


  


 











Allergies


Coded Allergies:  


     Sulfamethoxazole w/Trimethoprim (Verified  Allergy, Unknown, RASH, 1/26/18)


     Codeine (Verified  Adverse Reaction, Mild, nausea, 1/26/18)





Medications





Current Inpatient Medications








 Medications


  (Trade)  Dose


 Ordered  Sig/Will


 Route  Start Time


 Stop Time Status Last Admin


Dose Admin


 


 Heparin Sodium


  (Porcine)


  (Heparin Sq 5000


 Unit/0.5ml)  5,000 unit  Q8


 SQ  1/26/18 22:00


 2/25/18 21:59  1/28/18 06:43


5,000 UNIT


 


 Acetaminophen


  (Tylenol Tab)  650 mg  Q4H  PRN


 PO  1/26/18 14:00


 2/25/18 13:59  1/27/18 05:36


650 MG


 


 Al Hydrox/Mg


 Hydrox/Simethicone


  (Maalox Max Susp)  15 ml  Q4H  PRN


 PO  1/26/18 14:00


 2/25/18 13:59   


 


 


 Magnesium


 Hydroxide


  (Milk Of


 Magnesia Susp)  30 ml  Q12H  PRN


 PO  1/26/18 14:00


 2/25/18 13:59   


 


 


 Ondansetron HCl


  (Zofran Inj)  4 mg  Q6H  PRN


 IV  1/26/18 14:00


 2/25/18 13:59   


 


 


 Nitroglycerin


  (Nitrostat Tab)  0.4 mg  UD  PRN


 SL  1/26/18 14:00


 2/25/18 13:59   


 


 


 Aspirin


  (Ecotrin Tab)  81 mg  QAM


 PO  1/27/18 09:00


 2/26/18 08:59  1/28/18 08:19


81 MG


 


 Polyethylene


  (Miralax Powder


 Packet)  17 gm  DAILY  PRN


 PO  1/26/18 14:00


 2/25/18 13:59   


 


 


 Allopurinol


  (Zyloprim Tab)  300 mg  QAM


 PO  1/27/18 09:00


 2/26/18 08:59  1/28/18 08:19


300 MG


 


 Atorvastatin


 Calcium


  (Lipitor Tab)  80 mg  QAM


 PO  1/27/18 09:00


 2/26/18 08:59  1/28/18 08:19


80 MG


 


 Bumetanide


  (Bumex Tab)  1 mg  BID17


 PO  1/26/18 17:00


 2/25/18 16:59  1/28/18 08:20


1 MG


 


 Gabapentin


  (Neurontin Tab)  600 mg  QAM


 PO  1/27/18 09:00


 2/26/18 08:59  1/28/18 08:20


600 MG


 


 Gabapentin


  (Neurontin Cap)  900 mg  HS


 PO  1/26/18 21:00


 2/25/18 20:59  1/27/18 21:41


900 MG


 


 Insulin Glargine


  (Lantus Vial)  30 units  QPM


 SC  1/26/18 21:00


 2/25/18 20:59  1/27/18 21:41


30 UNITS


 


 Insulin Glargine


  (Lantus Vial)  50 units  QAM


 SQ  1/27/18 09:00


 2/26/18 08:59  1/28/18 08:26


50 UNITS


 


 Lorazepam


  (Ativan Tab)  2 mg  HS  PRN


 PO  1/26/18 14:30


 2/25/18 14:29  1/27/18 23:22


2 MG


 


 Insulin Aspart


  (novoLOG ASPART)  **SLIDING


 SCALE**


 **If C...  ACHS


 SC  1/26/18 16:15


 2/25/18 16:14  1/28/18 08:25


4 UNITS


 


 Glucose


  (Glucose 40% Gel)  15-30


 GRAMS 15


 GRAMS...  UD  PRN


 PO  1/26/18 14:30


 2/25/18 14:29   


 


 


 Glucose


  (Glucose Chew


 Tab)  4-8


 Tablets 4


 Tabl...  UD  PRN


 PO  1/26/18 14:30


 2/25/18 14:29   


 


 


 Dextrose


  (Dextrose 50%


 50ML Syringe)  25-50ML OF


 50% DW IV


 FOR...  UD  PRN


 IV  1/26/18 14:30


 2/25/18 14:29   


 


 


 Glucagon


  (Glucagon Inj)  1 mg  UD  PRN


 SQ  1/26/18 14:30


 2/25/18 14:29   


 


 


 Miscellaneous


  (Iv Fluids


 Completed)  1 ea  PRN  PRN


 N/A  1/26/18 14:45


 1/26/19 14:44   


 


 


 Amlodipine


 Besylate


  (Norvasc Tab)  10 mg  QAM


 PO  1/27/18 10:45


 2/26/18 10:44  1/28/18 08:20


10 MG


 


 Potassium Chloride


  (Klor-Con Tab)  20 meq  BID


 PO  1/27/18 21:00


 2/25/18 20:59  1/28/18 08:20


20 MEQ


 


 Lisinopril


  (Zestril Tab)  5 mg  QAM


 PO  1/28/18 09:00


 2/27/18 08:59  1/28/18 08:20


5 MG


 


 Nystatin


  (Mycostatin


 Powder)  1 appln  PRN  PRN


 EXT  1/27/18 17:00


 2/26/18 16:59   


 


 


 Cefazolin Sodium


 2000 mg/Syringe  10 ml @ 


 2.5 mls/min  Q8H


 IV  1/27/18 18:00


 2/6/18 17:59  1/28/18 09:13


2.5 MLS/MIN











Impression


Radha Mantilla is a 75-year-old female with obesity, diabetes mellitus, coronary 

artery disease and CKD IV, admitted to the hospital 2 days ago with chest pain 

and hypertensive urgency.  On admission systolic blood pressure was 1 80s to 

190s.  EKG was unremarkable, cardiac enzymes are normal.  D-dimer was elevated 

but Doppler of lower extremity was negative for DVT.  She was evaluated by 

Cardiology and no intervention planned at this point and chest pain was thought 

to be related to hypertensive urgency.  She was started on amlodipine with 

slight improvement in blood pressure however blood pressure still not at goal.  

Her chest pain seems to have resolved. She has chronic lower extremity 

lymphedema requiring large doses of diuretics to maintain euvolemia.





Recommendations


--BP improved remain, remained stable, renal function stable at baseline with 

is acceptable electrolyte


--continue on amlodipine 10 mg and lisinopril 5 milligrams p.o. daily


--continue on Bumetanide 1 mg twice a day


--please schedule for outpatient Nephrology follow-up with Dr. Moreno  in 1-2 

weeks, renal panel prior to the visit





Will sign off,  okay to be discharged from nephrology standpoint

















--

## 2018-01-28 NOTE — PROGRESS NOTE
DATE: 01/27/2018

 

FOLLOWUP VISIT

 

SUBJECTIVE:  The patient is a 75-year-old female with a history of ischemic

heart disease and prior coronary intervention in 2002.  She also has a

history of diabetes with chronic renal insufficiency and diastolic

dysfunction.  The patient presented with upper back discomfort and chest

pain, which were felt to be due to hypertensive urgency.  There has been

reluctance in the part of the medicine people to use certain antihypertensive

medications due to the patient's history of renal insufficiency, bradycardia

and abnormalities with the patient's potassium.  Her only complaint today is

that of a mild headache from the nitro paste.

 

OBJECTIVE:

VITAL SIGNS:  Blood pressure is 170/80 and pulse is regular at 73.  She is

afebrile.

GENERAL:  She is alert and oriented in no acute distress.

HEENT:  She is normocephalic.  Pupils are equal and reactive to light. 

Extraocular muscles are intact bilaterally.

NECK:  The neck veins are flat.  Carotids have good upstrokes bilaterally

without bruits.  Thyroid is nonpalpable.

RESPIRATORY:  Breath sounds equal bilaterally and clear to auscultation.

CARDIOVASCULAR:  Heart has a regular rhythm.  Normal S1 and S2.  No S3 or S4.

 No cardiac rubs or murmurs.

GASTROINTESTINAL:  Abdomen is soft and nontender without organomegaly.

EXTREMITIES:  Free of edema, digit clubbing, or cyanosis.

NEUROLOGIC:  Grossly intact.

SKIN:  Warm to touch.

LYMPH NODES:  Negative to palpation.

 

LABORATORY DATA:  Creatinine on admission is 1.31.  Today, the creatinine is

1.21.  Potassium is 3.6.  Cardiac markers are negative.

 

IMPRESSION:

1.  Chest and back pain, which may be due to hypertensive urgency.

2.  Chronic renal insufficiency.

3.  History of hypokalemia and hyperkalemia.

 

RECOMMENDATIONS:  The patient has had stable renal function and given her

history of diabetes, I think she would benefit from an ACE inhibitor.  I did

discuss with her her potassium issues.  She is currently on potassium 60 mg

twice daily along with her Bumex diuretic.  Her history of both hyperkalemia 
and hypokalemia is

somewhat disjointed.  She states that she has never been on an ACE inhibitor

or ARB.  I named several and she did not remember being on these medications.

 I think it is best to treat her blood pressure with either an ACE inhibitor

or ARB.  I will start with a low dose of lisinopril.  I have reduced her

potassium from 60 mg twice daily to 20 mg twice daily.  She should have

additional lab work each morning.  I will also ask for nephrologist, Dr. Moreno to see her to help us manage her hypertension with her kidney failure

and potassium problems.

 

 

 

 

MTDD

## 2018-01-28 NOTE — DISCHARGE SUMMARY
Discharge Summary


Date of Service


Jan 28, 2018.





Discharge Summary


Admission Date:


Jan 26, 2018 at 13:39


Discharge Date:  Jan 28, 2018


Discharge Disposition:  Home with services


Principal Diagnosis:


HTN urgency, Chest pain


Pending Studies/Follow-Up:


Repeat a BMP to check lytes and renal function at follow up, Outpatient 

Cardiology follow up for stress test





Medication Reconciliation


New Medications:  


Amlodipine Besylate (Amlodipine Besylate) 5 Mg Tab


10 MG PO QAM, #30 TAB





Lisinopril (Lisinopril) 5 Mg Tab


5 MG PO QAM, #30 TAB





Nystatin (Nystop) 45 Appln/15 Gm Powd


1 APPLN EXT PRN PRN for EXCORIATION, #1 BTL





Potassium Chloride (Klor-Con M20) 20 Meq Tabcr


20 MEQ PO BID, #1 TABS





 


Continued Medications:  


Allopurinol (Zyloprim) 300 Mg Tab


300 MG PO QAM, TAB





Aspirin Enteric Coated (Ecotrin Or Generic) 81 Mg Tab


81 MG PO QAM, TAB





Atorvastatin (Lipitor) 80 Mg Tab


80 MG PO QAM, TAB





Bumetanide (Bumetanide) 1 Mg Tab


1 MG PO BID17 for 30 Days, #60 TAB 1 Refill





Gabapentin (Neurontin) 300 Mg Cap


600 MG PO QAM, CAP





Gabapentin (Neurontin) 300 Mg Cap


900 MG PO HS





Insulin Aspart (Novolog Penfill) 100 Unit/Ml Inj


14 UNITS SQ QA





Insulin Aspart (Novolog Penfill) 100 Unit/Ml Inj


16 UNITS SQ LUNCH





Insulin Aspart (Novolog Penfill) 100 Unit/Ml Inj


18 UNITS SQ SUPPER





Insulin Glargine (Lantus Solostar) 100 Unit/Ml Inj


50 UNITS SQ QAM, #30





Insulin Glargine (Lantus Solostar) 100 Unit/Ml Inj


30 UNITS SC QPM





Lorazepam (Lorazepam) 2 Mg Tab


2 MG PO HS PRN for Sleep





Nitroglycerin (Nitrostat) 0.4 Mg Tab


0.4 MG UT PRN, TAB





 


Discontinued Medications:  


Potassium Ext Rel (Klor-Con) 20 Meq Tabcr


60 MEQ PO BID











Admission Information


HPI (per Admitting provider):


Pt is 74 y/o F with PMH HTN, insulin dependent DM II, CKD IV, dyslipidemia, 

NSTEMI s/p stent RCA in 2002, diastolic dysfunction, obesity and gout presented 

to ER from PCP office with c/o CP. Pt states 3 days ago started with aching to 

back near L scapula. She states pain was intermittent. Then yesterday with L 

upper back pain with associated heavy sensation of mid chest "like hand pushing 

on my chest". Had pain again this morning and took one SL nitro with decreased 

pain and went to PCP. Sent from PCP office to ER. En route by EMS was given 

another nitro and 324mg ASA. Pt states no further chest or back discomfort. Pt 

reports chronic LE edema, left leg always being worse then right. States 

yesterday noticed increased swelling and took extra 1/2 tab of bumex and 

reports decreased edema today. Following with wound clinic for wound to R lower 

leg. Has area wrapped once a week. Denies fever/chills, diaphoresis, N/V/D/C, HA

, dizziness, syncope, vision changes, neck pain, SOB, orthopnea, palpitations, 

cough, sore throat, choking, otalgia, rhinorrhea, abdominal pain, paresthesias, 

weakness, urinary symptoms.





8/2017 hospitalization - transient junctional bradycardia with hyperkalemia. 

Toprol 12.5mg daily was d/c at that time.


Physical Exam (per Admitting):  


   General Appearance:  no apparent distress, + obese


   Head:  normocephalic, atraumatic


   Eyes:  normal inspection, PERRL, EOMI, sclerae normal


   ENT:  hearing grossly normal, pharynx normal, + pertinent finding (mucous 

membranes moist)


   Neck:  supple, no JVD, trachea midline


   Respiratory/Chest:  chest non-tender, lungs clear, normal breath sounds, no 

respiratory distress, no accessory muscle use


   Cardiovascular:  regular rate, rhythm, no murmur, normal peripheral pulses


   Abdomen/GI:  normal bowel sounds, non tender, soft


   Back:  + pertinent finding (healed surgical incision to mid upper back 

without tenderness to palpation. Left scapular region without tenderness to 

palpation and no noted mass.)


   Extremities/Musculoskelatal:  no calf tenderness, normal capillary refill, 

non-tender (1+edema LE. RLE with wrap from wound clinic in place.)


   Neurologic/Psych:  alert, normal mood/affect, oriented x 3


   Skin:  normal color, warm/dry





Hospital Course


CHEST PAIN/ANGINA:


-likely secondary to HTN urgency


-serial CM negative


-on presentation patient had mid sternal chest heaviness x 2 days with left 

scapular discomfort. Took 1 SL nitro with decrease chest and back discomfort. 

EMS gave 1 SL nitro and 324mg ASA and pt was pain free; in the ER nitro paste 

was placed and pt continued to be pain free. 


-EKG: NSR, no ST elevations noted. Negative troponin


-CXR: cardiomegaly, no acute changes. 


-Cardiology consulted, appreciate recommendations


-TTE Report: *  -- Conclusions --


  *  No significant change compared to previous study of 7/21/17.


  *  Normal LV chamber size with mild concentric LVH, sigmoid appearing septum.


  *  Normal LV systolic function, EF 60-65%.


  *  No segmental left ventricular wall motion abnormalities are noted.


  *  Grade I diastolic dysfunction.


  *  Aortic valve sclerosis mild, without significant aortic valvular stenosis. 

Mild aortic regurgitation.


  *  Severe MAC with mild mitral regurgitation.


  *  Moderate left atrial enlargement.


-continue statin


-ASA 


-Nitro paste stopped


-continue bumex





HTN URGENCY:


-stopped nitro paste


-on amlodipine and lisinopril





DM TYPE II: controlled


-HbA1C: 6.8


-continue Lantus + NovoLog correction scale per protocol





CKD STAGE IV:


-baseline Cr 1.4-->1.2-->1.4 today


-continue to monitor


-avoid nephrotoxic agents when possible 


-Nephrology consulted, appreciate recs





WOUND R LEG:


-wound nurse consult


-on empiric vanco, changed to cefazolin for MSSA





GOUT:


-no evidence of exacerbation


-continue allopurinol 





PHYSICAL EXAM:


GENERAL: Patient is in no acute distress.


HEENT: No acute trauma, normocephalic, mucous membranes moist, no nasal 

congestion, no scleral icterus, conjunctivae clear.


NECK: No stridor, trachea is midline.


LUNGS: Clear to auscultation bilaterally, no wheeze, no rhonchi, breath sounds 

equal.


HEART: Without murmurs gallops or rubs, regular rate and rhythm.


ABDOMEN: Soft, nontender, bowel sounds positive, obese


EXTREMITIES: No cyanosis; trace LE edema, full range of motion of all the 

joints without pain or difficulty, no signs for acute trauma.


NEUROLOGIC: Oriented x 3, no acute motor or sensory deficits, no focal weakness.


SKIN: No rash, no jaundice, no diaphoresis. RLE wound on shin dry with scabbed 

lesion, no surrounding erythema, pain, or drainage noted


Total time spent on discharge = 37


This includes examination of the patient, discharge planning, medication 

reconciliation, and communication with other providers.





Discharge Instructions


1/28/18 06:57

















Test


  1/28/18


06:57 1/28/18


10:59


 


Anion Gap


  4.0 mmol/L


(3-11) 


 


 


Est Creatinine Clear Calc


Drug Dose 37.3 ml/min 


  


 


 


Estimated GFR (


American) 39.1 


  


 


 


Estimated GFR (Non-


American 33.7 


  


 


 


BUN/Creatinine Ratio 26.9 (10-20)  


 


Calcium Level


  9.6 mg/dl


(8.5-10.1) 


 


 


Phosphorus Level


  3.3 mg/dl


(2.5-4.9) 


 


 


Albumin


  2.9 gm/dl


(3.4-5.0) 


 


 


Bedside Glucose


  


  113 mg/dl


(70-90)

## 2018-04-08 NOTE — EMERGENCY ROOM VISIT NOTE
History


Report prepared by Docibe:  Shazia Mclean


Under the Supervision of:  Dr. Wilbert Reed M.D.


First contact with patient:  13:11


Chief Complaint:  ABDOMINAL PAIN


Stated Complaint:  ABDOMINAL DISCOMFORT,SOB,GURGLING


Nursing Triage Summary:  


pt to the ED with c/o upper and pain intermittantly for several days and now 


feels worse and makes her SOB





no n/v/d





has taken 3.5 fluid pills and has not voided





History of Present Illness


The patient is a 76 year old female who presents to the Emergency Room with 

complaints of intermittent upper abdominal pain for the past several days. She 

rates her discomfort as a 7/10 in severity and states her abdomen feels bloated 

and like "it could just blow up". Over the past 3 days, the pain has been 

constant. She admits to some shortness of breath from the abdominal bloating. 

She denies any nausea, vomiting or diarrhea. She has not urinated since 

yesterday but states she has not felt the need to urinate. She does take daily 

diuretics, 2 pills in the morning, 1.5 pills at night. She believes she has 

been eating and drinking normally. She has experienced no GERD type symptoms. 

She still has her gallbladder and appendix. She denies any history of 

diverticulitis. She has no history of COPD or emphysema. She has been afebrile. 

The patient denies any recent sick contacts. She notes she was here in the 

hospital in 2018 for pneumonia and has been battling "cold symptoms" 

for the past 1 month. She is diabetic and states her BSG was 202 this morning 

when checked at home. She notes she is currently on antibiotics for a wound on 

one leg that is [facility].





   Source of History:  patient


   Onset:  3 days PTA


   Position:  abdomen


   Symptom Intensity:  7/10


   Timing:  constant


   Associated Symptoms:  + SOB, No fevers, No nausea, No vomiting, No diarrhea, 

No urinary symptoms





Review of Systems


See HPI for pertinent positives & negatives. A total of 10 systems reviewed and 

were otherwise negative.





Past Medical & Surgical


Medical Problems:


(1) Acute renal failure syndrome


(2) Acute renal insufficiency


(3) Angina pectoris


(4) Benign hypertension


(5) Bradycardia


(6) Chest pain


(7) Chronic kidney disease, stage IV (severe)


(8) Coronary artery disease


(9) Diabetes mellitus type 2


(10) Dyslipidemia


(11) Edema of leg


(12) Glaucoma


(13) Gout


(14) Hyperkalemia


(15) Junctional bradycardia


(16) Lymphedema


(17) Morbid obesity


(18) Neuropathy


Surgical Problems:


(1) History of  section


(2) History of knee replacement procedure of right knee


(3) Hx of heart artery stent








Family History





Diabetes mellitus


FH: CAD (coronary artery disease)


Hypertension


Stroke





Social History


Smoking Status:  Never Smoker


Drug Use:  none


Marital Status:  


Housing Status:  lives with family


Occupation Status:  retired





Current/Historical Medications


Scheduled


Allopurinol (Zyloprim), 300 MG PO QAM


Amlodipine Besylate (Norvasc), 5 MG PO DAILY


Aspirin Enteric Coated (Ecotrin Or Generic), 81 MG PO QAM


Atorvastatin (Lipitor), 80 MG PO QAM


Bumetanide (Bumex), 1.5 TAB PO QAM


Bumetanide (Bumex), 1 TAB PO HS


Gabapentin (Neurontin), 600 MG PO QAM


Gabapentin (Neurontin), 900 MG PO HS


Insulin Aspart (Novolog Penfill), 14 UNITS SQ QA


Insulin Aspart (Novolog Penfill), 16 UNITS SQ LUNCH


Insulin Aspart (Novolog Penfill), 18 UNITS SQ SUPPER


Insulin Glargine (Lantus Solostar), 50 UNITS SQ QAM


Insulin Glargine (Lantus Solostar), 30 UNITS SC QPM


Lisinopril (Lisinopril), 5 MG PO QAM


Nitroglycerin (Nitrostat), 0.4 MG UT PRN


Potassium Chloride (Klor-Con M20), 20 MEQ PO BID





Scheduled PRN


Lorazepam (Lorazepam), 2 MG PO HS PRN for Sleep


Nystatin (Nystop), 1 APPLN EXT PRN PRN for EXCORIATION





Allergies


Coded Allergies:  


     Sulfamethoxazole w/Trimethoprim (Verified  Allergy, Unknown, RASH, 18)


     Codeine (Verified  Adverse Reaction, Mild, nausea, 18)





Physical Exam


Vital Signs











  Date Time  Temp Pulse Resp B/P (MAP) Pulse Ox O2 Delivery O2 Flow Rate FiO2


 


18 17:11  43 16     


 


18 16:41  44 20     


 


18 16:14  44 20 103/92 96 Room Air  


 


18 16:13    103/92    


 


18 16:11  46 13     


 


18 15:54     98 Room Air  


 


18 15:41  47 19     


 


18 14:41  44 13  95   


 


18 14:35  51      


 


18 14:31    115/61    


 


18 14:30  50 20 115/61 98 Room Air  


 


18 14:14    113/54    


 


18 12:44 36.3 56 20 102/57 98   











Physical Exam


GENERAL: Patient is in no acute distress.


HEENT: No acute trauma, normocephalic atraumatic, mucous membranes are dry, no 

nasal congestion, no scleral icterus.


NECK: No stridor, no adenopathy, no meningismus, trachea is midline.


LUNGS: Clear to auscultation bilaterally, no wheeze, no rhonchi, breath sounds 

equal.


HEART: Regular rate with a regular rhythm, no murmurs. 


ABDOMEN: Soft, mildly tender in the epigastrium, bowel sounds positive, no 

hernias, no peritonitis.


EXTREMITIES: No cyanosis, mild bilateral pedal edema, full range of motion of 

all the joints without pain or difficulty, no signs for acute trauma.


NEUROLOGIC: Oriented x 3, no acute motor or sensory deficits, no focal weakness.


SKIN: No rash, no jaundice, no diaphoresis.





Medical Decision & Procedures


ER Provider


Diagnostic Interpretation:


Radiology results as stated below per my review and radiologist interpretation:





CHEST ONE VIEW PORTABLE





CLINICAL HISTORY: Pain radiating to the abdomen.    





COMPARISON STUDY:  2018





FINDINGS: The heart is borderline enlarged. There is mild interstitial


prominence. An element of mild pulmonary vascular congestion cannot be excluded.


There is no focal pulmonary consolidation. There are no pleural effusions. There


is no free intraperitoneal air.





IMPRESSION: 


1. No evidence of free intraperitoneal air


2. No evidence of focal pulmonary consolidation


3. Mild nonspecific interstitial prominence





Electronically signed by:  Joaquim Cardona M.D.


2018 1:57 PM








CT SCAN OF THE ABDOMEN AND PELVIS WITHOUT CONTRAST





CLINICAL HISTORY: Abdominal plain and bloating    





COMPARISON STUDY:  2006 





TECHNIQUE: CT scan of the abdomen and pelvis was performed from the lung bases


to the proximal femurs. Images are reviewed in the axial, sagittal, and coronal


planes. IV contrast was not administered for this examination.  A dose lowering


technique was utilized adhering to the principles of ALARA.





CT DOSE: 1453.35 mGy.cm


FINDINGS:





Lower chest: There is mitral valve annulus calcification. There are coronary


artery calcifications. There is no pericardial effusion. There are dependent


atelectatic changes.





Liver: No focal hepatic masses are visualized. The liver is mildly enlarged. The


liver has a slightly nodular serosal surface.





Gallbladder: No calculi are visualized. There is mild


pericholecystic/periduodenal edema. Clinical correlation regards to


cholecystitis is recommended. If desired a nuclear medicine hepatobiliary scan


could be obtained to assess cystic duct patency.





Spleen: Normal in size and attenuation.





Pancreas: Unremarkable.





Adrenal glands: Unremarkable.





Kidneys: Bilateral calcifications are likely vascular. There is no


hydronephrosis. No ureteral or bladder calculi are visualized.





Bowel: There are no transition zones to indicate bowel obstruction. There is no


acute diverticulitis. There are no findings to indicate acute appendicitis. The


appendix is however not visualized with certainty. There is mild periduodenal


edema.





Peritoneum: There is no intraperitoneal free air or abdominal ascites.





Vasculature: The abdominal aorta is normal in course and caliber.





Adenopathy: There are mildly prominent lymph nodes at the level jake hepatis


and SMA, likely reactive.





Pelvic viscera: The bladder, and pelvic viscera are unremarkable.





Skeletal structures: No destructive osseous lesions are seen.





IMPRESSION:  


1. No evidence of bowel obstruction. No evidence of free air


2. Mild pericholecystic/duodenal edema. Clinical correlation in regards to acute


cholecystitis or duodenitis is recommended. Biliary ultrasonography might be


considered in follow-up. Alternatively a nuclear medicine hepatobiliary scan


could be obtained to assess cystic duct patency.





Electronically signed by:  Joaquim Cardona M.D.


2018 3:16 PM





Laboratory Results


18 13:35








Red Blood Count 3.72, Mean Corpuscular Volume 96.8, Mean Corpuscular Hemoglobin 

32.0, Mean Corpuscular Hemoglobin Concent 33.1, Mean Platelet Volume 11.8, 

Neutrophils (%) (Auto) 73.3, Lymphocytes (%) (Auto) 17.5, Monocytes (%) (Auto) 

7.3, Eosinophils (%) (Auto) 1.5, Basophils (%) (Auto) 0.3, Neutrophils # (Auto) 

5.22, Lymphocytes # (Auto) 1.25, Monocytes # (Auto) 0.52, Eosinophils # (Auto) 

0.11, Basophils # (Auto) 0.02





18 13:35








18 14:55

















Test


  18


13:35 18


14:33 18


14:55 18


15:38


 


White Blood Count


  7.13 K/uL


(4.8-10.8) 


  


  


 


 


Red Blood Count


  3.72 M/uL


(4.2-5.4) 


  


  


 


 


Hemoglobin


  11.9 g/dL


(12.0-16.0) 


  


  


 


 


Hematocrit 36.0 % (37-47)    


 


Mean Corpuscular Volume


  96.8 fL


() 


  


  


 


 


Mean Corpuscular Hemoglobin


  32.0 pg


(25-34) 


  


  


 


 


Mean Corpuscular Hemoglobin


Concent 33.1 g/dl


(32-36) 


  


  


 


 


Platelet Count


  145 K/uL


(130-400) 


  


  


 


 


Mean Platelet Volume


  11.8 fL


(7.4-10.4) 


  


  


 


 


Neutrophils (%) (Auto) 73.3 %    


 


Lymphocytes (%) (Auto) 17.5 %    


 


Monocytes (%) (Auto) 7.3 %    


 


Eosinophils (%) (Auto) 1.5 %    


 


Basophils (%) (Auto) 0.3 %    


 


Neutrophils # (Auto)


  5.22 K/uL


(1.4-6.5) 


  


  


 


 


Lymphocytes # (Auto)


  1.25 K/uL


(1.2-3.4) 


  


  


 


 


Monocytes # (Auto)


  0.52 K/uL


(0.11-0.59) 


  


  


 


 


Eosinophils # (Auto)


  0.11 K/uL


(0-0.5) 


  


  


 


 


Basophils # (Auto)


  0.02 K/uL


(0-0.2) 


  


  


 


 


RDW Standard Deviation


  52.5 fL


(36.4-46.3) 


  


  


 


 


RDW Coefficient of Variation


  15.0 %


(11.5-14.5) 


  


  


 


 


Immature Granulocyte % (Auto) 0.1 %    


 


Immature Granulocyte # (Auto)


  0.01 K/uL


(0.00-0.02) 


  


  


 


 


Est Creatinine Clear Calc


Drug Dose 14.0 ml/min 


  


  


  


 


 


Estimated GFR (


American) 11.3 


  


  


  


 


 


Estimated GFR (Non-


American 9.8 


  


  


  


 


 


BUN/Creatinine Ratio 26.5 (10-20)    


 


Calcium Level


  9.2 mg/dl


(8.5-10.1) 


  


  


 


 


Total Bilirubin


  0.4 mg/dl


(0.2-1) 


  


  


 


 


Alanine Aminotransferase


(ALT/SGPT) 23 U/L (12-78) 


  


  


  


 


 


Alkaline Phosphatase


  54 U/L


() 


  


  


 


 


Troponin I


  0.020 ng/ml


(0-0.045) 


  


  


 


 


Total Protein


  7.2 gm/dl


(6.4-8.2) 


  


  


 


 


Albumin


  3.1 gm/dl


(3.4-5.0) 


  


  


 


 


Globulin


  4.1 gm/dl


(2.5-4.0) 


  


  


 


 


Albumin/Globulin Ratio 0.8 (0.9-2)    


 


Lipase


  87 U/L


() 


  


  


 


 


Thyroid Stimulating Hormone


(TSH) 1.920 uIu/ml


(0.300-4.500) 


  


  


 


 


Urine Color  DK YELLOW   


 


Urine Appearance  CLOUDY (CLEAR)   


 


Urine pH  5.0 (4.5-7.5)   


 


Urine Specific Gravity


  


  1.023


(1.000-1.030) 


  


 


 


Urine Protein  TRACE (NEG)   


 


Urine Glucose (UA)  NEG (NEG)   


 


Urine Ketones  TRACE (NEG)   


 


Urine Occult Blood  NEG (NEG)   


 


Urine Nitrite  NEG (NEG)   


 


Urine Bilirubin  NEG (NEG)   


 


Urine Urobilinogen  NEG (NEG)   


 


Urine Leukocyte Esterase  TRACE (NEG)   


 


Urine WBC (Auto)  1-5 /hpf (0-5)   


 


Urine RBC (Auto)


  


  5-10 /hpf


(0-4) 


  


 


 


Urine Hyaline Casts (Auto)  >30 /lpf (0-5)   


 


Urine Epithelial Cells (Auto)


  


  20-30 /lpf


(0-5) 


  


 


 


Urine Bacteria (Auto)  NEG (NEG)   


 


Urine Pathogenic Casts


  


  1-5 GRANULAR


CASTS /lpf (0) 


  


 


 


Magnesium Level


  


  


  2.4 mg/dl


(1.8-2.4) 


 


 


Aspartate Amino Transf


(AST/SGOT) 


  


  14 U/L (15-37) 


  


 


 


Bedside Hemoglobin


  


  


  


  12.2 g/dl


(12.0-16.0)


 


Bedside Hematocrit    36 % (37-47) 


 


Bedside Sodium


  


  


  


  133 mEq/L


(135-144)


 


Bedside Potassium


  


  


  


  4.9 mEq/L


(3.3-5.0)


 


Bedside Chloride


  


  


  


  96 mEq/L


(101-112)


 


Bedside Total CO2


  


  


  


  26 mEq/l


(24-31)


 


Anion Gap


  


  


  


  17.0 mmol/L


(16-25)


 


Bedside Blood Urea Nitrogen


  


  


  


  120 mg/dl


(7-18)


 


Bedside Creatinine


  


  


  


  4.1 mg/dl


(0.6-1.3)


 


Bedside Glucose (other)


  


  


  


  163 mg/dl


(70-99)


 


Bedside Ionized Calcium (Maria E)


  


  


  


  1.22 mmol/l


(1.12-1.32)





Laboratory results reviewed by me.





Medications Administered











 Medications


  (Trade)  Dose


 Ordered  Sig/Will


 Route  Start Time


 Stop Time Status Last Admin


Dose Admin


 


 Sodium Chloride  500 ml @ 


 999 mls/hr  Q31M STAT


 IV  18 13:18


 18 13:48 DC 18 13:18


999 MLS/HR


 


 Sodium Chloride  1,000 ml @ 


 999 mls/hr  Q1H1M STAT


 IV  18 14:30


 18 15:30 DC 18 16:13


999 MLS/HR


 


 Sodium


 Polystyrene


 Sulfonate


  (Kayexalate Susp)  15 gm  NOW  STAT


 PO  18 16:17


 18 16:20 DC 18 16:37


15 GM











ECG Per My Interpretation


Indication:  abdominal pain


Rate (beats per minute):  46


Rhythm:  junctional


Findings:  no ectopy, other (no ST elevation, no PVC's)


Change:


Patient's electrocardiogram interpreted by me.





ED Course


1312: The patient was evaluated in room C11. A complete history and physical 

exam was performed.





1318:  ml @ 999 mls/hr IV. 





1430: NSS 1000 ml @ 999 mls/hr IV. 





1535: I reevaluated the patient. She is resting comfortably. I discussed her 

results and my recommendation she remain in the hospital for further evaluation 

and management and she verbalized complete understanding and agreement. 





1546: I discussed the patients case with Dr. Stauffer, [facility] Hospitalist. The 

patient will be further evaluated.





Medical Decision


The differential diagnoses considered include bowel obstruction, gastritis, 

ulcer, biliary colic, acute cholecystitis, pancreatitis, UTI, dehydration, 

diverticulitis and hernia. 





There is no leukocytosis or concerning anemia.  Renal panel testing shows some 

hyponatremia and acute renal failure with a creatinine of over 4.  No hepatitis 

or pancreatitis.  The patient appears to be in a euthyroid state.  Urinalysis 

shows some contamination, no obvious infection.  EKG shows a junctional rhythm, 

no acute ischemic change.  Cardiac enzyme testing 1 is not consistent with 

acute cardiac injury.  Chest x-ray does not show pneumonia or CHF.  Abdominal 

and pelvis CT does not show evidence for bowel obstruction or acute surgical 

process.  There was some potential inflammation around the gallbladder, an 

ultrasound was recommended.





The patient presents with a decreased urine output and some abdominal bloating 

and pain.  She is in acute renal failure and will require a hospital stay.  She 

received IV saline, she was given about 1500 cc of saline  while in the ED.





I spoke to case management, I talked to the patient.  Hospitalization is 

required.  An ultrasound of the abdomen was ordered to better evaluate the 

gallbladder, this result is pending.





Medication Reconcilliation


Current Medication List:  was personally reviewed by me





Blood Pressure Screening


Patient's blood pressure:  Normal blood pressure


Blood pressure disposition:  Did not require urgent referral





Consults


Time Called:  1540


Consulting Physician:  Dr. Stauffer, [facility] Hospitalist


Returned Call:  [phone]


I discussed the patients case with Dr. Stauffer [facility] Hospitalist. The patient will 

be further evaluated.





Impression





 Primary Impression:  


 Acute renal failure


 Additional Impression:  


 Epigastric abdominal pain





Scribe Attestation


The scribe's documentation has been prepared under my direction and personally 

reviewed by me in its entirety. I confirm that the note above accurately 

reflects all work, treatment, procedures, and medical decision making performed 

by me.





Departure Information


Dispostion


Being Evaluated By Hospitalist





Referrals


Dedra Marcos M.D. (PCP)





Patient Instructions


My [facility]





Problem Qualifiers

## 2018-04-08 NOTE — HISTORY AND PHYSICAL
History & Physical


Date & Time of Service:


2018 at 16:38


Chief Complaint:


Abdominal Discomfort,Sob,Gurgling


Primary Care Physician:


Lombardo,Mark, PA-C


History of Present Illness


Source:  patient


This is a 76 year old F with PMH significant for CKD, coronary artery with stent

, heart failure on diuretics, history of junctional arrhythmias when on beta 

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 

bradyarrhythmia as per 2018 cardiology inpatient evaluation, who was 

having abdominal bloating and not urinating in 2 days and was found in the 

emergency room to be in acute renal failure with uremia, high normal serum 

potassium level of 5, and bradycardia. Patient denies history of fever, dysuria

, or flank pain, changes in appetite or oral intake, or problems with bowel 

movements.





Past Medical/Surgical History


Medical Problems:


(1) Acute renal failure


(2) Acute renal failure syndrome


(3) Acute renal insufficiency


(4) Angina pectoris


(5) Benign hypertension


(6) Bradycardia


(7) Bradycardia


(8) Chest pain


(9) Chronic kidney disease, stage IV (severe)


(10) Coronary artery disease


(11) Diabetes mellitus type 2


(12) Dyslipidemia


(13) Edema of leg


(14) Glaucoma


(15) Gout


(16) Hyperkalemia


(17) Hyperkalemia


(18) Junctional bradycardia


(19) Lymphedema


(20) Morbid obesity


(21) Neuropathy


(22) Precordial chest pain


(23) Weakness


Surgical Problems:


(1) History of  section


(2) History of knee replacement procedure of right knee


(3) Hx of heart artery stent








Family History





Diabetes mellitus


FH: CAD (coronary artery disease)


Hypertension


Stroke





Social History


Smoking Status:  Never Smoker


Drug Use:  none


Marital Status:  


Housing status:  lives alone


Occupational Status:  retired





Immunizations


History of Influenza Vaccine:  Yes


Influenza Vaccine Date:  Sep 11, 2012


History of Tetanus Vaccine?:  No


History of Pneumococcal:  Yes


History of Hepatitis B Vaccine:  Unknown





Allergies


Coded Allergies:  


     Sulfamethoxazole w/Trimethoprim (Verified  Allergy, Unknown, RASH, 18)


     Codeine (Verified  Adverse Reaction, Mild, nausea, 18)





Home Medications


Scheduled


Allopurinol (Zyloprim), 300 MG PO QAM


Amlodipine Besylate (Norvasc), 5 MG PO DAILY


Aspirin Enteric Coated (Ecotrin Or Generic), 81 MG PO QAM


Atorvastatin (Lipitor), 80 MG PO QAM


Bumetanide (Bumex), 1.5 TAB PO QAM


Bumetanide (Bumex), 1 TAB PO HS


Gabapentin (Neurontin), 600 MG PO QAM


Gabapentin (Neurontin), 900 MG PO HS


Insulin Aspart (Novolog Penfill), 14 UNITS SQ QA


Insulin Aspart (Novolog Penfill), 16 UNITS SQ LUNCH


Insulin Aspart (Novolog Penfill), 18 UNITS SQ SUPPER


Insulin Glargine (Lantus Solostar), 50 UNITS SQ QAM


Insulin Glargine (Lantus Solostar), 30 UNITS SC QPM


Lisinopril (Lisinopril), 5 MG PO QAM


Nitroglycerin (Nitrostat), 0.4 MG UT PRN


Potassium Chloride (Klor-Con M20), 20 MEQ PO BID





Scheduled PRN


Lorazepam (Lorazepam), 2 MG PO HS PRN for Sleep


Nystatin (Nystop), 1 APPLN EXT PRN PRN for EXCORIATION





Review of Systems


Constitutional:  No fever, No sweats


Eyes:  No worsening of vision, No eye pain


ENT:  No hearing loss, No trouble swallowing


Respiratory:  No cough, No sputum, No shortness of breath


Cardiovascular:  No chest pain, No palpitations


Abdomen:  No pain, No nausea, No vomiting, No diarrhea, No constipation


Musculoskeletal:  No joint pain, No swelling


Genitourinary - Female:  + urinary retention, No dysuria


Neurologic:  No paralysis, No numbness/tingling


Endocrine:  No excessive urination


Hematologic / Lymphatic:  No abnormal bleeding/bruising


Integumentary:  No rash, No itch





Physical Exam


Vital Signs











  Date Time  Temp Pulse Resp B/P (MAP) Pulse Ox O2 Delivery O2 Flow Rate FiO2


 


18 16:14  44 20 103/92 96 Room Air  


 


18 15:54     98 Room Air  


 


18 14:35  51      


 


18 14:30  50 20 115/61 98 Room Air  


 


18 12:44 36.3 56 20 102/57 98   








General Appearance:  no apparent distress, + obese


Head:  normocephalic, atraumatic


Eyes:  normal inspection, EOMI, sclerae normal


ENT:  normal ENT inspection, hearing grossly normal, pharynx normal


Neck:  supple, no JVD, trachea midline


Respiratory/Chest:  chest non-tender, lungs clear, normal breath sounds, no 

respiratory distress, no accessory muscle use


Cardiovascular:  no JVD, normal peripheral pulses, + bradycardia, + pertinent 

finding (1+ EDEMA bilateral lower extremities)


Abdomen/GI:  normal bowel sounds, non tender, soft, no organomegaly, no 

pulsatile mass


Back:  normal inspection, no CVA tenderness, no muscle spasm, normal range of 

motion


Extremities/Musculoskelatal:  normal inspection, no calf tenderness, normal 

range of motion, non-tender


Neurologic/Psych:  no motor/sensory deficits, alert, normal mood/affect, 

oriented x 3


Skin:  normal color, warm/dry, no rash





Diagnostics


Laboratory Results





Results Past 24 Hours








Test


  18


13:35 18


14:33 18


14:55 18


15:38 Range/Units


 


 


White Blood Count 7.13    4.8-10.8  K/uL


 


Red Blood Count 3.72    4.2-5.4  M/uL


 


Hemoglobin 11.9    12.0-16.0  g/dL


 


Hematocrit 36.0    37-47  %


 


Mean Corpuscular Volume 96.8      fL


 


Mean Corpuscular Hemoglobin 32.0    25-34  pg


 


Mean Corpuscular Hemoglobin


Concent 33.1


  


  


  


  32-36  g/dl


 


 


Platelet Count 145    130-400  K/uL


 


Mean Platelet Volume 11.8    7.4-10.4  fL


 


Neutrophils (%) (Auto) 73.3     %


 


Lymphocytes (%) (Auto) 17.5     %


 


Monocytes (%) (Auto) 7.3     %


 


Eosinophils (%) (Auto) 1.5     %


 


Basophils (%) (Auto) 0.3     %


 


Neutrophils # (Auto) 5.22    1.4-6.5  K/uL


 


Lymphocytes # (Auto) 1.25    1.2-3.4  K/uL


 


Monocytes # (Auto) 0.52    0.11-0.59  K/uL


 


Eosinophils # (Auto) 0.11    0-0.5  K/uL


 


Basophils # (Auto) 0.02    0-0.2  K/uL


 


RDW Standard Deviation 52.5    36.4-46.3  fL


 


RDW Coefficient of Variation 15.0    11.5-14.5  %


 


Immature Granulocyte % (Auto) 0.1     %


 


Immature Granulocyte # (Auto) 0.01    0.00-0.02  K/uL


 


Sodium Level 130    136-145  mmol/L


 


Potassium Level   5.0  3.5-5.1  mmol/L


 


Chloride Level 96      mmol/L


 


Carbon Dioxide Level 23    21-32  mmol/L


 


Anion Gap 11.0   17.0 16-25  mmol/L


 


Blood Urea Nitrogen 110    7-18  mg/dl


 


Creatinine


  4.15


  


  


  


  0.60-1.20


mg/dl


 


Est Creatinine Clear Calc


Drug Dose 14.0


  


  


  


   ml/min


 


 


Estimated GFR (


American) 11.3


  


  


  


   


 


 


Estimated GFR (Non-


American 9.8


  


  


  


   


 


 


BUN/Creatinine Ratio 26.5    10-20  


 


Random Glucose 196    70-99  mg/dl


 


Calcium Level 9.2    8.5-10.1  mg/dl


 


Magnesium Level   2.4  1.8-2.4  mg/dl


 


Total Bilirubin 0.4    0.2-1  mg/dl


 


Aspartate Amino Transf


(AST/SGOT) 


  


  14


  


  15-37  U/L


 


 


Alanine Aminotransferase


(ALT/SGPT) 23


  


  


  


  12-78  U/L


 


 


Alkaline Phosphatase 54      U/L


 


Troponin I 0.020    0-0.045  ng/ml


 


Total Protein 7.2    6.4-8.2  gm/dl


 


Albumin 3.1    3.4-5.0  gm/dl


 


Globulin 4.1    2.5-4.0  gm/dl


 


Albumin/Globulin Ratio 0.8    0.9-2  


 


Lipase 87      U/L


 


Thyroid Stimulating Hormone


(TSH) 1.920


  


  


  


  0.300-4.500


uIu/ml


 


Urine Color  DK YELLOW    


 


Urine Appearance  CLOUDY   CLEAR  


 


Urine pH  5.0   4.5-7.5  


 


Urine Specific Gravity  1.023   1.000-1.030  


 


Urine Protein  TRACE   NEG  


 


Urine Glucose (UA)  NEG   NEG  


 


Urine Ketones  TRACE   NEG  


 


Urine Occult Blood  NEG   NEG  


 


Urine Nitrite  NEG   NEG  


 


Urine Bilirubin  NEG   NEG  


 


Urine Urobilinogen  NEG   NEG  


 


Urine Leukocyte Esterase  TRACE   NEG  


 


Urine WBC (Auto)  1-5   0-5  /hpf


 


Urine RBC (Auto)  5-10   0-4  /hpf


 


Urine Hyaline Casts (Auto)  >30   0-5  /lpf


 


Urine Epithelial Cells (Auto)  20-30   0-5  /lpf


 


Urine Bacteria (Auto)  NEG   NEG  


 


Urine Pathogenic Casts


  


  1-5 GRANULAR


CASTS 


  


  0  /lpf


 


 


Bedside Hemoglobin    12.2 12.0-16.0  g/dl


 


Bedside Hematocrit    36 37-47  %


 


Bedside Sodium    133 135-144  mEq/L


 


Bedside Potassium    4.9 3.3-5.0  mEq/L


 


Bedside Chloride    96 101-112  mEq/L


 


Bedside Total CO2    26 24-31  mEq/l


 


Bedside Blood Urea Nitrogen    120 7-18  mg/dl


 


Bedside Creatinine    4.1 0.6-1.3  mg/dl


 


Bedside Glucose (other)    163 70-99  mg/dl


 


Bedside Ionized Calcium (Maria E)


  


  


  


  1.22


  1.12-1.32


mmol/l











Impression


Assessment and Plan


Acute renal failure with uremia on chronic CKD


-admission , creatinine 4.15, repeat lab as  and creatinine 4.1, 

patient has had received IV fluids in the Emergency room


-discussed with [facility] nephrologist Dr. Moreno with whom patient 

follows as outpatient in regards to labs and clinical situation, Dr. Moreno 

believes patient has acute tubular necrosis possible due to new medication such 

as lisinopril and advises to continue IV fluids and avoid lisinopril


-review of labs show no obvious urinary tract infection 


-review of CT abdomen shows no evidence for pre-renal obstruction, will send 

for renal ultrasound


-patient with urinary retention vs low urine output x 2 days, urine obtained in 

ED after straight cath, lang to be placed to monitor accurate output 





Electrolytes


-on potassium supplements at home due to diuretic use


-admission potassium 5


-hospitalist has ordered Kayexalate as patient's serum potassium on upper 

ranges of normal and patient bradycardic in the ED with heart rate in the low 

50s


-patient to get insulin in the hospital also for diabetes which will also drive 

potassium intracellularly, give albuterol


-bradycardia in the Emergency room, review of medications does not show Beta 

blocker use (cardiology inpatient note in 2018 discusses history 

junctional arrhythmias when on beta blocker and that chronic recurrent 

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





Cardiac


-History of coronary artery disease, status post percutaneous coronary 

intervention to the right coronary artery in  with residual disease of


the left anterior descending artery and diagonal: continue aspirin


--severe mitral annular calcification as reported in 2018 echocardiogram


-Diastolic dysfunction with normal left ventricular systolic function - patient 

is not fluid overloaded at this time and in acute renal failure so will hold 

diuretics for now


-bradycardia in the Emergency room, review of medications does not show Beta 

blocker use (cardiology inpatient note in 2018 discusses history 

junctional arrhythmias when on beta blocker and that chronic recurrent 

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





Abdomen CT shows Mild pericholecystic/duodenal edema


-no obvious signs of of cholecystis and no antibiotics given for now, hold 

statin


-ED physician ordered gallbladder ultrasound





Diabetes Mellitus on insulin


-continue home lantus 50 units qAM, lantus 30 units HS 


-simplify home short acting insulin aspart regimen as 14 units TID with meals


-fingerstick glucose checks, sliding scale insulin





DVT ppx: SCDs





Advanced Directives


Existing Living Will:  Yes


Existing Power of :  Yes





Resuscitation Status








VTE Prophylaxis


Will order VTE Prophylaxis:  Yes

## 2018-04-08 NOTE — DIAGNOSTIC IMAGING REPORT
CT SCAN OF THE ABDOMEN AND PELVIS WITHOUT CONTRAST



CLINICAL HISTORY: Abdominal plain and bloating    



COMPARISON STUDY:  6/30/2006 



TECHNIQUE: CT scan of the abdomen and pelvis was performed from the lung bases

to the proximal femurs. Images are reviewed in the axial, sagittal, and coronal

planes. IV contrast was not administered for this examination.  A dose lowering

technique was utilized adhering to the principles of ALARA.





CT DOSE: 1453.35 mGy.cm

FINDINGS:



Lower chest: There is mitral valve annulus calcification. There are coronary

artery calcifications. There is no pericardial effusion. There are dependent

atelectatic changes.



Liver: No focal hepatic masses are visualized. The liver is mildly enlarged. The

liver has a slightly nodular serosal surface.



Gallbladder: No calculi are visualized. There is mild

pericholecystic/periduodenal edema. Clinical correlation regards to

cholecystitis is recommended. If desired a nuclear medicine hepatobiliary scan

could be obtained to assess cystic duct patency.



Spleen: Normal in size and attenuation.



Pancreas: Unremarkable.



Adrenal glands: Unremarkable.



Kidneys: Bilateral calcifications are likely vascular. There is no

hydronephrosis. No ureteral or bladder calculi are visualized.



Bowel: There are no transition zones to indicate bowel obstruction. There is no

acute diverticulitis. There are no findings to indicate acute appendicitis. The

appendix is however not visualized with certainty. There is mild periduodenal

edema.



Peritoneum: There is no intraperitoneal free air or abdominal ascites.



Vasculature: The abdominal aorta is normal in course and caliber.



Adenopathy: There are mildly prominent lymph nodes at the level jake hepatis

and SMA, likely reactive.



Pelvic viscera: The bladder, and pelvic viscera are unremarkable.



Skeletal structures: No destructive osseous lesions are seen.



IMPRESSION:  

1. No evidence of bowel obstruction. No evidence of free air

2. Mild pericholecystic/duodenal edema. Clinical correlation in regards to acute

cholecystitis or duodenitis is recommended. Biliary ultrasonography might be

considered in follow-up. Alternatively a nuclear medicine hepatobiliary scan

could be obtained to assess cystic duct patency.







Electronically signed by:  Joaquim Cardona M.D.

4/8/2018 3:16 PM



Dictated Date/Time:  4/8/2018 3:06 PM

## 2018-04-08 NOTE — DIAGNOSTIC IMAGING REPORT
CHEST ONE VIEW PORTABLE



CLINICAL HISTORY: Pain radiating to the abdomen.    



COMPARISON STUDY:  1/26/2018



FINDINGS: The heart is borderline enlarged. There is mild interstitial

prominence. An element of mild pulmonary vascular congestion cannot be excluded.

There is no focal pulmonary consolidation. There are no pleural effusions. There

is no free intraperitoneal air.[ 



IMPRESSION: 

1. No evidence of free intraperitoneal air

2. No evidence of focal pulmonary consolidation

3. Mild nonspecific interstitial prominence







Electronically signed by:  Joaquim Cardona M.D.

4/8/2018 1:57 PM



Dictated Date/Time:  4/8/2018 1:57 PM

## 2018-04-08 NOTE — DIAGNOSTIC IMAGING REPORT
ABDOMEN COMPLETE (US)



CLINICAL HISTORY: Acute renal failure, abdominal pain, bloating.    



COMPARISON STUDY:  CT scan dated 4/8/2018



FINDINGS: The liver is mildly enlarged. No focal hepatic masses are visualized.



No gallstones are visualized. There is gallbladder wall thickening/edema which

measures up to 1 cm. There is trace pericholecystic fluid. Technologist reports

a negative sonographic Saeed sign.



There is no ductal dilatation. The common bile duct measures 6 mm.



The spleen measures 12 cm in length.



The right kidney measures 12.1 cm in length. The left kidney measures 11.2 cm in

length. There is no right-sided hydronephrosis. There is mild dilatation of the

left renal pelvis.



The proximal abdominal aorta appeared normal. The mid and distal abdominal aorta

were not visualized. No abnormalities of the IVC were visualized. There are no

bladder abnormalities.



The study was mildly limited due to the patient's body habitus.



IMPRESSION:  

1. Gallbladder wall thickening/edema, measuring up to 1 cm. 

2. No gallstones identified

3. Trace pericholecystic fluid

4. No evidence of ductal dilatation.

5. Mild fullness of the left renal pelvis









Electronically signed by:  Joaquim Cardona M.D.

4/8/2018 6:27 PM



Dictated Date/Time:  4/8/2018 6:22 PM

## 2018-04-09 NOTE — GASTROINTESTINAL CONSULTATION
Gastrointestinal Consultation


Date of Consultation:  2018


Attending Physician:  Eh


Consulting Physician:  Rich


Reason for Consultation:  gb wall thickening


History of Present Illness


Patient is a 76 year old female w/ history of CAD s/p stent, CHF on diuretics, 

CKD and others listed below who presented to [facility] for upper abdominal pain x 2 

weeks and decreased urinary output x 48 hours - GI was asked to evaluate the pt 

for abnormal GB imaging. Pt was seen and evaluated, chart reviewed. Family is 

at bedside.  Pt notes upper abd discomfort x 2 weeks. Intermittent explained as 

a pressure and bloating. No correlation between pain and PO intake or BM. No 

nausea, vomiting. Has never had similar pain before. No change in BM. No black 

or bloody stools. Notes her upper abd bloating and pain become so severe that 

she felt SOB. This AM she notes improvement of her abdominal discomfort. Today, 

no fever, chills, CP, SOB.





NSAIDs: ASA daily, advil maybe once a week for aches and pains





This AM, VSS. No evidence of leukocytosis, H&H 10.3, 31.8 w/ plt 122. Lipase 87

  with non-elevated liver function tests. On arrival  CRT 4 currently 

trending down to BUN 96, CRT 2.6. 





ABD US 18: Gallbladder wall thickening/edema, measuring up to 1 cm. No 

gallstones identified Trace pericholecystic fluid No evidence of ductal 

dilatation. Mild fullness of the left renal pelvis


CT ABD 18: No evidence of bowel obstruction. No evidence of free air Mild 

pericholecystic/duodenal edema. Clinical correlation in regards to acute 

cholecystitis or duodenitis is recommended. Biliary ultrasonography might be 

considered in follow-up. Alternatively a nuclear medicine hepatobiliary scan 

could be obtained to assess cystic duct patency.





Past Medical/Surgical History


Medical Problems:


(1) Acute renal failure


Status: Acute  





(2) Acute renal failure


Status: Acute  





(3) Bradycardia


Status: Acute  





(4) Epigastric abdominal pain


Status: Acute  





(5) Hyperkalemia


Status: Acute  





(6) Precordial chest pain


Status: Acute  





(7) Weakness


Status: Acute  








Past Medical History:


CKD-V, DAVEY, angina, HTN, dyslipidemia, glaucoma, gout, T2DM, gout


Past Surgical History:


wisdom teeth


 x 3


R total knee replacement 


hx cardiac stent


colonoscopy





Family History





Diabetes mellitus


FH: CAD (coronary artery disease)


Hypertension


Stroke





Social History


Smoking Status:  Never Smoker


Drug Use:  none


Marital Status:  


Housing Status:  lives with family


Occupation Status:  retired





Allergies


Coded Allergies:  


     Sulfamethoxazole w/Trimethoprim (Verified  Allergy, Unknown, RASH, 18)


     Codeine (Verified  Adverse Reaction, Mild, nausea, 18)





Current Medications





Home Meds and Scripts








 Medications  Dose


 Route/Sig


 Max Daily Dose Days Date Category


 


 Bumex


  (Bumetanide) 1 Mg


 Tab  1 Tab


 PO HS


   90 18 Reported


 


 Bumex


  (Bumetanide) 1 Mg


 Tab  1.5 Tab


 PO QAM


   90 18 Reported


 


 Norvasc


  (Amlodipine


 Besylate) 5 Mg Tab  5 Mg


 PO DAILY


    18 Reported


 


 Nystop (Nystatin)


 45 Appln/15 Gm


 Powd  1 Appln


 EXT PRN PRN


    18 Rx


 


 Klor-Con M20


  (Potassium


 Chloride) 20 Meq


 Tabcr  20 Meq


 PO BID


    18 Rx


 


 Lisinopril 5 Mg


 Tab  5 Mg


 PO QAM


    18 Rx


 


 Novolog Penfill


  (Insulin Aspart)


 100 Unit/Ml Inj  18 Units


 SQ SUPPER


    17 Reported


 


 Lorazepam 2 Mg Tab  2 Mg


 PO HS PRN


    17 Reported


 


 Neurontin


  (Gabapentin) 300


 Mg Cap  900 Mg


 PO HS


    17 Reported


 


 Lantus Solostar


  (Insulin


 Glargine) 100


 Unit/Ml Inj  30 Units


 SC QPM


    17 Reported


 


 Lantus Solostar


  (Insulin


 Glargine) 100


 Unit/Ml Inj  50 Units


 SQ QAM


    17 Reported


 


 Novolog Penfill


  (Insulin Aspart)


 100 Unit/Ml Inj  16 Units


 SQ LUNCH


    17 Reported


 


 Novolog Penfill


  (Insulin Aspart)


 100 Unit/Ml Inj  14 Units


 SQ QA


    17 Reported


 


 Lipitor


  (Atorvastatin


 Calcium) 80 Mg Tab  80 Mg


 PO QAM


    12/14/15 Reported


 


 Zyloprim


  (Allopurinol) 300


 Mg Tab  300 Mg


 PO QAM


    12 Reported


 


 Ecotrin Or


 Generic (Aspirin)


 81 Mg Tab  81 Mg


 PO QAM


    12 Reported


 


 Neurontin


  (Gabapentin) 300


 Mg Cap  600 Mg


 PO QAM


    12 Reported


 


 Nitrostat


  (Nitroglycerin)


 0.4 Mg Tab  0.4 Mg


 UT PRN


    12 Reported











Review of Systems


Constitutional:  No fever, No chills, No weight loss, No weakness


Respiratory:  No cough, No shortness of breath


Cardiac:  No chest pain, No edema


Abdomen:  No pain, No nausea, No vomiting, No diarrhea, No constipation, No GI 

bleeding





Physical Exam











  Date Time  Temp Pulse Resp B/P (MAP) Pulse Ox O2 Delivery O2 Flow Rate FiO2


 


18 08:00     95 Room Air  


 


18 08:00 36.9 78 18 113/66 (82) 94 Room Air  


 


18 04:34 36.8 74 22 112/73 (86) 95 Room Air  


 


18 04:00      Room Air  


 


18 23:59      Room Air  


 


18 23:59 36.6 73 14 104/39 (60) 96 Room Air  


 


18 23:59 36.6 73 14 104/39 (60) 96 Room Air  


 


18 20:00 36.4 71 16 116/69 (85) 97 Room Air  


 


18 20:00      Room Air  


 


18 18:46 36.4 72 18 106/45 (65) 98 Room Air  


 


18 18:28  70 18 132/57 96   


 


18 17:11  43 16     


 


18 16:41  44 20     


 


18 16:14  44 20 103/92 96 Room Air  


 


18 16:13    103/92    


 


18 16:11  46 13     


 


18 15:54     98 Room Air  


 


18 15:41  47 19     


 


18 14:41  44 13  95   


 


18 14:35  51      


 


18 14:31    115/61    


 


18 14:30  50 20 115/61 98 Room Air  


 


18 14:14    113/54    


 


18 12:44 36.3 56 20 102/57 98   








General Appearance:  no apparent distress


Neck:  supple, trachea midline


Respiratory/Chest:  lungs clear


Cardiovascular:  regular rate, rhythm


Abdomen:  normal bowel sounds, soft, no organomegaly, + tenderness (mild upper 

abd tenderness, no rebound or guarding)


Neurologic/Psych:  alert, normal mood/affect, oriented x 3





Laboratory Results





Last 24 Hours








Test


  18


13:35 18


14:33 18


14:55 18


15:38


 


White Blood Count 7.13 K/uL    


 


Red Blood Count 3.72 M/uL    


 


Hemoglobin 11.9 g/dL    


 


Hematocrit 36.0 %    


 


Mean Corpuscular Volume 96.8 fL    


 


Mean Corpuscular Hemoglobin 32.0 pg    


 


Mean Corpuscular Hemoglobin


Concent 33.1 g/dl 


  


  


  


 


 


Platelet Count 145 K/uL    


 


Mean Platelet Volume 11.8 fL    


 


Neutrophils (%) (Auto) 73.3 %    


 


Lymphocytes (%) (Auto) 17.5 %    


 


Monocytes (%) (Auto) 7.3 %    


 


Eosinophils (%) (Auto) 1.5 %    


 


Basophils (%) (Auto) 0.3 %    


 


Neutrophils # (Auto) 5.22 K/uL    


 


Lymphocytes # (Auto) 1.25 K/uL    


 


Monocytes # (Auto) 0.52 K/uL    


 


Eosinophils # (Auto) 0.11 K/uL    


 


Basophils # (Auto) 0.02 K/uL    


 


RDW Standard Deviation 52.5 fL    


 


RDW Coefficient of Variation 15.0 %    


 


Immature Granulocyte % (Auto) 0.1 %    


 


Immature Granulocyte # (Auto) 0.01 K/uL    


 


Sodium Level 130 mmol/L    


 


Potassium Level  mmol/L   5.0 mmol/L  


 


Chloride Level 96 mmol/L    


 


Carbon Dioxide Level 23 mmol/L    


 


Anion Gap 11.0 mmol/L    17.0 mmol/L 


 


Blood Urea Nitrogen 110 mg/dl    


 


Creatinine 4.15 mg/dl    


 


Est Creatinine Clear Calc


Drug Dose 14.0 ml/min 


  


  


  


 


 


Estimated GFR (


American) 11.3 


  


  


  


 


 


Estimated GFR (Non-


American 9.8 


  


  


  


 


 


BUN/Creatinine Ratio 26.5    


 


Random Glucose 196 mg/dl    


 


Calcium Level 9.2 mg/dl    


 


Magnesium Level  mg/dl   2.4 mg/dl  


 


Total Bilirubin 0.4 mg/dl    


 


Aspartate Amino Transf


(AST/SGOT)  U/L 


  


  14 U/L 


  


 


 


Alanine Aminotransferase


(ALT/SGPT) 23 U/L 


  


  


  


 


 


Alkaline Phosphatase 54 U/L    


 


Troponin I 0.020 ng/ml    


 


Total Protein 7.2 gm/dl    


 


Albumin 3.1 gm/dl    


 


Globulin 4.1 gm/dl    


 


Albumin/Globulin Ratio 0.8    


 


Lipase 87 U/L    


 


Thyroid Stimulating Hormone


(TSH) 1.920 uIu/ml 


  


  


  


 


 


Urine Color  DK YELLOW   


 


Urine Appearance  CLOUDY   


 


Urine pH  5.0   


 


Urine Specific Gravity  1.023   


 


Urine Protein  TRACE   


 


Urine Glucose (UA)  NEG   


 


Urine Ketones  TRACE   


 


Urine Occult Blood  NEG   


 


Urine Nitrite  NEG   


 


Urine Bilirubin  NEG   


 


Urine Urobilinogen  NEG   


 


Urine Leukocyte Esterase  TRACE   


 


Urine WBC (Auto)  1-5 /hpf   


 


Urine RBC (Auto)  5-10 /hpf   


 


Urine Hyaline Casts (Auto)  >30 /lpf   


 


Urine Epithelial Cells (Auto)  20-30 /lpf   


 


Urine Bacteria (Auto)  NEG   


 


Urine Pathogenic Casts


  


  1-5 GRANULAR


CASTS /lpf 


  


 


 


Bedside Hemoglobin    12.2 g/dl 


 


Bedside Hematocrit    36 % 


 


Bedside Sodium    133 mEq/L 


 


Bedside Potassium    4.9 mEq/L 


 


Bedside Chloride    96 mEq/L 


 


Bedside Total CO2    26 mEq/l 


 


Bedside Blood Urea Nitrogen    120 mg/dl 


 


Bedside Creatinine    4.1 mg/dl 


 


Bedside Glucose (other)    163 mg/dl 


 


Bedside Ionized Calcium (Maria E)    1.22 mmol/l 


 


Test


  18


20:16 18


21:57 18


06:42 18


07:09


 


Bedside Glucose 128 mg/dl   129 mg/dl  


 


Sodium Level  136 mmol/L   137 mmol/L 


 


Potassium Level  3.9 mmol/L   3.8 mmol/L 


 


Chloride Level  100 mmol/L   102 mmol/L 


 


Carbon Dioxide Level  26 mmol/L   25 mmol/L 


 


Anion Gap  10.0 mmol/L   9.0 mmol/L 


 


Blood Urea Nitrogen  100 mg/dl   96 mg/dl 


 


Creatinine  3.35 mg/dl   2.58 mg/dl 


 


Est Creatinine Clear Calc


Drug Dose 


  17.3 ml/min 


  


  22.5 ml/min 


 


 


Estimated GFR (


American) 


  14.7 


  


  20.1 


 


 


Estimated GFR (Non-


American 


  12.7 


  


  17.4 


 


 


BUN/Creatinine Ratio  30.0   37.2 


 


Random Glucose  153 mg/dl   127 mg/dl 


 


Calcium Level  8.9 mg/dl   8.6 mg/dl 


 


Magnesium Level  2.2 mg/dl   


 


Total Bilirubin  0.4 mg/dl   0.4 mg/dl 


 


Aspartate Amino Transf


(AST/SGOT) 


  17 U/L 


  


  18 U/L 


 


 


Alanine Aminotransferase


(ALT/SGPT) 


  18 U/L 


  


  18 U/L 


 


 


Alkaline Phosphatase  49 U/L   48 U/L 


 


Total Protein  6.3 gm/dl   6.0 gm/dl 


 


Albumin  2.9 gm/dl   2.9 gm/dl 


 


Globulin  3.4 gm/dl   3.1 gm/dl 


 


Albumin/Globulin Ratio  0.8   0.9 


 


White Blood Count    5.09 K/uL 


 


Red Blood Count    3.30 M/uL 


 


Hemoglobin    10.3 g/dL 


 


Hematocrit    31.8 % 


 


Mean Corpuscular Volume    96.4 fL 


 


Mean Corpuscular Hemoglobin    31.2 pg 


 


Mean Corpuscular Hemoglobin


Concent 


  


  


  32.4 g/dl 


 


 


Platelet Count    122 K/uL 


 


Mean Platelet Volume    11.1 fL 


 


Neutrophils (%) (Auto)    77.2 % 


 


Lymphocytes (%) (Auto)    11.6 % 


 


Monocytes (%) (Auto)    7.9 % 


 


Eosinophils (%) (Auto)    2.9 % 


 


Basophils (%) (Auto)    0.2 % 


 


Neutrophils # (Auto)    3.93 K/uL 


 


Lymphocytes # (Auto)    0.59 K/uL 


 


Monocytes # (Auto)    0.40 K/uL 


 


Eosinophils # (Auto)    0.15 K/uL 


 


Basophils # (Auto)    0.01 K/uL 


 


RDW Standard Deviation    51.2 fL 


 


RDW Coefficient of Variation    14.8 % 


 


Immature Granulocyte % (Auto)    0.2 % 


 


Immature Granulocyte # (Auto)    0.01 K/uL 











Impression


Patient is a 76 year old female w/ abd fullness x 2 weeks, decreased urine 

output x 48 hours admitted through the ED for DAVEY on CKD-V. CT imaging w/ mild


pericholecystic/periduodenal edema. LFTs and lipase non-elevated, concern for 

acute cholecystitis.  General surgery was consulted in addition.





Plan


- LFTs, Lipase normal


- Consider HIDA scan


- Consider EGD during admission


   - ? duodenitis


- No GI role for ABX therapy


- DAVEY on CKD per primary team 








- Please call with any questions or concerns.





I performed a history and physical examination of the patient.  I have 

discussed the patient's case, impression and plan with CHERRY Santos. Her note reflects my findings and plan. Symptoms most likely related to 

renal failure. She is doing better. No need for EGD at this point.





Diomedes Villanueva MD

## 2018-04-09 NOTE — SURGERY CONSULTATION
Consultation


Date of Consultation:


Apr 9, 2018.


Attending Physician:


Carlton Stauffer M.D.


History of Present Illness


The patient is a 76 year old female who presents to the Emergency Room with 

complaints of intermittent upper abdominal pain for the past several days. She 

rates her discomfort as a 7/10 in severity and states her abdomen feels bloated 

and like "it could just blow up". Over the past 3 days, the pain has been 

constant. She admits to some shortness of breath from the abdominal bloating. 

She denies any nausea, vomiting or diarrhea. She has not urinated since 

yesterday but states she has not felt the need to urinate. She does take daily 

diuretics, 2 pills in the morning, 1.5 pills at night. She believes she has 

been eating and drinking normally. She has experienced no GERD type symptoms. 

She still has her gallbladder and appendix. She denies any history of 

diverticulitis. She has no history of COPD or emphysema. She has been afebrile. 

The patient denies any recent sick contacts. She notes she was here in the 

hospital in January of 2018 for pneumonia and has been battling "cold symptoms" 

for the past 1 month. She is diabetic and states her BSG was 202 this morning 

when checked at home. She notes she is currently on antibiotics for a wound on 

one leg that is [facility].


I was asked for consult possible acute cholecystitis, I reviewed pt's H/P with 

pt, now pt denies abdominal pain, no nausea, no vomiting,





Past Medical/Surgical History


Medical Problems:


(1) Acute renal failure


Status: Acute  





(2) Acute renal failure


Status: Acute  





(3) Bradycardia


Status: Acute  





(4) Epigastric abdominal pain


Status: Acute  





(5) Hyperkalemia


Status: Acute  





(6) Precordial chest pain


Status: Acute  





(7) Weakness


Status: Acute  











Family History





Diabetes mellitus


FH: CAD (coronary artery disease)


Hypertension


Stroke





Social History


Smoking Status:  Never Smoker


Smokeless Tobacco Use:  No


Alcohol Use:  none


Drug Use:  none


Marital Status:  


Housing Status:  lives with family


Occupation Status:  retired





Allergies


Coded Allergies:  


     Sulfamethoxazole w/Trimethoprim (Verified  Allergy, Unknown, RASH, 4/8/18)


     Codeine (Verified  Adverse Reaction, Mild, nausea, 4/8/18)





Home Medications


Scheduled


Allopurinol (Zyloprim), 300 MG PO QAM


Amlodipine Besylate (Norvasc), 5 MG PO DAILY


Aspirin Enteric Coated (Ecotrin Or Generic), 81 MG PO QAM


Atorvastatin (Lipitor), 80 MG PO QAM


Bumetanide (Bumex), 1.5 TAB PO QAM


Bumetanide (Bumex), 1 TAB PO HS


Gabapentin (Neurontin), 600 MG PO QAM


Gabapentin (Neurontin), 900 MG PO HS


Insulin Aspart (Novolog Penfill), 14 UNITS SQ QA


Insulin Aspart (Novolog Penfill), 16 UNITS SQ LUNCH


Insulin Aspart (Novolog Penfill), 18 UNITS SQ SUPPER


Insulin Glargine (Lantus Solostar), 50 UNITS SQ QAM


Insulin Glargine (Lantus Solostar), 30 UNITS SC QPM


Lisinopril (Lisinopril), 5 MG PO QAM


Nitroglycerin (Nitrostat), 0.4 MG UT PRN


Potassium Chloride (Klor-Con M20), 20 MEQ PO BID





Scheduled PRN


Lorazepam (Lorazepam), 2 MG PO HS PRN for Sleep


Nystatin (Nystop), 1 APPLN EXT PRN PRN for EXCORIATION





Current Inpatient Medications





Current Inpatient Medications








 Medications


  (Trade)  Dose


 Ordered  Sig/Will


 Route  Start Time


 Stop Time Status Last Admin


Dose Admin


 


 Aspirin


  (Ecotrin Tab)  81 mg  QAM


 PO  4/9/18 09:00


 5/9/18 08:59  4/9/18 07:48


81 MG


 


 Lorazepam


  (Ativan Tab)  2 mg  HS  PRN


 PO  4/8/18 16:45


 5/8/18 16:44   


 


 


 Glucose


  (Glucose 40% Gel)  15-30


 GRAMS 15


 GRAMS...  UD  PRN


 PO  4/8/18 17:00


 5/8/18 16:59   


 


 


 Glucose


  (Glucose Chew


 Tab)  4-8


 Tablets 4


 Tabl...  UD  PRN


 PO  4/8/18 17:00


 5/8/18 16:59   


 


 


 Dextrose


  (Dextrose 50%


 50ML Syringe)  25-50ML OF


 50% DW IV


 FOR...  UD  PRN


 IV  4/8/18 17:00


 5/8/18 16:59   


 


 


 Glucagon


  (Glucagon Inj)  1 mg  UD  PRN


 SQ  4/8/18 17:00


 5/8/18 16:59   


 


 


 Insulin Glargine


  (Lantus Solostar


 Pen)  30 units  QPM


 SC  4/8/18 21:00


 5/8/18 20:59  4/8/18 21:10


30 UNITS


 


 Sodium Chloride  1,000 ml @ 


 125 mls/hr  Q8H


 IV  4/8/18 19:30


 5/8/18 19:29  4/9/18 11:09


125 MLS/HR


 


 Insulin Glargine


  (Lantus Solostar


 Pen)  30 units  QAM


 SQ  4/10/18 09:00


 5/10/18 08:59   


 


 


 Insulin Aspart


  (novoLOG ASPART)  SLIDING


 SCALE  ACHS


 SC  4/9/18 11:00


 5/9/18 10:59  4/9/18 11:58


7 UNITS











Review of Systems


Constitutional:  No fever, No chills, No sweats, No weight loss, No weakness, 

No fatigue, No problem reported


Eyes:  No worsening of vision, No eye pain, No redness, No discharge, No 

diplopia, No problem reported


ENT:  No hearing loss, No unusual epistaxis, No nasal symptoms, No sore throat, 

No tinnitus, No dental problems, No trouble swallowing, No problem reported


Cardiovascular:  No chest pain, No orthopnea, No PND, No edema, No claudication

, No palpitations, No problem reported


Abdomen:  No pain, No nausea, No vomiting, No diarrhea, No constipation, No GI 

bleeding, No problem reported


Musculoskeletal:  No joint pain, No muscle pain, No swelling, No calf pain, No 

problem reported


Neurologic:  No memory loss, No paralysis, No weakness, No numbness/tingling, 

No vertigo, No balance problems, No problem reported


Psychiatric:  No depression symptoms, No anhedonism, No anxiety, No insomnia, 

No substance abuse, No problem reported


Endocrine:  No fatigue, No excessive thirst, No excessive urination, No problem 

reported


Hematologic / Lymphatic:  No abnormal bleeding/bruising, No clotting problems, 

No swollen lymph nodes, No night sweats, No problem reported





Physical Exam











  Date Time  Temp Pulse Resp B/P (MAP) Pulse Ox O2 Delivery O2 Flow Rate FiO2


 


4/9/18 12:00     95 Room Air  


 


4/9/18 11:48 37.1 86 20 115/69 (84) 99   


 


4/9/18 08:00     95 Room Air  


 


4/9/18 08:00 36.9 78 18 113/66 (82) 94 Room Air  


 


4/9/18 04:34 36.8 74 22 112/73 (86) 95 Room Air  


 


4/9/18 04:00      Room Air  


 


4/8/18 23:59      Room Air  


 


4/8/18 23:59 36.6 73 14 104/39 (60) 96 Room Air  


 


4/8/18 23:59 36.6 73 14 104/39 (60) 96 Room Air  


 


4/8/18 20:00 36.4 71 16 116/69 (85) 97 Room Air  


 


4/8/18 20:00      Room Air  


 


4/8/18 18:46 36.4 72 18 106/45 (65) 98 Room Air  


 


4/8/18 18:28  70 18 132/57 96   


 


4/8/18 17:11  43 16     


 


4/8/18 16:41  44 20     


 


4/8/18 16:14  44 20 103/92 96 Room Air  


 


4/8/18 16:13    103/92    


 


4/8/18 16:11  46 13     


 


4/8/18 15:54     98 Room Air  


 


4/8/18 15:41  47 19     


 


4/8/18 14:41  44 13  95   


 


4/8/18 14:35  51      


 


4/8/18 14:31    115/61    


 


4/8/18 14:30  50 20 115/61 98 Room Air  








General Appearance:  WD/WN, no apparent distress


Head:  normocephalic


Eyes:  normal inspection


ENT:  normal ENT inspection


Neck:  supple, no JVD


Respiratory/Chest:  chest non-tender, lungs clear, normal breath sounds


Cardiovascular:  regular rate, rhythm, no edema, no gallop, no JVD


Abdomen/GI:  normal bowel sounds, non tender, soft, no organomegaly, no 

pulsatile mass


Extremities/Musculoskelatal:  normal inspection, no calf tenderness, normal 

capillary refill


Neurologic/Psych:  no motor/sensory deficits, alert, normal mood/affect


Skin:  normal color, warm/dry, no rash





Laboratory Results





Last 24 Hours








Test


  4/8/18


14:33 4/8/18


14:55 4/8/18


15:38 4/8/18


20:16


 


Urine Color DK YELLOW    


 


Urine Appearance CLOUDY    


 


Urine pH 5.0    


 


Urine Specific Gravity 1.023    


 


Urine Protein TRACE    


 


Urine Glucose (UA) NEG    


 


Urine Ketones TRACE    


 


Urine Occult Blood NEG    


 


Urine Nitrite NEG    


 


Urine Bilirubin NEG    


 


Urine Urobilinogen NEG    


 


Urine Leukocyte Esterase TRACE    


 


Urine WBC (Auto) 1-5 /hpf    


 


Urine RBC (Auto) 5-10 /hpf    


 


Urine Hyaline Casts (Auto) >30 /lpf    


 


Urine Epithelial Cells (Auto) 20-30 /lpf    


 


Urine Bacteria (Auto) NEG    


 


Urine Pathogenic Casts


  1-5 GRANULAR


CASTS /lpf 


  


  


 


 


Potassium Level  5.0 mmol/L   


 


Magnesium Level  2.4 mg/dl   


 


Aspartate Amino Transf


(AST/SGOT) 


  14 U/L 


  


  


 


 


Bedside Hemoglobin   12.2 g/dl  


 


Bedside Hematocrit   36 %  


 


Bedside Sodium   133 mEq/L  


 


Bedside Potassium   4.9 mEq/L  


 


Bedside Chloride   96 mEq/L  


 


Bedside Total CO2   26 mEq/l  


 


Anion Gap   17.0 mmol/L  


 


Bedside Blood Urea Nitrogen   120 mg/dl  


 


Bedside Creatinine   4.1 mg/dl  


 


Bedside Glucose (other)   163 mg/dl  


 


Bedside Ionized Calcium (Maria E)   1.22 mmol/l  


 


Bedside Glucose    128 mg/dl 


 


Test


  4/8/18


21:57 4/9/18


06:42 4/9/18


07:09 4/9/18


11:29


 


Sodium Level 136 mmol/L   137 mmol/L  


 


Potassium Level 3.9 mmol/L   3.8 mmol/L  


 


Chloride Level 100 mmol/L   102 mmol/L  


 


Carbon Dioxide Level 26 mmol/L   25 mmol/L  


 


Anion Gap 10.0 mmol/L   9.0 mmol/L  


 


Blood Urea Nitrogen 100 mg/dl   96 mg/dl  


 


Creatinine 3.35 mg/dl   2.58 mg/dl  


 


Est Creatinine Clear Calc


Drug Dose 17.3 ml/min 


  


  22.5 ml/min 


  


 


 


Estimated GFR (


American) 14.7 


  


  20.1 


  


 


 


Estimated GFR (Non-


American 12.7 


  


  17.4 


  


 


 


BUN/Creatinine Ratio 30.0   37.2  


 


Random Glucose 153 mg/dl   127 mg/dl  


 


Calcium Level 8.9 mg/dl   8.6 mg/dl  


 


Magnesium Level 2.2 mg/dl    


 


Total Bilirubin 0.4 mg/dl   0.4 mg/dl  


 


Aspartate Amino Transf


(AST/SGOT) 17 U/L 


  


  18 U/L 


  


 


 


Alanine Aminotransferase


(ALT/SGPT) 18 U/L 


  


  18 U/L 


  


 


 


Alkaline Phosphatase 49 U/L   48 U/L  


 


Total Protein 6.3 gm/dl   6.0 gm/dl  


 


Albumin 2.9 gm/dl   2.9 gm/dl  


 


Globulin 3.4 gm/dl   3.1 gm/dl  


 


Albumin/Globulin Ratio 0.8   0.9  


 


Bedside Glucose  129 mg/dl   172 mg/dl 


 


White Blood Count   5.09 K/uL  


 


Red Blood Count   3.30 M/uL  


 


Hemoglobin   10.3 g/dL  


 


Hematocrit   31.8 %  


 


Mean Corpuscular Volume   96.4 fL  


 


Mean Corpuscular Hemoglobin   31.2 pg  


 


Mean Corpuscular Hemoglobin


Concent 


  


  32.4 g/dl 


  


 


 


Platelet Count   122 K/uL  


 


Mean Platelet Volume   11.1 fL  


 


Neutrophils (%) (Auto)   77.2 %  


 


Lymphocytes (%) (Auto)   11.6 %  


 


Monocytes (%) (Auto)   7.9 %  


 


Eosinophils (%) (Auto)   2.9 %  


 


Basophils (%) (Auto)   0.2 %  


 


Neutrophils # (Auto)   3.93 K/uL  


 


Lymphocytes # (Auto)   0.59 K/uL  


 


Monocytes # (Auto)   0.40 K/uL  


 


Eosinophils # (Auto)   0.15 K/uL  


 


Basophils # (Auto)   0.01 K/uL  


 


RDW Standard Deviation   51.2 fL  


 


RDW Coefficient of Variation   14.8 %  


 


Immature Granulocyte % (Auto)   0.2 %  


 


Immature Granulocyte # (Auto)   0.01 K/uL  











Assessment & Plan


U/S study-FINDINGS: The liver is mildly enlarged. No focal hepatic masses are 

visualized.





No gallstones are visualized. There is gallbladder wall thickening/edema which


measures up to 1 cm. There is trace pericholecystic fluid. Technologist reports


a negative sonographic Saeed sign.





There is no ductal dilatation. The common bile duct measures 6 mm.





The spleen measures 12 cm in length.





The right kidney measures 12.1 cm in length. The left kidney measures 11.2 cm in


length. There is no right-sided hydronephrosis. There is mild dilatation of the


left renal pelvis.





The proximal abdominal aorta appeared normal. The mid and distal abdominal aorta


were not visualized. No abnormalities of the IVC were visualized. There are no


bladder abnormalities.





The study was mildly limited due to the patient's body habitus.





IMPRESSION:  


1. Gallbladder wall thickening/edema, measuring up to 1 cm. 


2. No gallstones identified


3. Trace pericholecystic fluid


4. No evidence of ductal dilatation.


5. Mild fullness of the left renal pelvis





CT scan-FINDINGS:





Lower chest: There is mitral valve annulus calcification. There are coronary


artery calcifications. There is no pericardial effusion. There are dependent


atelectatic changes.





Liver: No focal hepatic masses are visualized. The liver is mildly enlarged. The


liver has a slightly nodular serosal surface.





Gallbladder: No calculi are visualized. There is mild


pericholecystic/periduodenal edema. Clinical correlation regards to


cholecystitis is recommended. If desired a nuclear medicine hepatobiliary scan


could be obtained to assess cystic duct patency.





Spleen: Normal in size and attenuation.





Pancreas: Unremarkable.





Adrenal glands: Unremarkable.





Kidneys: Bilateral calcifications are likely vascular. There is no


hydronephrosis. No ureteral or bladder calculi are visualized.





Bowel: There are no transition zones to indicate bowel obstruction. There is no


acute diverticulitis. There are no findings to indicate acute appendicitis. The


appendix is however not visualized with certainty. There is mild periduodenal


edema.





Peritoneum: There is no intraperitoneal free air or abdominal ascites.





Vasculature: The abdominal aorta is normal in course and caliber.





Adenopathy: There are mildly prominent lymph nodes at the level jake hepatis


and SMA, likely reactive.





Pelvic viscera: The bladder, and pelvic viscera are unremarkable.





Skeletal structures: No destructive osseous lesions are seen.





IMPRESSION:  


1. No evidence of bowel obstruction. No evidence of free air


2. Mild pericholecystic/duodenal edema. Clinical correlation in regards to acute


cholecystitis or duodenitis is recommended. Biliary ultrasonography might be


considered in follow-up. Alternatively a nuclear medicine hepatobiliary scan


could be obtained to assess cystic duct patency.








Assessment: pt is a 76 year old female who was admitted to hospital for 2 weeks 

history epigastric discomfort, U/S and CT scan- possible cholecystitis, 





IMP: possible chronic cholecystitis, normal WBC, now no symptoms. 


I agree with GI recommend- HIDA scan, possible EGD to R/O duodenitis. 


will F/U 


Thanks,

## 2018-04-09 NOTE — DIAGNOSTIC IMAGING REPORT
NUCLEAR HEPATOBILIARY SCAN 



CLINICAL HISTORY: Gallbladder wall thickening.



COMPARISON STUDY: Abdominal ultrasound dated 4/8/2018.



TECHNIQUE: Dynamic images of the liver and anterior abdomen were obtained every

5 minutes for a total of 55 minutes following the IV administration of 5.3mCi of

technetium 99m Choletec.



FINDINGS: The hepatobiliary scan shows prompt and homogeneous hepatic uptake.

There is visualized activity within the intra and extrahepatic biliary tree at 

10 minutes, and within the gallbladder at 15 minutes. There is normal biliary to

bowel transit, with small bowel visualized by 45 minutes.





IMPRESSION:  Unremarkable nuclear hepatobiliary scan. There is no scintigraphic

evidence of cholecystitis.







Electronically signed by:  Wilbert Barron M.D.

4/9/2018 9:15 PM



Dictated Date/Time:  4/9/2018 9:14 PM

## 2018-04-09 NOTE — PROGRESS NOTE
Progress Note


Date of Service


Apr 9, 2018.





Progress Note


Subjective : Patient denies acute pain. No shortness of breath. Have advised 

patient that if she feels swelling of extremities or shortness of breath then 

she should let a nurse know because she is getting IV fluids and has history of 

cardiac diastolic dysfunction





Physical Exam





Acute renal failure with uremia on chronic CKD


-Baseline creatinine has been 2.0, patient followed by [facility] 

Dr. Moreno


-admission , creatinine 4.15, repeat lab as  and creatinine 4.1


-creatinine downtrending with IV fluids


-4/9/18 AM labs with creatinine 2.58, IV fluids stopped at night on 4/9/18 to 

avoid fluid overload as patient has cardiac history and on lasix at home


-monitor renal function with 4/10/18 AM labs and re-consider whether patient 

needs further IV fluids and when she can restart her home dose bumex diuretic 

for the history of chronic diastolic dysfunction with normal left ventricular 

systolic function





Electrolytes


-on potassium supplements at home due to diuretic use


-admission potassium 5


-hospitalist has ordered Kayexalate as patient's serum potassium on upper 

ranges of normal and patient bradycardic in the ED with heart rate in the low 

50s


-patient to get insulin in the hospital also for diabetes which will also drive 

potassium intracellularly, give albuterol


-bradycardia in the Emergency room, review of medications does not show Beta 

blocker use (cardiology inpatient note in January 2018 discusses history 

junctional arrhythmias when on beta blocker and that chronic recurrent 

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





Cardiac


-History of coronary artery disease, status post percutaneous coronary 

intervention to the right coronary artery in 2002 with residual disease of


the left anterior descending artery and diagonal: continue aspirin


-severe mitral annular calcification as reported in January 2018 echocardiogram


-Diastolic dysfunction with normal left ventricular systolic function - patient 

is not fluid overloaded at this time and in acute renal failure so will hold 

diuretics for now


-bradycardia in the Emergency room, review of medications does not show Beta 

blocker use (cardiology inpatient note in January 2018 discusses history 

junctional arrhythmias when on beta blocker and that chronic recurrent 

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


-consider when to transfer patient off telemetry





Abdomen CT shows Mild pericholecystic/duodenal edema


-no obvious signs of of cholecystis and no antibiotics given for now, hold 

statin


-Gastroenterology does not recommend acute interventions, General surgery also 

has evaluated the patient


-a HIDA scan has been ordered





Diabetes Mellitus on insulin


-simplify home lantus 50 units qAM, lantus 30 units HS as 30 units BID


-short acting insulin as sliding scale coverage





DVT ppx: SCDs

## 2018-04-09 NOTE — CLINICAL DOCUMENTATION QUERY
**** CLINICAL DOCUMENTATION QUERY****



H&P state patient has having CHF, unspecified. Echo from January 2018 showed severe mitral 
annular calcification, diastolic dysfunction with normal LV systolic function.



In your clinical opinion is this patient being managed for:

    

                        ( x ) Chronic diastolic (preserved EF) CHF

                        (  ) Not Agree



                        (  ) Other explanation of clinical findings (Please Explain)

                        (  ) Unable to determine (Please Define)

                        ( ) Need to Discuss

                        



The medical record reflects the following clinical findings, treatment, and risk factors.  
  



Clinical Indicators:  As above.



Treatment: telemetry, I/O's, daily weights, 



Risk Factors: HTN, CKD, CAD



Please clarify and document your clinical opinion in the progress notes and discharge 
summary. Terms such as "probable", "suspected", "likely", "questionable", "possible", or 
"still to be ruled out" are acceptable. 



*****IF IN AGREEMENT, YOU MUST DOCUMENT ABOVE DIAGNOSTIC STATEMENT IN DAILY PROGRESS NOTES 
AND DISCHARGE SUMMARY. This document is not part of the patient's record.*****



Thank You, Graham Sherman, RN  [phone]

## 2018-04-09 NOTE — NEPHROLOGY CONSULTATION
Nephrology Consultation


Date & Providers





Date of Consultation:  Apr 9, 2018.





Primary Care Provider:  Lombardo,Mark, PA-C





Referring Provider:





Reason for Consultation


Acute on chronic kidney disease





History of Present Illness


Ms. Mantilla is a 76 year old white female who is seen at the request of Dr. Stauffer 

for evaluation of acute on chronic kidney injury.  Medical records in the 

hospital EMR were reviewed today and are summarized as follows:  Ms. Mantilla has 

stage IV CKD (advanced impairment) w/ baseline creatinine 2.0.  Her renal 

impairment is due to diabetic nephropathy.  Her medical history is significant 

for AODM, obesity (BMI 47), ASCVD, chronic lymphedema, gout and HTN.  Ms. Mantilla 

was last hospitalized 01/18 with hypertensive urgency.  Her blood pressure was 

controlled following the addition of Amlodipine and Lisinopril to her medical 

regimen.  Ms. Mantilla reports that over the last 2 weeks she has suffered from 

bilateral upper quadrant abdominal discomfort, nausea and oliguria.  She 

presented to the ED yesterday evening where she was found to have DAVEY with 

creatinine 4.1.  Urinalysis revealed granular casts.  Abdominal CT was negative 

for hydronephrosis but did reveal gallbladder wall thickening.





Past Medical/Surgical History


Medical:


Anxiety disorder


Chronic kidney disease, stage IV


Coronary artery disease


Diabetes mellitus 


Lymphedema


Gout


Hyperlipidemia


Hypertension


Hypokalemia


Morbid obesity


Vitamin D deficiency


Surgical:


None reported








Allergies


Coded Allergies:  


     Sulfamethoxazole w/Trimethoprim (Verified  Allergy, Unknown, RASH, 4/8/18)


     Codeine (Verified  Adverse Reaction, Mild, nausea, 4/8/18)





Inpatient Medications





Current Inpatient Medications








 Medications


  (Trade)  Dose


 Ordered  Sig/Will


 Route  Start Time


 Stop Time Status Last Admin


Dose Admin


 


 Aspirin


  (Ecotrin Tab)  81 mg  QAM


 PO  4/9/18 09:00


 5/9/18 08:59  4/9/18 07:48


81 MG


 


 Lorazepam


  (Ativan Tab)  2 mg  HS  PRN


 PO  4/8/18 16:45


 5/8/18 16:44   


 


 


 Glucose


  (Glucose 40% Gel)  15-30


 GRAMS 15


 GRAMS...  UD  PRN


 PO  4/8/18 17:00


 5/8/18 16:59   


 


 


 Glucose


  (Glucose Chew


 Tab)  4-8


 Tablets 4


 Tabl...  UD  PRN


 PO  4/8/18 17:00


 5/8/18 16:59   


 


 


 Dextrose


  (Dextrose 50%


 50ML Syringe)  25-50ML OF


 50% DW IV


 FOR...  UD  PRN


 IV  4/8/18 17:00


 5/8/18 16:59   


 


 


 Glucagon


  (Glucagon Inj)  1 mg  UD  PRN


 SQ  4/8/18 17:00


 5/8/18 16:59   


 


 


 Insulin Glargine


  (Lantus Solostar


 Pen)  30 units  QPM


 SC  4/8/18 21:00


 5/8/18 20:59  4/8/18 21:10


30 UNITS


 


 Sodium Chloride  1,000 ml @ 


 125 mls/hr  Q8H


 IV  4/8/18 19:30


 5/8/18 19:29  4/9/18 03:39


125 MLS/HR


 


 Insulin Glargine


  (Lantus Solostar


 Pen)  30 units  QAM


 SQ  4/10/18 09:00


 5/10/18 08:59   


 


 


 Insulin Aspart


  (novoLOG ASPART)  SLIDING


 SCALE  ACHS


 SC  4/9/18 11:00


 5/9/18 10:59   


 











Family History





Diabetes mellitus


FH: CAD (coronary artery disease)


Hypertension


Stroke


Negative for CKD/ESRD





Social History


Smoking Status:  Never Smoker


Drug Use:  none


Marital Status:  


Housing Status:  lives alone


Occupation:  retired


.  Retired.  Never a smoker





Review of Systems


Constitutional:  No fever


Cardiovascular:  No chest pain


Abdomen:  + pain, + nausea


A complete review of systems was performed.  Pertinent positives are noted 

above. All other systems are negative.





Physical Exam











  Date Time  Temp Pulse Resp B/P (MAP) Pulse Ox O2 Delivery O2 Flow Rate FiO2


 


4/9/18 08:00     95 Room Air  


 


4/9/18 08:00 36.9 78 18 113/66 (82) 94 Room Air  


 


4/9/18 04:34 36.8 74 22 112/73 (86) 95 Room Air  


 


4/9/18 04:00      Room Air  


 


4/8/18 23:59      Room Air  


 


4/8/18 23:59 36.6 73 14 104/39 (60) 96 Room Air  


 


4/8/18 23:59 36.6 73 14 104/39 (60) 96 Room Air  


 


4/8/18 20:00 36.4 71 16 116/69 (85) 97 Room Air  


 


4/8/18 20:00      Room Air  


 


4/8/18 18:46 36.4 72 18 106/45 (65) 98 Room Air  


 


4/8/18 18:28  70 18 132/57 96   


 


4/8/18 17:11  43 16     


 


4/8/18 16:41  44 20     


 


4/8/18 16:14  44 20 103/92 96 Room Air  


 


4/8/18 16:13    103/92    


 


4/8/18 16:11  46 13     


 


4/8/18 15:54     98 Room Air  


 


4/8/18 15:41  47 19     


 


4/8/18 14:41  44 13  95   


 


4/8/18 14:35  51      


 


4/8/18 14:31    115/61    


 


4/8/18 14:30  50 20 115/61 98 Room Air  


 


4/8/18 14:14    113/54    


 


4/8/18 12:44 36.3 56 20 102/57 98   








General Appearance:  no apparent distress


Head:  normocephalic, atraumatic


Eyes:  PERRL, EOMI


Neck:  no adenopathy


Respiratory/Chest:  lungs clear, normal breath sounds


Cardiovascular:  regular rate, rhythm


Abdomen/GI:  soft, + pertinent finding (hypoactive bowel sounds.  No tenderness 

to palpation)


Genitourinary - Female:  + pertinent finding (Stevens catheter draining clear 

yellow urine)


Extremities/Musculoskelatal:  no calf tenderness, + pertinent finding (1+ 

pretibial pitting edema.  Pretibial hemosiderin staining noted)


Neurologic/Psych:  alert, oriented x 3





Laboratory Results





Last 24 Hours








Test


  4/8/18


13:35 4/8/18


14:33 4/8/18


14:55 4/8/18


15:38


 


White Blood Count 7.13 K/uL    


 


Red Blood Count 3.72 M/uL    


 


Hemoglobin 11.9 g/dL    


 


Hematocrit 36.0 %    


 


Mean Corpuscular Volume 96.8 fL    


 


Mean Corpuscular Hemoglobin 32.0 pg    


 


Mean Corpuscular Hemoglobin


Concent 33.1 g/dl 


  


  


  


 


 


Platelet Count 145 K/uL    


 


Mean Platelet Volume 11.8 fL    


 


Neutrophils (%) (Auto) 73.3 %    


 


Lymphocytes (%) (Auto) 17.5 %    


 


Monocytes (%) (Auto) 7.3 %    


 


Eosinophils (%) (Auto) 1.5 %    


 


Basophils (%) (Auto) 0.3 %    


 


Neutrophils # (Auto) 5.22 K/uL    


 


Lymphocytes # (Auto) 1.25 K/uL    


 


Monocytes # (Auto) 0.52 K/uL    


 


Eosinophils # (Auto) 0.11 K/uL    


 


Basophils # (Auto) 0.02 K/uL    


 


RDW Standard Deviation 52.5 fL    


 


RDW Coefficient of Variation 15.0 %    


 


Immature Granulocyte % (Auto) 0.1 %    


 


Immature Granulocyte # (Auto) 0.01 K/uL    


 


Sodium Level 130 mmol/L    


 


Potassium Level  mmol/L   5.0 mmol/L  


 


Chloride Level 96 mmol/L    


 


Carbon Dioxide Level 23 mmol/L    


 


Anion Gap 11.0 mmol/L    17.0 mmol/L 


 


Blood Urea Nitrogen 110 mg/dl    


 


Creatinine 4.15 mg/dl    


 


Est Creatinine Clear Calc


Drug Dose 14.0 ml/min 


  


  


  


 


 


Estimated GFR (


American) 11.3 


  


  


  


 


 


Estimated GFR (Non-


American 9.8 


  


  


  


 


 


BUN/Creatinine Ratio 26.5    


 


Random Glucose 196 mg/dl    


 


Calcium Level 9.2 mg/dl    


 


Magnesium Level  mg/dl   2.4 mg/dl  


 


Total Bilirubin 0.4 mg/dl    


 


Aspartate Amino Transf


(AST/SGOT)  U/L 


  


  14 U/L 


  


 


 


Alanine Aminotransferase


(ALT/SGPT) 23 U/L 


  


  


  


 


 


Alkaline Phosphatase 54 U/L    


 


Troponin I 0.020 ng/ml    


 


Total Protein 7.2 gm/dl    


 


Albumin 3.1 gm/dl    


 


Globulin 4.1 gm/dl    


 


Albumin/Globulin Ratio 0.8    


 


Lipase 87 U/L    


 


Thyroid Stimulating Hormone


(TSH) 1.920 uIu/ml 


  


  


  


 


 


Urine Color  DK YELLOW   


 


Urine Appearance  CLOUDY   


 


Urine pH  5.0   


 


Urine Specific Gravity  1.023   


 


Urine Protein  TRACE   


 


Urine Glucose (UA)  NEG   


 


Urine Ketones  TRACE   


 


Urine Occult Blood  NEG   


 


Urine Nitrite  NEG   


 


Urine Bilirubin  NEG   


 


Urine Urobilinogen  NEG   


 


Urine Leukocyte Esterase  TRACE   


 


Urine WBC (Auto)  1-5 /hpf   


 


Urine RBC (Auto)  5-10 /hpf   


 


Urine Hyaline Casts (Auto)  >30 /lpf   


 


Urine Epithelial Cells (Auto)  20-30 /lpf   


 


Urine Bacteria (Auto)  NEG   


 


Urine Pathogenic Casts


  


  1-5 GRANULAR


CASTS /lpf 


  


 


 


Bedside Hemoglobin    12.2 g/dl 


 


Bedside Hematocrit    36 % 


 


Bedside Sodium    133 mEq/L 


 


Bedside Potassium    4.9 mEq/L 


 


Bedside Chloride    96 mEq/L 


 


Bedside Total CO2    26 mEq/l 


 


Bedside Blood Urea Nitrogen    120 mg/dl 


 


Bedside Creatinine    4.1 mg/dl 


 


Bedside Glucose (other)    163 mg/dl 


 


Bedside Ionized Calcium (Maria E)    1.22 mmol/l 


 


Test


  4/8/18


20:16 4/8/18


21:57 4/9/18


06:42 4/9/18


07:09


 


Bedside Glucose 128 mg/dl   129 mg/dl  


 


Sodium Level  136 mmol/L   137 mmol/L 


 


Potassium Level  3.9 mmol/L   3.8 mmol/L 


 


Chloride Level  100 mmol/L   102 mmol/L 


 


Carbon Dioxide Level  26 mmol/L   25 mmol/L 


 


Anion Gap  10.0 mmol/L   9.0 mmol/L 


 


Blood Urea Nitrogen  100 mg/dl   96 mg/dl 


 


Creatinine  3.35 mg/dl   2.58 mg/dl 


 


Est Creatinine Clear Calc


Drug Dose 


  17.3 ml/min 


  


  22.5 ml/min 


 


 


Estimated GFR (


American) 


  14.7 


  


  20.1 


 


 


Estimated GFR (Non-


American 


  12.7 


  


  17.4 


 


 


BUN/Creatinine Ratio  30.0   37.2 


 


Random Glucose  153 mg/dl   127 mg/dl 


 


Calcium Level  8.9 mg/dl   8.6 mg/dl 


 


Magnesium Level  2.2 mg/dl   


 


Total Bilirubin  0.4 mg/dl   0.4 mg/dl 


 


Aspartate Amino Transf


(AST/SGOT) 


  17 U/L 


  


  18 U/L 


 


 


Alanine Aminotransferase


(ALT/SGPT) 


  18 U/L 


  


  18 U/L 


 


 


Alkaline Phosphatase  49 U/L   48 U/L 


 


Total Protein  6.3 gm/dl   6.0 gm/dl 


 


Albumin  2.9 gm/dl   2.9 gm/dl 


 


Globulin  3.4 gm/dl   3.1 gm/dl 


 


Albumin/Globulin Ratio  0.8   0.9 


 


White Blood Count    5.09 K/uL 


 


Red Blood Count    3.30 M/uL 


 


Hemoglobin    10.3 g/dL 


 


Hematocrit    31.8 % 


 


Mean Corpuscular Volume    96.4 fL 


 


Mean Corpuscular Hemoglobin    31.2 pg 


 


Mean Corpuscular Hemoglobin


Concent 


  


  


  32.4 g/dl 


 


 


Platelet Count    122 K/uL 


 


Mean Platelet Volume    11.1 fL 


 


Neutrophils (%) (Auto)    77.2 % 


 


Lymphocytes (%) (Auto)    11.6 % 


 


Monocytes (%) (Auto)    7.9 % 


 


Eosinophils (%) (Auto)    2.9 % 


 


Basophils (%) (Auto)    0.2 % 


 


Neutrophils # (Auto)    3.93 K/uL 


 


Lymphocytes # (Auto)    0.59 K/uL 


 


Monocytes # (Auto)    0.40 K/uL 


 


Eosinophils # (Auto)    0.15 K/uL 


 


Basophils # (Auto)    0.01 K/uL 


 


RDW Standard Deviation    51.2 fL 


 


RDW Coefficient of Variation    14.8 % 


 


Immature Granulocyte % (Auto)    0.2 % 


 


Immature Granulocyte # (Auto)    0.01 K/uL 











Impression





(1) Acute tubular necrosis


(2) Chronic kidney disease, stage IV (severe)


(3) Morbid obesity


(4) Diabetes mellitus type 2


(5) Benign hypertension


(6) Coronary artery disease


(7) Lymphedema





Clinically suspect acute cholecystitis complicated by dehydration and ATN





Recommendations


ACUTE KIDNEY INJURY:


-- Stop Lisinopril and Amlodipine


-- Kidney function and urine output improving w/ gentle hydration.  No acute 

indication for HD at this time


-- Continue gentle hydration w/ 0.9 NS


-- Monitor serial PRP





CHRONIC KIDNEY DISEASE:


-- Baseline creatinine has been 2.0





ANEMIA:


-- Mild asymptomatic anemia.  Hgb remains stable at 10.3.  Will monitor





GI:


-- Abdominal CT and US reports reviewed this am:  swollen gallbladder 

concerning for acute cholecystitis.  Recommend general surgery consultation and 

HIDA scan

## 2018-04-10 NOTE — CARDIOLOGY CONSULTATION
Cardiology Consultation


Date of Service


Apr 10, 2018.


 (Arely Geiger PA-C)





Cardiology Consultation


Requesting provider: Dr. Lugo


Attending Cardiologist: Dr. Moore








History of Present Illness:  Radha Mantilla is a 76 year old female with 

complex PMH as detailed below. Multiple inpatient and outpatient records 

reviewed. She typically follows wiht M. Lombardo, [facility]. 





She was admitted to [facility] 2 days ago for DAVEY wiht underlying CKD and borderline 

hyperkalemia. On admission EKG she was found to have junctional rhythm with a 

HR of 46 bpm. Her heart rates improved wiht administration of IV hydration, 

improvement in renal function and potassium levels. Since , she has remained 

in NSR without recurrent junctional or bradyarrhythmias.  





Yesterday patient noted worsening of her LE edema. IV fluids were stopped. 

Diuretics remain on hold due to DAVEY. nephrology following and levels back at 

baseline. she denies associated SOB. no chest pain. 





During admission she has also been evaluated for persistent nausea and 

epigastric pain for several weeks.  There were concerns for cholecystis. 

Surgery consulted. Normal hida scan. No evidence of gallstones. 





At time of consult, patient feeling well. She adamantly denies recent episodes 

of dizziness or lightheadedness. No syncope or near syncope. no chest pain or 

SOB. 


No orthopnea, PND.  Notes Le edema since admission with IV fluids and holding 

diuretics.  





Review of systems: 


See above for pertinent positives & negatives. A total of 10 systems reviewed 

and were otherwise negative.





Past Medical History: 


1. ASCVD with NSTEMI in 2002.


Diffuse mild atherosclerotic coronary artery disease by 2002 

diagnostic cardiac catheterization by Dr. Kerwin Owens at [facility].Single discrete high grade obstruction of the mid RCA status 

post PCI at [facility] with a 4.0 x 38 mm Penta bare metal stent. 

Long 60% mid LAD stenosis extending into the origin of a large diagonal branch 

and narrowing it by 50%.


2. Diastolic dysfunction


3. Preserved LV systolic function


4. Hypertension


5. Dyslipidemia with optimal LDL goal of < 70 mg/dL


6. Type II diabetes mellitus with neuropathy


7. Chronic kidney disease followed by Dr. Moreno


8. Recurrent hyperkalemia with resultant junctional bradyarrhythmias. 


9. Obesity


10. History of gout


11. Chart history of anemia


12. Junctional rhythm, bradycardia in setting of hyperkalemia - 


Toprol XL 12.5 mg per day was discontinued in  after the patient 

[facility] following a fall felt to be secondary to over 

diuresis, acute on chronic renal dysfunction, creatinine was 2.8 on 

presentation.  Junctional bradycardia observed in the setting of hyperkalemia.








Past Surgical History:


1. Three prior C-sections


2. Sphincterotomy by Dr. Brown in 2007


3. 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 Ortega.


4. Elective right knee surgery in 2012. Post knee surgery she was 

[place] [facility] [place] [facility] [place] bradyarrhythmias in 

association with acute kidney injury and hyperkalemia that required dopamine 

with arrhythmias primarily responding to treatment of the hyperkalemia. There 

was concern for PE though suspicion [place] [facility] [place] 

peripheral duplexes, no evidence of right heart strain on echo, and negative 

troponin. She was also treated with broad spectrum antibiotics to cover for 

possible sepsis. 





Family History: Mother  with an MI at 68. Father  with a CVA at 69. One 

brother  from complications of diabetes. She has three sisters who are 

alive and well. 





Social History: Nonsmoker. No alcohol. No illegal drugs. . Three 

daughters. 








Review of patient's allergies indicates:


Allergen   Reactions


   Bactrim   Rash


   Codeine   


Nausea.  Other pain meds OK








Intolerance: Felodipine - fluid retention. Imdur - lightheadedness. 








Reported Home Medications








 Medications  Dose


 Route/Sig


 Max Daily Dose Days Date Category


 


 Bumex


  (Bumetanide) 1 Mg


 Tab  1 Tab


 PO HS


   90 18 Reported


 


 Bumex


  (Bumetanide) 1 Mg


 Tab  1.5 Tab


 PO QAM


   90 18 Reported


 


 Norvasc


  (Amlodipine


 Besylate) 5 Mg Tab  5 Mg


 PO DAILY


    18 Reported


 


 Nystop (Nystatin)


 45 Appln/15 Gm


 Powd  1 Appln


 EXT PRN PRN


    18 Rx


 


 Klor-Con M20


  (Potassium


 Chloride) 20 Meq


 Tabcr  20 Meq


 PO BID


    18 Rx


 


 Lisinopril 5 Mg


 Tab  5 Mg


 PO QAM


    18 Rx


 


 Novolog Penfill


  (Insulin Aspart)


 100 Unit/Ml Inj  18 Units


 SQ SUPPER


    17 Reported


 


 Lorazepam 2 Mg Tab  2 Mg


 PO HS PRN


    17 Reported


 


 Neurontin


  (Gabapentin) 300


 Mg Cap  900 Mg


 PO HS


    17 Reported


 


 Lantus Solostar


  (Insulin


 Glargine) 100


 Unit/Ml Inj  30 Units


 SC QPM


    17 Reported


 


 Lantus Solostar


  (Insulin


 Glargine) 100


 Unit/Ml Inj  50 Units


 SQ QAM


    17 Reported


 


 Novolog Penfill


  (Insulin Aspart)


 100 Unit/Ml Inj  16 Units


 SQ LUNCH


    17 Reported


 


 Novolog Penfill


  (Insulin Aspart)


 100 Unit/Ml Inj  14 Units


 SQ QA


    17 Reported


 


 Lipitor


  (Atorvastatin


 Calcium) 80 Mg Tab  80 Mg


 PO QAM


    12/14/15 Reported


 


 Zyloprim


  (Allopurinol) 300


 Mg Tab  300 Mg


 PO QAM


    12 Reported


 


 Ecotrin Or


 Generic (Aspirin)


 81 Mg Tab  81 Mg


 PO QAM


    12 Reported


 


 Neurontin


  (Gabapentin) 300


 Mg Cap  600 Mg


 PO QAM


    12 Reported


 


 Nitrostat


  (Nitroglycerin)


 0.4 Mg Tab  0.4 Mg


 UT PRN


    12 Reported








OBJECTIVE/PHYSICAL EXAMINATION:





Last 8 Hrs








  Date Time  Temp Pulse Resp B/P (MAP) Pulse Ox O2 Delivery O2 Flow Rate FiO2


 


4/10/18 12:12 37.1 88 22 131/76 (94) 95   


 


4/10/18 12:00     93 Room Air  


 


4/10/18 08:00 36.9 72 18 124/58 (80) 96   


 


4/10/18 08:00     93 Room Air  








General: A&Ox3. NAD. Obese. 


HEENT: Normocephalic. Atraumatic. PER. Conjunctiva pink. Sclera clear. 


Neck: No carotid bruits. No overt JVD. 


Heart: Regular at 80 bpm. Soft systolic ejection murmur. No rub. No gallop. PMI 

is nondisplaced. 


Lungs: Diffuse rales anteriorly. No wheeze. No rhonchi


Abdomen: +BS. Soft. Nontender. No masses or organomegaly. 


Extremities: 1-2+ edema suspected though difficult to discern with the legs 

wrapped to the knees. 


Limited neurological examination is without focal deficits. 


Pulses: radial=2/4, posterior tibial pulses were not appreciated. 





Data:


Admission EKG on : 


Junctional rhythm at 46 bpm 


Abnormal ECG


When compared with ECG of 2018 06:34,


Junctional rhythm has replaced Ectopic atrial rhythm


Vent. rate has decreased BY 25 BPM


QT has shortened





repeat EKG on 


Normal sinus rhythm


Normal ECG


When compared with ECG of 2018 14:11,


Sinus rhythm has replaced Junctional rhythm


Vent. rate has increased BY 24 BPM


QT has lengthened





repeat EKG on 4/10: 


Normal sinus rhythm


Normal ECG


When compared with ECG of 2018 06:18,


No significant change was found








Telemetry reviewed in detail - Junctional/bradycardia noted on day of admission 

on . Since that time, no significant bradycardia or junctional rhythm noted. 





Imaging since admission:


Hida Scan: 


IMPRESSION:  Unremarkable nuclear hepatobiliary scan. There is no scintigraphic


evidence of cholecystitis.








Prior DATA: 


2018 TTE Interpretation Summary ([facility], Dr. Jack): No significant 

change compared to previous study of 17. Normal LV chamber size with mild 

concentric LVH, sigmoid appearing septum. Normal LV systolic function, EF 60-65%

. No segmental left ventricular wall motion abnormalities are noted.


Grade I diastolic dysfunction. Aortic valve sclerosis mild, without significant 

aortic valvular stenosis. Mild aortic regurgitation. Severe MAC with mild 

mitral regurgitation. Moderate left atrial enlargement.





2017 TTE Interpretation Summary (as per Dr. Moore): The LV wall 

thickness is moderately increased (concentric). The basal septum is thickened 

and angulated consistent with sigmoid septum. The qualitative LV ejection 

fraction is 55-59% (normal). The right ventricular systolic function is normal 

as assessed by tricuspid annular plane systolic excursion (TAPSE) (normal >1.7 

cm). The left atrium is moderately enlarged. The right atrial size is normal. 

There is severe mitral annular calcification. Mild mitral regurgitation is 

present. Mild aortic valve sclerosis is present. Mild aortic valve 

regurgitation is present. 





2017 Holter (on Toprol XL 12.5 mg/day): Dominant rhythm - Sinus rhythm. 

Average heart rate 64 bpm. Minimum heart rate 45 bpm. Maximum heart rate 116 

bpm. PACs - moderate in frequency. PVCs - Frequent , including 3,400 isolated 

PVCs, 35 ventricular couplets, 3 runs of ventricular bigeminy,  and 2 

ventricular run of 3 beats in duration each with rates of  122 and 136 bpm. 

Symptoms - none reported. 








ASSESSMENT:  76 year old female


1. Junctional rhythm, in setting of DAVEY with borderline hyperkalemia. This was 

noted during prior admissions as well. She had no symptoms at the time of 

arrhythmia. Since renal function/electrolytes have improved, there has been no 

recurrence of bradyarrhythmias on telemetry. 


2. chronic diastolic HF - LE edema noted today without specific 


3. DAVEY with underlying CKD followed by Dr. Moreno. Resume diuretics, when ok 

with nephro.


4. History fo hyperkalemia - monitor closely. 


4. History of CAD - no anginal symptoms. 








RECOMMENDATIONS/PLAN: 


Recommend arranging outpatient ZIO monitor for 2 weeks to monitor for 

junctional bradyarrhythmias or other high degree AV block. 


She is currently asymptomatic.


Resume diuretic therapy when acceptable by nephrology. 


Continue all other cardiac medications. 





Case discussed with Dr. Moore. 











 (Arely Geiger PA-C)


CARDIOLOGY ATTENDING ADDENDUM: 


The patient was seen and personally examined.


Agree with Arely Geiger PA-C's findings and plans as documented above.


The patient had an episode of junctional rhythm when presenting to ED during 

renal failure and electrolyte abnormalities.  No additional arrhythmias after 

admission and correction. At this time I would recommend ZIO monitor after 

discharge.  No pacemaker at this time.


 (Casey Moore, DO)

## 2018-04-10 NOTE — NEPHROLOGY PROGRESS NOTE
Nephrology Progress Note


Date of Service


Apr 10, 2018.





Chief Complaint


Acute on chronic kidney disease





Subjective


Ms. Mantilla was seen and examined in the PCU this morning.  Her daughter was 

present at the time of my evaluation.  Ms. Mantilla denies angina, palpitations or 

dyspnea.  She notes that her legs are again becoming swollen.  IVF have been 

stopped.  She reports good UO without diuretic therapy.





Review of Systems


Constitutional:  No fever


Cardiovascular:  No angina, No palpitations


Respiratory:  No dyspnea at rest


Abdomen:  No pain, No nausea, No vomiting


Extremities:  + leg edema


A complete review of systems was performed.  Pertinent positives are noted 

above. All other systems are negative.





Vital Signs





Last 8 Hrs








  Date Time  Temp Pulse Resp B/P (MAP) Pulse Ox O2 Delivery O2 Flow Rate FiO2


 


4/10/18 08:00 36.9 72 18 124/58 (80) 96   


 


4/10/18 08:00     93 Room Air  


 


4/10/18 04:00      Room Air  


 


4/10/18 03:55 36.8 71 22 142/71 (94) 93 Room Air  











Last Recorded Weight


Weight (Kilograms):  117.000





Physical Exam


General Appearance:  no apparent distress, + obese


Head:  normocephalic, atraumatic


Eyes:  PERRL, EOMI


Neck:  no adenopathy


Respiratory/Chest:  lungs clear, no respiratory distress


Cardiovascular:  regular rate, rhythm


Abdomen/GI:  normal bowel sounds, non tender, soft


Genitourinary - Female:  + pertinent finding (lang catheter in place draining 

clear yellow urine)


Extremities/Musculoskelatal:  + pertinent finding (trace pretibial pitting edema

)


Neurologic/Psych:  alert, oriented x 3





Family History





Diabetes mellitus


FH: CAD (coronary artery disease)


Hypertension


Stroke


Negative for CKD/ESRD





Social History


Smoking Status:  Never smoker


Smokeless Tobacco Use:  No


Alcohol Use:  none


Drug Use:  none


Marital Status:  


Housing Status:  lives alone


Occupation:  retired


.  Retired.  Never a smoker





Laboratory Results


Past 24 Hours


4/10/18 07:22








Red Blood Count 3.27, Mean Corpuscular Volume 96.0, Mean Corpuscular Hemoglobin 

31.8, Mean Corpuscular Hemoglobin Concent 33.1, Mean Platelet Volume 11.4, 

Neutrophils (%) (Auto) 66.7, Lymphocytes (%) (Auto) 19.4, Monocytes (%) (Auto) 

10.1, Eosinophils (%) (Auto) 3.6, Basophils (%) (Auto) 0.2, Neutrophils # (Auto

) 2.78, Lymphocytes # (Auto) 0.81, Monocytes # (Auto) 0.42, Eosinophils # (Auto

) 0.15, Basophils # (Auto) 0.01





4/10/18 07:22

















Test


  4/9/18


11:29 4/9/18


16:24 4/9/18


21:29 4/10/18


07:22


 


Bedside Glucose


  172 mg/dl


(70-90) 170 mg/dl


(70-90) 174 mg/dl


(70-90) 


 


 


White Blood Count


  


  


  


  4.17 K/uL


(4.8-10.8)


 


Red Blood Count


  


  


  


  3.27 M/uL


(4.2-5.4)


 


Hemoglobin


  


  


  


  10.4 g/dL


(12.0-16.0)


 


Hematocrit    31.4 % (37-47) 


 


Mean Corpuscular Volume


  


  


  


  96.0 fL


()


 


Mean Corpuscular Hemoglobin


  


  


  


  31.8 pg


(25-34)


 


Mean Corpuscular Hemoglobin


Concent 


  


  


  33.1 g/dl


(32-36)


 


Platelet Count


  


  


  


  132 K/uL


(130-400)


 


Mean Platelet Volume


  


  


  


  11.4 fL


(7.4-10.4)


 


Neutrophils (%) (Auto)    66.7 % 


 


Lymphocytes (%) (Auto)    19.4 % 


 


Monocytes (%) (Auto)    10.1 % 


 


Eosinophils (%) (Auto)    3.6 % 


 


Basophils (%) (Auto)    0.2 % 


 


Neutrophils # (Auto)


  


  


  


  2.78 K/uL


(1.4-6.5)


 


Lymphocytes # (Auto)


  


  


  


  0.81 K/uL


(1.2-3.4)


 


Monocytes # (Auto)


  


  


  


  0.42 K/uL


(0.11-0.59)


 


Eosinophils # (Auto)


  


  


  


  0.15 K/uL


(0-0.5)


 


Basophils # (Auto)


  


  


  


  0.01 K/uL


(0-0.2)


 


RDW Standard Deviation


  


  


  


  51.7 fL


(36.4-46.3)


 


RDW Coefficient of Variation


  


  


  


  15.0 %


(11.5-14.5)


 


Immature Granulocyte % (Auto)    0.0 % 


 


Immature Granulocyte # (Auto)


  


  


  


  0.00 K/uL


(0.00-0.02)


 


Anion Gap


  


  


  


  7.0 mmol/L


(3-11)


 


Est Creatinine Clear Calc


Drug Dose 


  


  


  34.8 ml/min 


 


 


Estimated GFR (


American) 


  


  


  34.1 


 


 


Estimated GFR (Non-


American 


  


  


  29.4 


 


 


BUN/Creatinine Ratio    42.9 (10-20) 


 


Calcium Level


  


  


  


  9.0 mg/dl


(8.5-10.1)


 


Total Bilirubin


  


  


  


  0.5 mg/dl


(0.2-1)


 


Aspartate Amino Transf


(AST/SGOT) 


  


  


  16 U/L (15-37) 


 


 


Alanine Aminotransferase


(ALT/SGPT) 


  


  


  18 U/L (12-78) 


 


 


Alkaline Phosphatase


  


  


  


  51 U/L


()


 


Total Protein


  


  


  


  6.2 gm/dl


(6.4-8.2)


 


Albumin


  


  


  


  2.8 gm/dl


(3.4-5.0)


 


Globulin


  


  


  


  3.4 gm/dl


(2.5-4.0)


 


Albumin/Globulin Ratio    0.8 (0.9-2) 











Allergies


Coded Allergies:  


     Sulfamethoxazole w/Trimethoprim (Verified  Allergy, Unknown, RASH, 4/8/18)


     Codeine (Verified  Adverse Reaction, Mild, nausea, 4/8/18)





Medications





Current Inpatient Medications








 Medications


  (Trade)  Dose


 Ordered  Sig/Will


 Route  Start Time


 Stop Time Status Last Admin


Dose Admin


 


 Aspirin


  (Ecotrin Tab)  81 mg  QAM


 PO  4/9/18 09:00


 5/9/18 08:59  4/10/18 08:13


81 MG


 


 Lorazepam


  (Ativan Tab)  2 mg  HS  PRN


 PO  4/8/18 16:45


 5/8/18 16:44   


 


 


 Glucose


  (Glucose 40% Gel)  15-30


 GRAMS 15


 GRAMS...  UD  PRN


 PO  4/8/18 17:00


 5/8/18 16:59   


 


 


 Glucose


  (Glucose Chew


 Tab)  4-8


 Tablets 4


 Tabl...  UD  PRN


 PO  4/8/18 17:00


 5/8/18 16:59   


 


 


 Dextrose


  (Dextrose 50%


 50ML Syringe)  25-50ML OF


 50% DW IV


 FOR...  UD  PRN


 IV  4/8/18 17:00


 5/8/18 16:59   


 


 


 Glucagon


  (Glucagon Inj)  1 mg  UD  PRN


 SQ  4/8/18 17:00


 5/8/18 16:59   


 


 


 Insulin Glargine


  (Lantus Solostar


 Pen)  30 units  QPM


 SC  4/8/18 21:00


 5/8/18 20:59  4/9/18 21:40


30 UNITS


 


 Insulin Glargine


  (Lantus Solostar


 Pen)  30 units  QAM


 SQ  4/10/18 09:00


 5/10/18 08:59  4/10/18 09:11


30 UNITS


 


 Insulin Aspart


  (novoLOG ASPART)  SLIDING


 SCALE  ACHS


 SC  4/9/18 11:00


 5/9/18 10:59  4/10/18 09:10


5 UNITS


 


 Acetaminophen


  (Tylenol Tab)  1,000 mg  Q8H  PRN


 PO  4/9/18 22:00


 5/9/18 21:59  4/9/18 23:07


1,000 MG


 


 Ondansetron HCl


  (Zofran Inj)  4 mg  Q6H  PRN


 IV  4/10/18 04:45


 5/10/18 04:44  4/10/18 04:44


4 MG


 


 Enoxaparin Sodium


  (Lovenox Inj)  40 mg  HS


 SQ  4/10/18 21:00


 5/10/18 20:59   


 











Impression





(1) Acute tubular necrosis


(2) Chronic kidney disease, stage IV (severe)


(3) Morbid obesity


(4) Diabetes mellitus type 2


(5) Benign hypertension


(6) Coronary artery disease


(7) Lymphedema





ATN due to dehydration in the setting of ACE inhibitor therapy


HIDA scan was negative for obstruction of GB





Recommendations


ACUTE KIDNEY INJURY:


-- Resolved.


-- Will d/c Lang catheter





CHRONIC KIDNEY DISEASE:


-- Baseline creatinine has been 2.0





HYPERTENSION:


-- Blood pressure is relatively low.  Continue to hold Amlodipine


-- Avoid Lisinopril due to recent DAVEY





EDEMA:


-- Patient had been on Bumex 1 mg 1/2 tablet po BID as an outpatient


-- IVF has been stopped.  Hold diuretic as patient is diuresing well on her own





ANEMIA:


-- Mild asymptomatic anemia.  Hgb remains stable at 10.3.  Will monitor





GI:


-- HIDA scan reviewed this am.  No GB obstruction





CV:


-- Patient admitted w/ relative bradycardia.  Awaiting Cardiology input

## 2018-04-10 NOTE — SURGERY PROGRESS NOTE
Surgery Progress Note


Date of Service


Apr 10, 2018.





Subjective


+ feeling well


pt is doing better, pt denies abdominal pain,no nausea, no vomiting,





Objective


Vital Signs:











  Date Time  Temp Pulse Resp B/P (MAP) Pulse Ox O2 Delivery O2 Flow Rate FiO2


 


4/10/18 08:00 36.9 72 18 124/58 (80) 96   


 


4/10/18 08:00     93 Room Air  


 


4/10/18 04:00      Room Air  


 


4/10/18 03:55 36.8 71 22 142/71 (94) 93 Room Air  


 


4/10/18 00:00      Room Air  


 


4/9/18 23:50 36.9 77 18 123/67 (85) 94 Room Air  


 


4/9/18 20:00      Room Air  


 


4/9/18 19:45 36.7 77 22 152/60 (90) 97 Room Air  


 


4/9/18 16:00      Room Air  


 


4/9/18 15:16 36.7 74 22 138/54 (82) 97 Room Air  


 


4/9/18 12:00     95 Room Air  


 


4/9/18 11:48 37.1 86 20 115/69 (84) 99   








General Appearance:  WD/WN, no apparent distress


Head:  normocephalic


Neck:  supple, no JVD


Respiratory/Chest:  chest non-tender, lungs clear


Cardiovascular:  regular rate, rhythm, no edema, no JVD, no murmur


Abdomen:  normal bowel sounds, non tender, non distended, soft, no organomegaly


Extremities:  normal range of motion, non-tender, normal inspection


Laboratory Results:





Results Past 24 Hours








Test


  4/9/18


11:29 4/9/18


16:24 4/9/18


21:29 4/10/18


07:22 Range/Units


 


 


Bedside Glucose 172 170 174  70-90  mg/dl


 


White Blood Count    4.17 4.8-10.8  K/uL


 


Red Blood Count    3.27 4.2-5.4  M/uL


 


Hemoglobin    10.4 12.0-16.0  g/dL


 


Hematocrit    31.4 37-47  %


 


Mean Corpuscular Volume    96.0   fL


 


Mean Corpuscular Hemoglobin    31.8 25-34  pg


 


Mean Corpuscular Hemoglobin


Concent 


  


  


  33.1


  32-36  g/dl


 


 


Platelet Count    132 130-400  K/uL


 


Mean Platelet Volume    11.4 7.4-10.4  fL


 


Neutrophils (%) (Auto)    66.7  %


 


Lymphocytes (%) (Auto)    19.4  %


 


Monocytes (%) (Auto)    10.1  %


 


Eosinophils (%) (Auto)    3.6  %


 


Basophils (%) (Auto)    0.2  %


 


Neutrophils # (Auto)    2.78 1.4-6.5  K/uL


 


Lymphocytes # (Auto)    0.81 1.2-3.4  K/uL


 


Monocytes # (Auto)    0.42 0.11-0.59  K/uL


 


Eosinophils # (Auto)    0.15 0-0.5  K/uL


 


Basophils # (Auto)    0.01 0-0.2  K/uL


 


RDW Standard Deviation    51.7 36.4-46.3  fL


 


RDW Coefficient of Variation    15.0 11.5-14.5  %


 


Immature Granulocyte % (Auto)    0.0  %


 


Immature Granulocyte # (Auto)    0.00 0.00-0.02  K/uL


 


Sodium Level    142 136-145  mmol/L


 


Potassium Level    3.9 3.5-5.1  mmol/L


 


Chloride Level    108   mmol/L


 


Carbon Dioxide Level    27 21-32  mmol/L


 


Anion Gap    7.0 3-11  mmol/L


 


Blood Urea Nitrogen    72 7-18  mg/dl


 


Creatinine


  


  


  


  1.67


  0.60-1.20


mg/dl


 


Est Creatinine Clear Calc


Drug Dose 


  


  


  34.8


   ml/min


 


 


Estimated GFR (


American) 


  


  


  34.1


   


 


 


Estimated GFR (Non-


American 


  


  


  29.4


   


 


 


BUN/Creatinine Ratio    42.9 10-20  


 


Random Glucose    144 70-99  mg/dl


 


Calcium Level    9.0 8.5-10.1  mg/dl


 


Total Bilirubin    0.5 0.2-1  mg/dl


 


Aspartate Amino Transf


(AST/SGOT) 


  


  


  16


  15-37  U/L


 


 


Alanine Aminotransferase


(ALT/SGPT) 


  


  


  18


  12-78  U/L


 


 


Alkaline Phosphatase    51   U/L


 


Total Protein    6.2 6.4-8.2  gm/dl


 


Albumin    2.8 3.4-5.0  gm/dl


 


Globulin    3.4 2.5-4.0  gm/dl


 


Albumin/Globulin Ratio    0.8 0.9-2  











Assessment & Plan


HIDA scan-FINDINGS: The hepatobiliary scan shows prompt and homogeneous hepatic 

uptake.


There is visualized activity within the intra and extrahepatic biliary tree at 


10 minutes, and within the gallbladder at 15 minutes. There is normal biliary to


bowel transit, with small bowel visualized by 45 minutes.








IMPRESSION:  Unremarkable nuclear hepatobiliary scan. There is no scintigraphic


evidence of cholecystitis.





base on pt has no symptoms, no indication for cholecystectomy,


I sign off today, please call if any questions or concern, 


F/U me in 2 weeks [phone]

## 2018-04-10 NOTE — PROGRESS NOTE
Medicine Progress Note


Date & Time of Visit:


Apr 10, 2018 at 09:08


.


Subjective





Feels better and hoping to go home soon.


Has some epigastric discomfort last night; did not take any nitroglycerin or 

antacids.


Intermittent dyspnea.


Worsening dependent edema since diuretics held; hands feel puffy as well.


No nausea or vomiting.


Still has Shah catheter.


Daughter visiting.


.





Objective





Last 8 Hrs








  Date Time  Temp Pulse Resp B/P (MAP) Pulse Ox O2 Delivery O2 Flow Rate FiO2


 


4/10/18 08:00     93 Room Air  


 


4/10/18 04:00      Room Air  


 


4/10/18 03:55 36.8 71 22 142/71 (94) 93 Room Air  








Physical Exam:





General- sitting in chair at bedside; no distress


Lungs- clear to auscultation; no respiratory distress


Cardiovascular- RRR; II/VI systolic murmur LSB; no gallop appreciated; + JVD; 3

+ pretibial edema


Abdomen- + bowel sounds, soft, nontender 


Extremities- no cyanosis; no calf tenderness 


Neuro- alert, oriented


Skin- warm & dry


.


Laboratory Results:





Last 24 Hours








Test


  4/9/18


11:29 4/9/18


16:24 4/9/18


21:29 4/10/18


07:22


 


Bedside Glucose 172 mg/dl  170 mg/dl  174 mg/dl  


 


White Blood Count    4.17 K/uL 


 


Red Blood Count    3.27 M/uL 


 


Hemoglobin    10.4 g/dL 


 


Hematocrit    31.4 % 


 


Mean Corpuscular Volume    96.0 fL 


 


Mean Corpuscular Hemoglobin    31.8 pg 


 


Mean Corpuscular Hemoglobin


Concent 


  


  


  33.1 g/dl 


 


 


Platelet Count    132 K/uL 


 


Mean Platelet Volume    11.4 fL 


 


Neutrophils (%) (Auto)    66.7 % 


 


Lymphocytes (%) (Auto)    19.4 % 


 


Monocytes (%) (Auto)    10.1 % 


 


Eosinophils (%) (Auto)    3.6 % 


 


Basophils (%) (Auto)    0.2 % 


 


Neutrophils # (Auto)    2.78 K/uL 


 


Lymphocytes # (Auto)    0.81 K/uL 


 


Monocytes # (Auto)    0.42 K/uL 


 


Eosinophils # (Auto)    0.15 K/uL 


 


Basophils # (Auto)    0.01 K/uL 


 


RDW Standard Deviation    51.7 fL 


 


RDW Coefficient of Variation    15.0 % 


 


Immature Granulocyte % (Auto)    0.0 % 


 


Immature Granulocyte # (Auto)    0.00 K/uL 


 


Sodium Level    142 mmol/L 


 


Potassium Level    3.9 mmol/L 


 


Chloride Level    108 mmol/L 


 


Carbon Dioxide Level    27 mmol/L 


 


Anion Gap    7.0 mmol/L 


 


Blood Urea Nitrogen    72 mg/dl 


 


Creatinine    1.67 mg/dl 


 


Est Creatinine Clear Calc


Drug Dose 


  


  


  34.8 ml/min 


 


 


Estimated GFR (


American) 


  


  


  34.1 


 


 


Estimated GFR (Non-


American 


  


  


  29.4 


 


 


BUN/Creatinine Ratio    42.9 


 


Random Glucose    144 mg/dl 


 


Calcium Level    9.0 mg/dl 


 


Total Bilirubin    0.5 mg/dl 


 


Aspartate Amino Transf


(AST/SGOT) 


  


  


  16 U/L 


 


 


Alanine Aminotransferase


(ALT/SGPT) 


  


  


  18 U/L 


 


 


Alkaline Phosphatase    51 U/L 


 


Total Protein    6.2 gm/dl 


 


Albumin    2.8 gm/dl 


 


Globulin    3.4 gm/dl 


 


Albumin/Globulin Ratio    0.8 











Assessment & Plan





CORONARY ARTERY DISEASE


History of coronary artery disease, status post PCI of right coronary artery in 

2002.


Experiencing epigastric discomfort and dyspnea which may or may not represent 

myocardial ischemia.


Initial EKG showed junctional rhythm in the 40s associated with borderline 

hyperkalemia; subsequently in sinus rhythm.


Troponin at time of admission was 0.02.


Continue aspirin.


Not on beta-blocker secondary to junctional rhythms.


Continue statin.


Consult Cardiology for their input.





PROBABLE CHF


Chronic lower extremity edema.


Chest x-ray showed cardiomegaly, mild interstitial prominence (?CHF).


Echocardiogram performed on 1/26/AT demonstrated mild concentric LVH, normal 

left ventricular wall motion and function with LVEF of 60-65%, grade 1 

diastolic dysfunction, mild mitral regurgitation, normal right ventricular 

dimensions and systolic function.


May have underlying sleep apnea and/or obesity hypoventilation syndrome.


May have chronic left ventricular diastolic heart failure, perhaps with a 

component of right-sided failure as well.


Diuretics on hold due to acute kidney injury.


Follow exam, weights, labs and titrate diuretic therapy.





JUNCTIONAL BRADYCARDIA


Initial EKG showed junctional rhythm in the 40s associated with borderline 

hyperkalemia; subsequently in sinus rhythm.


Prior history of junctional rhythm while taking beta-blocker.


Patient is currently in sinus rhythm, but at risk for recurrent bradycardia 

arrhythmias.


Consult Cardiology for their input.





POSSIBLE SLEEP APNEA


Patient has advised to have outpatient polysomnography performed in the past, 

but has declined because she does not want to wear CPAP or BiPAP.


Importance of diagnosing and treating sleep apnea and/or obesity 

hypoventilation syndrome discussed.





ACUTE KIDNEY INJURY / CHRONIC KIDNEY DISEASE STAGE IV


CKD IV with baseline creatinine of 2, followed by Dr. Moreno.


Serum creatinine on admission was 4.15.


Nephrology consulted.


Diuretics held.


Serum creatinine today = 1.67.


Resume diuretic therapy when able.





SHAH CATHETER


Shah catheter inserted for monitoring urine output.


Remove when able.





?  HEPATOBILIARY DISEASE


Patient experiencing intermittent epigastric discomfort and abdominal bloating.


LFTs and lipase were normal at the time of admission.


General Surgery and Gastroenterology consulted.


CT of abdomen and pelvis showed mild pericholecystic/duodenal edema, no 

apparent cholelithiasis or choledocholithiasis, no bowel obstruction or free 

air.


Ultrasound of abdomen demonstrated thickening of gallbladder wall, no 

cholelithiasis or choledocholithiasis, trace pericholecystic fluid, no ductal 

dilatation, mild fullness of left renal pelvis.


HIDA scan did not show any evidence of cholecystitis.





DM TYPE 2


History of diabetes mellitus type 2 on insulin therapy.


Random glucose of the time of admission was 196.


Continue Lantus / NovoLog per protocol.





MORBID OBESITY


Weight 117 kg, BMI 47.


AHA, diabetic diet.





VTE PROPHYLAXIS


SCD's ordered.


Add enoxaparin if no invasive procedures are planned.


Ambulate.





DISPOSITION


Expected discharge to home.


Family Medicine follow-up with Dr. Marcos.


Cardiology follow-up with Mark Lombardo, PA-C.


Nephrology follow-up with Dr. Moreno.





Daughter visiting and given update.


.


Current Inpatient Medications:





Current Inpatient Medications








 Medications


  (Trade)  Dose


 Ordered  Sig/Will


 Route  Start Time


 Stop Time Status Last Admin


Dose Admin


 


 Aspirin


  (Ecotrin Tab)  81 mg  QAM


 PO  4/9/18 09:00


 5/9/18 08:59  4/10/18 08:13


81 MG


 


 Lorazepam


  (Ativan Tab)  2 mg  HS  PRN


 PO  4/8/18 16:45


 5/8/18 16:44   


 


 


 Glucose


  (Glucose 40% Gel)  15-30


 GRAMS 15


 GRAMS...  UD  PRN


 PO  4/8/18 17:00


 5/8/18 16:59   


 


 


 Glucose


  (Glucose Chew


 Tab)  4-8


 Tablets 4


 Tabl...  UD  PRN


 PO  4/8/18 17:00


 5/8/18 16:59   


 


 


 Dextrose


  (Dextrose 50%


 50ML Syringe)  25-50ML OF


 50% DW IV


 FOR...  UD  PRN


 IV  4/8/18 17:00


 5/8/18 16:59   


 


 


 Glucagon


  (Glucagon Inj)  1 mg  UD  PRN


 SQ  4/8/18 17:00


 5/8/18 16:59   


 


 


 Insulin Glargine


  (Lantus Solostar


 Pen)  30 units  QPM


 SC  4/8/18 21:00


 5/8/18 20:59  4/9/18 21:40


30 UNITS


 


 Insulin Glargine


  (Lantus Solostar


 Pen)  30 units  QAM


 SQ  4/10/18 09:00


 5/10/18 08:59   


 


 


 Insulin Aspart


  (novoLOG ASPART)  SLIDING


 SCALE  ACHS


 SC  4/9/18 11:00


 5/9/18 10:59  4/9/18 21:40


2 UNITS


 


 Acetaminophen


  (Tylenol Tab)  1,000 mg  Q8H  PRN


 PO  4/9/18 22:00


 5/9/18 21:59  4/9/18 23:07


1,000 MG


 


 Ondansetron HCl


  (Zofran Inj)  4 mg  Q6H  PRN


 IV  4/10/18 04:45


 5/10/18 04:44  4/10/18 04:44


4 MG

## 2018-04-10 NOTE — GASTROENTEROLOGY PROGRESS NOTE
Progress Note


Date of Service:  Apr 10, 2018


Subjective


Pt evaluation today including:  conversation w/ patient, conversation w/ family

, chart review, lab review


Pt was seen and evaluated, chart reviewed. No acute events overnight. Did have 

some abd pain last evening, now resolving and mild again. Mild nausea, no 

vomiting. Tolerated 100% breakfast. No fever, chills, CP, SOB. 





HIDA 4/9/18: Unremarkable nuclear hepatobiliary scan. There is no scintigraphic 

evidence of cholecystitis.


ABD US 4/8/18: Gallbladder wall thickening/edema, measuring up to 1 cm. No 

gallstones identified Trace pericholecystic fluid No evidence of ductal 

dilatation. Mild fullness of the left renal pelvis


CT ABD 4/8/18: No evidence of bowel obstruction. No evidence of free air Mild 

pericholecystic/duodenal edema. Clinical correlation in regards to acute 

cholecystitis or duodenitis is recommended. Biliary ultrasonography might be 

considered in follow-up. Alternatively a nuclear medicine hepatobiliary scan 

could be obtained to assess cystic duct patency.





Review of Systems


Constitutional:  No fever


Respiratory:  No cough, No shortness of breath


Cardiac:  No chest pain, No edema


Abdomen:  + pain, + nausea, No vomiting, No diarrhea, No constipation, No GI 

bleeding





Medications





Current Inpatient Medications








 Medications


  (Trade)  Dose


 Ordered  Sig/Will


 Route  Start Time


 Stop Time Status Last Admin


Dose Admin


 


 Aspirin


  (Ecotrin Tab)  81 mg  QAM


 PO  4/9/18 09:00


 5/9/18 08:59  4/10/18 08:13


81 MG


 


 Lorazepam


  (Ativan Tab)  2 mg  HS  PRN


 PO  4/8/18 16:45


 5/8/18 16:44   


 


 


 Glucose


  (Glucose 40% Gel)  15-30


 GRAMS 15


 GRAMS...  UD  PRN


 PO  4/8/18 17:00


 5/8/18 16:59   


 


 


 Glucose


  (Glucose Chew


 Tab)  4-8


 Tablets 4


 Tabl...  UD  PRN


 PO  4/8/18 17:00


 5/8/18 16:59   


 


 


 Dextrose


  (Dextrose 50%


 50ML Syringe)  25-50ML OF


 50% DW IV


 FOR...  UD  PRN


 IV  4/8/18 17:00


 5/8/18 16:59   


 


 


 Glucagon


  (Glucagon Inj)  1 mg  UD  PRN


 SQ  4/8/18 17:00


 5/8/18 16:59   


 


 


 Insulin Glargine


  (Lantus Solostar


 Pen)  30 units  QPM


 SC  4/8/18 21:00


 5/8/18 20:59  4/9/18 21:40


30 UNITS


 


 Insulin Glargine


  (Lantus Solostar


 Pen)  30 units  QAM


 SQ  4/10/18 09:00


 5/10/18 08:59   


 


 


 Insulin Aspart


  (novoLOG ASPART)  SLIDING


 SCALE  ACHS


 SC  4/9/18 11:00


 5/9/18 10:59  4/9/18 21:40


2 UNITS


 


 Acetaminophen


  (Tylenol Tab)  1,000 mg  Q8H  PRN


 PO  4/9/18 22:00


 5/9/18 21:59  4/9/18 23:07


1,000 MG


 


 Ondansetron HCl


  (Zofran Inj)  4 mg  Q6H  PRN


 IV  4/10/18 04:45


 5/10/18 04:44  4/10/18 04:44


4 MG











Objective


Vital Signs











  Date Time  Temp Pulse Resp B/P (MAP) Pulse Ox O2 Delivery O2 Flow Rate FiO2


 


4/10/18 04:00      Room Air  


 


4/10/18 03:55 36.8 71 22 142/71 (94) 93 Room Air  


 


4/10/18 00:00      Room Air  


 


4/9/18 23:50 36.9 77 18 123/67 (85) 94 Room Air  


 


4/9/18 20:00      Room Air  


 


4/9/18 19:45 36.7 77 22 152/60 (90) 97 Room Air  


 


4/9/18 16:00      Room Air  


 


4/9/18 15:16 36.7 74 22 138/54 (82) 97 Room Air  


 


4/9/18 12:00     95 Room Air  


 


4/9/18 11:48 37.1 86 20 115/69 (84) 99   











Physical Exam


General Appearance:  no apparent distress


Eyes:  PERRL


ENT:  hearing grossly normal


Neck:  supple, trachea midline


Respiratory/Chest:  lungs clear, normal breath sounds


Cardiovascular:  regular rate, rhythm


Abdomen:  soft, no organomegaly, no pulsatile mass, + tenderness (mild upper 

abdominal tenderness)


Neurologic/Psych:  alert, normal mood/affect, oriented x 3


Skin:  normal color





Laboratory Results





Last 24 Hours








Test


  4/9/18


11:29 4/9/18


16:24 4/9/18


21:29 4/10/18


07:22


 


Bedside Glucose 172 mg/dl  170 mg/dl  174 mg/dl  


 


White Blood Count    4.17 K/uL 


 


Red Blood Count    3.27 M/uL 


 


Hemoglobin    10.4 g/dL 


 


Hematocrit    31.4 % 


 


Mean Corpuscular Volume    96.0 fL 


 


Mean Corpuscular Hemoglobin    31.8 pg 


 


Mean Corpuscular Hemoglobin


Concent 


  


  


  33.1 g/dl 


 


 


Platelet Count    132 K/uL 


 


Mean Platelet Volume    11.4 fL 


 


Neutrophils (%) (Auto)    66.7 % 


 


Lymphocytes (%) (Auto)    19.4 % 


 


Monocytes (%) (Auto)    10.1 % 


 


Eosinophils (%) (Auto)    3.6 % 


 


Basophils (%) (Auto)    0.2 % 


 


Neutrophils # (Auto)    2.78 K/uL 


 


Lymphocytes # (Auto)    0.81 K/uL 


 


Monocytes # (Auto)    0.42 K/uL 


 


Eosinophils # (Auto)    0.15 K/uL 


 


Basophils # (Auto)    0.01 K/uL 


 


RDW Standard Deviation    51.7 fL 


 


RDW Coefficient of Variation    15.0 % 


 


Immature Granulocyte % (Auto)    0.0 % 


 


Immature Granulocyte # (Auto)    0.00 K/uL 


 


Sodium Level    142 mmol/L 


 


Potassium Level    3.9 mmol/L 


 


Chloride Level    108 mmol/L 


 


Carbon Dioxide Level    27 mmol/L 


 


Anion Gap    7.0 mmol/L 


 


Blood Urea Nitrogen    72 mg/dl 


 


Creatinine    1.67 mg/dl 


 


Est Creatinine Clear Calc


Drug Dose 


  


  


  34.8 ml/min 


 


 


Estimated GFR (


American) 


  


  


  34.1 


 


 


Estimated GFR (Non-


American 


  


  


  29.4 


 


 


BUN/Creatinine Ratio    42.9 


 


Random Glucose    144 mg/dl 


 


Calcium Level    9.0 mg/dl 


 


Total Bilirubin    0.5 mg/dl 


 


Aspartate Amino Transf


(AST/SGOT) 


  


  


  16 U/L 


 


 


Alanine Aminotransferase


(ALT/SGPT) 


  


  


  18 U/L 


 


 


Alkaline Phosphatase    51 U/L 


 


Total Protein    6.2 gm/dl 


 


Albumin    2.8 gm/dl 


 


Globulin    3.4 gm/dl 


 


Albumin/Globulin Ratio    0.8 











Assessment and Plan


Patient is a 76 year old female w/ abd fullness x 2 weeks, decreased urine 

output x 48 hours admitted through the ED for DAVEY on CKD-V. CT imaging w/ mild


pericholecystic/periduodenal edema. LFTs and lipase non-elevated, concern for 

acute cholecystitis. HIDA negative. General surgery was consulted in addition 

who feel she has chronic cholecystitis. She does not wish to undergo further 

testing or invasive procedures at this point unless urgently indicated. Kidney 

function slowly improving. 





- LFTs, Lipase normal


- Can arrange OP EGD to rule out duodenitis if pt wishes


- No GI role for ABX therapy


- DAVEY on CKD per primary team 





- GI to sign off, please call with any questions or concerns.

## 2018-04-11 NOTE — PROGRESS NOTE
Medicine Progress Note


Date & Time of Visit:


Apr 11, 2018 at 10:40


.


Subjective





Feels better.


No further epigastric discomfort.


No nausea vomiting.


Afebrile.


No chest pain.


No shortness of breath.


Stevens catheter removed.


.





Objective





Last 8 Hrs








  Date Time  Temp Pulse Resp B/P (MAP) Pulse Ox O2 Delivery O2 Flow Rate FiO2


 


4/11/18 20:19 36.7 70 18 147/74 (98) 98 Room Air  


 


4/11/18 16:10     98 Room Air  


 


4/11/18 15:51 36.7 73 18 152/57 (88) 98 Room Air  








Physical Exam:





General- sitting in chair at bedside; no distress


Lungs- clear to auscultation; no respiratory distress


Cardiovascular- RRR; II/VI systolic murmur LSB; no gallop appreciated; + JVD; 3

+ pretibial edema


Abdomen- + bowel sounds, soft, nontender 


Extremities- no cyanosis; no calf tenderness 


Neuro- alert, oriented


Skin- warm & dry


.


Laboratory Results:





Last 24 Hours








Test


  4/11/18


07:11 4/11/18


07:55 4/11/18


11:42 4/11/18


16:33


 


Bedside Glucose 113 mg/dl   106 mg/dl  104 mg/dl 


 


Sodium Level  143 mmol/L   


 


Potassium Level  4.2 mmol/L   


 


Chloride Level  109 mmol/L   


 


Carbon Dioxide Level  28 mmol/L   


 


Anion Gap  6.0 mmol/L   


 


Blood Urea Nitrogen  56 mg/dl   


 


Creatinine  1.56 mg/dl   


 


Est Creatinine Clear Calc


Drug Dose 


  36.9 ml/min 


  


  


 


 


Estimated GFR (


American) 


  37.0 


  


  


 


 


Estimated GFR (Non-


American 


  31.9 


  


  


 


 


BUN/Creatinine Ratio  35.7   


 


Random Glucose  126 mg/dl   


 


Estimated Average Glucose  148 mg/dl   


 


Hemoglobin A1c  6.8 %   


 


Calcium Level  9.5 mg/dl   


 


Triglycerides Level  119 mg/dl   


 


Cholesterol Level  107 mg/dl   


 


HDL Cholesterol  40 mg/dl   


 


LDL Cholesterol, Calculated  43 mg/dl   


 


VLDL Cholesterol, Calculated  24 mg/dl   


 


Cholesterol/HDL Ratio  2.7   


 


Test


  4/11/18


20:14 


  


  


 


 


Bedside Glucose 135 mg/dl    











Assessment & Plan





ACUTE KIDNEY INJURY / CHRONIC KIDNEY DISEASE STAGE IV


CKD IV with baseline creatinine of 2, followed by Dr. Moreno.


Serum creatinine on admission was 4.15.


Nephrology consulted.


Diuretics held.


Serum creatinine today = 1.56.


Resumed diuretic therapy.


Avoid NSAID's.


Follow.





CORONARY ARTERY DISEASE


History of coronary artery disease, status post PCI of right coronary artery in 

2002.


Was experiencing epigastric discomfort and dyspnea which may or may not 

represent myocardial ischemia.


Initial EKG showed junctional rhythm in the 40s associated with borderline 

hyperkalemia; subsequently in sinus rhythm.


Troponin at time of admission was 0.02.


Continue aspirin.


Not on beta-blocker secondary to junctional rhythms.


Continue statin.





PROBABLE CHF


Chest x-ray showed cardiomegaly, mild interstitial prominence (?CHF).


Echocardiogram performed on 1/26/18 demonstrated mild concentric LVH, normal 

left ventricular wall motion and function with LVEF of 60-65%, grade 1 

diastolic dysfunction, mild mitral regurgitation, normal right ventricular 

dimensions and systolic function.


May have underlying sleep apnea and/or obesity hypoventilation syndrome.


Probable chronic left ventricular diastolic heart failure, perhaps with a 

component of right-sided failure as well.


Diuretics held due to acute kidney injury.


Renal function improved and diuretic therapy being resumed.


Follow exam, weights, labs and titrate diuretic therapy.





JUNCTIONAL BRADYCARDIA


Initial EKG showed junctional rhythm in the 40s associated with borderline 

hyperkalemia; subsequently in sinus rhythm.


Prior history of junctional rhythm while taking beta-blocker.


Patient is currently in sinus rhythm, but at risk for recurrent bradycardia 

arrhythmias.


Cardiology consulted for their input.


No need for pacemaker at this time.


Outpatient cardiac monitoring recommended.





POSSIBLE SLEEP APNEA


Patient has advised to have outpatient polysomnography performed in the past, 

but has declined because she does not want to wear CPAP or BiPAP.


Importance of diagnosing and treating sleep apnea and/or obesity 

hypoventilation syndrome discussed.





?  HEPATOBILIARY DISEASE


Patient experiencing intermittent epigastric discomfort and abdominal bloating.


LFTs and lipase were normal at the time of admission.


General Surgery and Gastroenterology consulted.


CT of abdomen and pelvis showed mild pericholecystic/duodenal edema, no 

apparent cholelithiasis or choledocholithiasis, no bowel obstruction or free 

air.


Ultrasound of abdomen demonstrated thickening of gallbladder wall, no 

cholelithiasis or choledocholithiasis, trace pericholecystic fluid, no ductal 

dilatation, mild fullness of left renal pelvis.


HIDA scan did not show any evidence of cholecystitis.


No need for surgical intervention at this time.





DM TYPE 2


History of diabetes mellitus type 2 on insulin therapy.


Random glucose of the time of admission was 113.


Continue Lantus / NovoLog per protocol.





MORBID OBESITY


Weight 117 kg, BMI 47.


AHA, diabetic diet.





VTE PROPHYLAXIS


SCD's ordered.


Enoxaparin added.


Ambulate.





DISPOSITION


Expected discharge to home.


Family Medicine follow-up with Dr. Marcos.


Cardiology follow-up with Mark Lombardo, PA-C.


Nephrology follow-up with Dr. Moreno.


.


Current Inpatient Medications:





Current Inpatient Medications








 Medications


  (Trade)  Dose


 Ordered  Sig/Will


 Route  Start Time


 Stop Time Status Last Admin


Dose Admin


 


 Aspirin


  (Ecotrin Tab)  81 mg  QAM


 PO  4/9/18 09:00


 5/9/18 08:59  4/11/18 08:31


81 MG


 


 Lorazepam


  (Ativan Tab)  2 mg  HS  PRN


 PO  4/8/18 16:45


 5/8/18 16:44  4/10/18 22:31


2 MG


 


 Glucose


  (Glucose 40% Gel)  15-30


 GRAMS 15


 GRAMS...  UD  PRN


 PO  4/8/18 17:00


 5/8/18 16:59   


 


 


 Glucose


  (Glucose Chew


 Tab)  4-8


 Tablets 4


 Tabl...  UD  PRN


 PO  4/8/18 17:00


 5/8/18 16:59   


 


 


 Dextrose


  (Dextrose 50%


 50ML Syringe)  25-50ML OF


 50% DW IV


 FOR...  UD  PRN


 IV  4/8/18 17:00


 5/8/18 16:59   


 


 


 Glucagon


  (Glucagon Inj)  1 mg  UD  PRN


 SQ  4/8/18 17:00


 5/8/18 16:59   


 


 


 Insulin Aspart


  (novoLOG ASPART)  SLIDING


 SCALE  ACHS


 SC  4/9/18 11:00


 5/9/18 10:59  4/11/18 17:39


10 UNITS


 


 Acetaminophen


  (Tylenol Tab)  1,000 mg  Q8H  PRN


 PO  4/9/18 22:00


 5/9/18 21:59  4/9/18 23:07


1,000 MG


 


 Ondansetron HCl


  (Zofran Inj)  4 mg  Q6H  PRN


 IV  4/10/18 04:45


 5/10/18 04:44  4/10/18 13:27


4 MG


 


 Enoxaparin Sodium


  (Lovenox Inj)  40 mg  HS


 SQ  4/10/18 21:00


 5/10/18 20:59  4/11/18 20:01


40 MG


 


 Insulin Glargine


  (Lantus Solostar


 Pen)  BSG


 LANTUS


 UNITS S...  BID


 SC  4/10/18 21:00


 5/10/18 20:59  4/11/18 20:38


30 UNITS


 


 Pantoprazole


 Sodium


  (Protonix Tab)  40 mg  QAM


 PO  4/11/18 09:00


 4/15/18 08:59  4/11/18 08:31


40 MG


 


 Al Hydrox/Mg


 Hydrox/Simethicone


  (Maalox Max Susp)  15 ml  Q6H  PRN


 PO  4/10/18 15:45


 5/10/18 15:44  4/11/18 08:33


15 ML


 


 Senna/Docusate


 Sodium


  (Senokot S Tab)  2 tab  BID


 PO  4/10/18 21:00


 5/10/18 20:59  4/11/18 20:00


2 TAB


 


 Polyethylene


  (Miralax Powder


 Packet)  17 gm  Q6H  PRN


 PO  4/10/18 17:00


 5/10/18 16:59  4/11/18 13:10


17 GM


 


 Bumetanide


  (Bumex Tab)  1 mg  BID17


 PO  4/11/18 17:00


 5/11/18 16:59  4/11/18 16:20


1 MG


 


 Gabapentin


  (Neurontin Cap)  600 mg  QAM


 PO  4/12/18 09:00


 5/12/18 08:59   


 


 


 Gabapentin


  (Neurontin Cap)  900 mg  HS


 PO  4/11/18 21:00


 5/11/18 20:59  4/11/18 20:00


900 MG

## 2018-04-11 NOTE — NEPHROLOGY PROGRESS NOTE
Nephrology Progress Note


Date of Service


Apr 11, 2018.





Chief Complaint


Acute on chronic kidney disease





Subjective


Ms. Mantilla was seen & examined in the PCU this morning.  Her daughter was 

present at bedside.  Ms. Mantilla reports that her LE swelling has quickly 

worsened while off diuretic therapy.





Review of Systems


Constitutional:  No fever


Cardiovascular:  No chest pain


Respiratory:  No dyspnea at rest


Abdomen:  No pain, No nausea, No vomiting


Extremities:  + leg edema


A complete review of systems was performed.  Pertinent positives are noted 

above. All other systems are negative.





Vital Signs





Last 8 Hrs








  Date Time  Temp Pulse Resp B/P (MAP) Pulse Ox O2 Delivery O2 Flow Rate FiO2


 


4/11/18 08:09 36.6 71 18 128/84 (99) 96 Room Air  


 


4/11/18 04:00      Room Air  


 


4/11/18 03:30 36.9 69 22 122/65 (84) 95 Room Air  











Last Recorded Weight


Weight (Kilograms):  115.400





Physical Exam


General Appearance:  no apparent distress


Head:  normocephalic, atraumatic


Eyes:  PERRL, EOMI


Neck:  no adenopathy


Respiratory/Chest:  lungs clear, no respiratory distress


Cardiovascular:  regular rate, rhythm


Abdomen/GI:  normal bowel sounds, non tender, soft


Extremities/Musculoskelatal:  + swelling (2+ pretibial pitting edema)


Neurologic/Psych:  alert, oriented x 3





Family History





Diabetes mellitus


FH: CAD (coronary artery disease)


Hypertension


Stroke


Negative for CKD/ESRD





Social History


Smoking Status:  Never smoker


Smokeless Tobacco Use:  No


Alcohol Use:  none


Drug Use:  none


Marital Status:  


Housing Status:  lives alone


Occupation:  retired


.  Retired.  Never a smoker





Laboratory Results


Past 24 Hours


4/11/18 07:55

















Test


  4/10/18


11:26 4/10/18


14:24 4/10/18


16:17 4/10/18


20:42


 


Bedside Glucose


  247 mg/dl


(70-90) 


  124 mg/dl


(70-90) 112 mg/dl


(70-90)


 


Prothrombin Time


  


  10.7 SECONDS


(9.0-12.0) 


  


 


 


Prothromb Time International


Ratio 


  1.0 (0.9-1.1) 


  


  


 


 


Activated Partial


Thromboplast Time 


  25.2 SECONDS


(21.0-31.0) 


  


 


 


Partial Thromboplastin Ratio  1.0   


 


Test


  4/11/18


07:11 4/11/18


07:55 


  


 


 


Bedside Glucose


  113 mg/dl


(70-90) 


  


  


 


 


Anion Gap


  


  6.0 mmol/L


(3-11) 


  


 


 


Est Creatinine Clear Calc


Drug Dose 


  36.9 ml/min 


  


  


 


 


Estimated GFR (


American) 


  37.0 


  


  


 


 


Estimated GFR (Non-


American 


  31.9 


  


  


 


 


BUN/Creatinine Ratio  35.7 (10-20)   


 


Estimated Average Glucose  148 mg/dl   


 


Hemoglobin A1c


  


  6.8 %


(4.5-5.6) 


  


 


 


Calcium Level


  


  9.5 mg/dl


(8.5-10.1) 


  


 


 


Triglycerides Level


  


  119 mg/dl


(0-150) 


  


 


 


Cholesterol Level


  


  107 mg/dl


(0-200) 


  


 


 


HDL Cholesterol  40 mg/dl   


 


LDL Cholesterol, Calculated  43 mg/dl   


 


VLDL Cholesterol, Calculated  24 mg/dl   


 


Cholesterol/HDL Ratio  2.7   











Allergies


Coded Allergies:  


     Sulfamethoxazole w/Trimethoprim (Verified  Allergy, Unknown, RASH, 4/8/18)


     Codeine (Verified  Adverse Reaction, Mild, nausea, 4/8/18)





Medications





Current Inpatient Medications








 Medications


  (Trade)  Dose


 Ordered  Sig/Will


 Route  Start Time


 Stop Time Status Last Admin


Dose Admin


 


 Aspirin


  (Ecotrin Tab)  81 mg  QAM


 PO  4/9/18 09:00


 5/9/18 08:59  4/11/18 08:31


81 MG


 


 Lorazepam


  (Ativan Tab)  2 mg  HS  PRN


 PO  4/8/18 16:45


 5/8/18 16:44  4/10/18 22:31


2 MG


 


 Glucose


  (Glucose 40% Gel)  15-30


 GRAMS 15


 GRAMS...  UD  PRN


 PO  4/8/18 17:00


 5/8/18 16:59   


 


 


 Glucose


  (Glucose Chew


 Tab)  4-8


 Tablets 4


 Tabl...  UD  PRN


 PO  4/8/18 17:00


 5/8/18 16:59   


 


 


 Dextrose


  (Dextrose 50%


 50ML Syringe)  25-50ML OF


 50% DW IV


 FOR...  UD  PRN


 IV  4/8/18 17:00


 5/8/18 16:59   


 


 


 Glucagon


  (Glucagon Inj)  1 mg  UD  PRN


 SQ  4/8/18 17:00


 5/8/18 16:59   


 


 


 Insulin Aspart


  (novoLOG ASPART)  SLIDING


 SCALE  ACHS


 SC  4/9/18 11:00


 5/9/18 10:59  4/11/18 08:30


10 UNITS


 


 Acetaminophen


  (Tylenol Tab)  1,000 mg  Q8H  PRN


 PO  4/9/18 22:00


 5/9/18 21:59  4/9/18 23:07


1,000 MG


 


 Ondansetron HCl


  (Zofran Inj)  4 mg  Q6H  PRN


 IV  4/10/18 04:45


 5/10/18 04:44  4/10/18 13:27


4 MG


 


 Enoxaparin Sodium


  (Lovenox Inj)  40 mg  HS


 SQ  4/10/18 21:00


 5/10/18 20:59  4/10/18 20:41


40 MG


 


 Insulin Glargine


  (Lantus Solostar


 Pen)  BSG


 LANTUS


 UNITS S...  BID


 SC  4/10/18 21:00


 5/10/18 20:59  4/11/18 08:30


25 UNITS


 


 Pantoprazole


 Sodium


  (Protonix Tab)  40 mg  QAM


 PO  4/11/18 09:00


 4/15/18 08:59  4/11/18 08:31


40 MG


 


 Al Hydrox/Mg


 Hydrox/Simethicone


  (Maalox Max Susp)  15 ml  Q6H  PRN


 PO  4/10/18 15:45


 5/10/18 15:44  4/11/18 08:33


15 ML


 


 Senna/Docusate


 Sodium


  (Senokot S Tab)  2 tab  BID


 PO  4/10/18 21:00


 5/10/18 20:59  4/11/18 08:31


2 TAB


 


 Polyethylene


  (Miralax Powder


 Packet)  17 gm  Q6H  PRN


 PO  4/10/18 17:00


 5/10/18 16:59   


 











Impression





(1) Acute tubular necrosis


(2) Chronic kidney disease, stage IV (severe)


(3) Morbid obesity


(4) Diabetes mellitus type 2


(5) Benign hypertension


(6) Coronary artery disease


(7) Lymphedema





ATN due to dehydration in the setting of ACE inhibitor therapy


HIDA scan was negative for obstruction of GB





Recommendations


ACUTE KIDNEY INJURY:


-- Resolved.





CHRONIC KIDNEY DISEASE:


-- Baseline creatinine has been 2.0





HYPERTENSION:


-- Blood pressure is relatively low.  Continue to hold Amlodipine


-- Avoid Lisinopril due to recent DAVEY





EDEMA:


-- Will resume Bumex 1 mg po BID


-- Monitor I&O's, weight and blood pressure





ANEMIA:


-- Mild asymptomatic anemia.  Hgb remains stable at 10.3.  Will monitor





GI:


-- HIDA scan 04/10:  No GB obstruction





CV:


-- Cardiology note 04/10 reviewed:  admission ECG w/ junctional bradycardia.  

Patient will be scheduled for 2 week Zio monitor

## 2018-04-11 NOTE — CARDIOLOGY FOLLOW-UP
Subjective


General


Date of Service:


Apr 11, 2018.


Chief Complaint:  junctional bradycardia





History of Present Illness


Patient feeling relatively well today. Epigastric pain improved. Notes 

worsening LE edema since holding diuretics. Per neprhology, Bumex resumed this 

AM. Denies associated chest pain or SOB. No dizziness. No palpitations or 

tachypalpitations. 





Telemetry reviewed - NSR, no significant bradyarrhythmias since day of 

admission.





Allergies


Coded Allergies:  


     Sulfamethoxazole w/Trimethoprim (Verified  Allergy, Unknown, RASH, 4/8/18)


     Codeine (Verified  Adverse Reaction, Mild, nausea, 4/8/18)





Social History


Smoking Status:  Never Smoker


Hx Tobacco Use In Past Year?:  No


Hx Alcohol Use - Type And Amou:  No


Hx Substance Use - Type And Am:  No





Problem List


Medical Problems:


(1) Acute renal failure


Status: Acute  





(2) Acute renal failure


Status: Acute  





(3) Bradycardia


Status: Acute  





(4) Epigastric abdominal pain


Status: Acute  





(5) Hyperkalemia


Status: Acute  





(6) Precordial chest pain


Status: Acute  





(7) Weakness


Status: Acute  











Review of Systems


Respiratory:  No cough, No wheezing, No shortness of breath, No dyspnea at rest

, No hemoptysis


Cardiac:  + edema, No chest pain, No orthopnea, No PND, No palpitations





Physical Exam


Vital Signs





Last Vital Signs Documentation








  Date Time  Temp Pulse Resp B/P (MAP) Pulse Ox O2 Delivery O2 Flow Rate FiO2


 


4/11/18 11:37 36.7 73 19 140/48 (78) 97 Room Air  











Physical Exam


Constitutional:  


   General Apperance:  obese


   Level of Distress:  NAD


Psychiatric:  


   Mental Status:  active & alert


   Orientation:  to time, to place, to person


Head:  normocephalic


Eyes:  


   Pupils:  PERRLA


Neck:  supple


Lungs:  


   Auscultation:  no wheezing, no rales/crackles, deminished air movement


Cardiovascular:  


   Heart Auscultation:  RRR, normal S1, normal S2, no murmurs


Abdomen:  


   Bowel Sounds:  normal


   Inspection & Palpation:  soft, non-distended


Extremities:  edema (2+ LE edema to knees)





Assessment and Plan


Assessment and Plan


ASSESSMENT:  76 year old female


1. Junctional rhythm, in setting of DAVEY with borderline hyperkalemia.  She had 

no symptoms at the time of arrhythmia. Since renal function/electrolytes have 

improved, there has been no recurrence of bradyarrhythmias on telemetry. 


2. chronic diastolic HF - LE edema noted since holding diuretics. No other CHF 

symptoms. 


3. DAVEY with underlying CKD followed by Dr. Moreno. Resume diuretics today


4. History fo hyperkalemia - monitor closely. 


4. History of CAD - no anginal symptoms. 








RECOMMENDATIONS/PLAN: 


Bumex resumed today. Monitor edema and electrolytes.


Cardiology office will contact patient to arrange 1 week Zio monitor to 

evaluate for further arrhythmias. 


She has a history of junctional bradycardia during times of DAVEY and 

hyperkalemia. 


She is asymptomatic.


Continue all other cardiac medications. 


Patient has known appt on 5/9/18 at  with M. Lombardo, PA-C. She wishes to 

keep this appt. 





Case discussed with Dr. Moore. Will sign off. Please notify if there are 

additional questions or concerns. 





CARDIOLOGY ATTENDING ADDENDUM: 


The patient was seen and personally examined.


Agree with Arely Geiger PA-C's findings and plans as documented above.


I agree with discharge planning, outpatient follow-up and a monitor as an 

outpatient.














Laboratory Results





Last 24 Hours








Test


  4/10/18


14:24 4/10/18


16:17 4/10/18


20:42 4/11/18


07:11


 


Prothrombin Time 10.7 SECONDS    


 


Prothromb Time International


Ratio 1.0 


  


  


  


 


 


Activated Partial


Thromboplast Time 25.2 SECONDS 


  


  


  


 


 


Partial Thromboplastin Ratio 1.0    


 


Bedside Glucose  124 mg/dl  112 mg/dl  113 mg/dl 


 


Test


  4/11/18


07:55 


  


  


 


 


Sodium Level 143 mmol/L    


 


Potassium Level 4.2 mmol/L    


 


Chloride Level 109 mmol/L    


 


Carbon Dioxide Level 28 mmol/L    


 


Anion Gap 6.0 mmol/L    


 


Blood Urea Nitrogen 56 mg/dl    


 


Creatinine 1.56 mg/dl    


 


Est Creatinine Clear Calc


Drug Dose 36.9 ml/min 


  


  


  


 


 


Estimated GFR (


American) 37.0 


  


  


  


 


 


Estimated GFR (Non-


American 31.9 


  


  


  


 


 


BUN/Creatinine Ratio 35.7    


 


Random Glucose 126 mg/dl    


 


Estimated Average Glucose 148 mg/dl    


 


Hemoglobin A1c 6.8 %    


 


Calcium Level 9.5 mg/dl    


 


Triglycerides Level 119 mg/dl    


 


Cholesterol Level 107 mg/dl    


 


HDL Cholesterol 40 mg/dl    


 


LDL Cholesterol, Calculated 43 mg/dl    


 


VLDL Cholesterol, Calculated 24 mg/dl    


 


Cholesterol/HDL Ratio 2.7

## 2018-04-12 NOTE — PROGRESS NOTE
Medicine Progress Note


Date & Time of Visit:


Apr 12, 2018


.


Subjective





Doing well.


Anxious to go home.


No chest pain, cough, shortness of breath.


Ambulating without difficulty.


.





Objective





Last 8 Hrs








  Date Time  Temp Pulse Resp B/P (MAP) Pulse Ox O2 Delivery O2 Flow Rate FiO2


 


4/12/18 15:10 36.7 61 16  98 Room Air  








Physical Exam:





General- sitting in chair at bedside; no distress


Lungs- clear to auscultation; no respiratory distress


Cardiovascular- RRR; II/VI systolic murmur LSB; no gallop appreciated; + JVD; 3

+ pretibial edema


Abdomen- + bowel sounds, soft, nontender 


Extremities- no cyanosis; no calf tenderness


Neuro- alert, oriented


Skin- warm & dry


.


Laboratory Results:





Last 24 Hours








Test


  4/12/18


07:14 4/12/18


07:23 4/12/18


11:18


 


Bedside Glucose 78 mg/dl   164 mg/dl 


 


Sodium Level  143 mmol/L  


 


Potassium Level  3.9 mmol/L  


 


Chloride Level  107 mmol/L  


 


Carbon Dioxide Level  32 mmol/L  


 


Anion Gap  4.0 mmol/L  


 


Blood Urea Nitrogen  47 mg/dl  


 


Creatinine  1.52 mg/dl  


 


Est Creatinine Clear Calc


Drug Dose 


  38.0 ml/min 


  


 


 


Estimated GFR (


American) 


  38.2 


  


 


 


Estimated GFR (Non-


American 


  33.0 


  


 


 


BUN/Creatinine Ratio  30.6  


 


Random Glucose  54 mg/dl  


 


Calcium Level  9.6 mg/dl  











Assessment & Plan





ACUTE KIDNEY INJURY / CHRONIC KIDNEY DISEASE STAGE IV


CKD IV with baseline creatinine of 2, followed by Dr. Moreno.


Serum creatinine on admission was 4.15.


Nephrology consulted.


Diuretics held.


Serum creatinine today = 1.52.


Bumetanide resumed at previous dose of 1.5 mg each morning and 1 mg in the 

evening.


Lisinopril discontinued.


Avoid NSAID's.


Follow.





CORONARY ARTERY DISEASE


History of coronary artery disease, status post PCI of right coronary artery in 

2002.


Was experiencing epigastric discomfort and dyspnea which may or may not 

represent myocardial ischemia.


Initial EKG showed junctional rhythm in the 40s associated with borderline 

hyperkalemia; subsequently in sinus rhythm.


Troponin at time of admission was 0.02.


Continue aspirin.


Not on beta-blocker secondary to junctional rhythms.


Continue statin.





PROBABLE CHF


Chest x-ray showed cardiomegaly, mild interstitial prominence (?CHF).


Echocardiogram performed on 1/26/18 demonstrated mild concentric LVH, normal 

left ventricular wall motion and function with LVEF of 60-65%, grade 1 

diastolic dysfunction, mild mitral regurgitation, normal right ventricular 

dimensions and systolic function.


May have underlying sleep apnea and/or obesity hypoventilation syndrome.


Probable chronic left ventricular diastolic heart failure, perhaps with a 

component of right-sided failure as well.


Diuretics held due to acute kidney injury.


Renal function improved and diuretic therapy being resumed.


Follow exam, weights, labs and titrate diuretic therapy.





JUNCTIONAL BRADYCARDIA


Initial EKG showed junctional rhythm in the 40s associated with borderline 

hyperkalemia; subsequently in sinus rhythm.


Prior history of junctional rhythm while taking beta-blocker.


Cardiology consulted for their input regarding possible need for pacemaker.


No need for pacemaker at this time.


Outpatient cardiac monitoring recommended and will be arranged.





HYPERTENSION


Lisinopril discontinued because of acute kidney injury.


No beta blocker because of junctional rhythm.


Discharged on amlodipine 5 mg daily.





POSSIBLE SLEEP APNEA


Patient has advised to have outpatient polysomnography performed in the past, 

but has declined because she does not want to wear CPAP or BiPAP.


Importance of diagnosing and treating sleep apnea and/or obesity 

hypoventilation syndrome discussed.





?  HEPATOBILIARY DISEASE


Patient experiencing intermittent epigastric discomfort and abdominal bloating.


LFTs and lipase were normal at the time of admission.


General Surgery and Gastroenterology consulted.


CT of abdomen and pelvis showed mild pericholecystic/duodenal edema, no 

apparent cholelithiasis or choledocholithiasis, no bowel obstruction or free 

air.


Ultrasound of abdomen demonstrated thickening of gallbladder wall, no 

cholelithiasis or choledocholithiasis, trace pericholecystic fluid, no ductal 

dilatation, mild fullness of left renal pelvis.


HIDA scan did not show any evidence of cholecystitis.


No need for surgical intervention at this time.





EPIGASTRIC PAIN


GI consulted.


Hepatobiliary evaluation as noted above.


No need for endoscopy at this time.


Symptoms improved with pantoprazole.


Discharged on omeprazole 40 mg daily.


Advised to avoid NSAIDs due to renal and GI side effects.


Reconsider upper GI evaluation if symptoms recur.





DM TYPE 2


History of diabetes mellitus type 2 on insulin therapy.


Lantus / NovoLog per protocol.


Fasting blood sugar today = 78.


Discharged on usual regimen.





MORBID OBESITY


Weight 117 kg, BMI 47.


AHA, diabetic diet.





VTE PROPHYLAXIS


SCD's ordered.


Enoxaparin added.


Ambulate.





DISPOSITION


Discharge to home.


Family Medicine follow-up with Dr. Marcos.


Cardiology follow-up with Mark Lombardo, PA-C.


Nephrology follow-up with Dr. Moreno.


.


Consultants:


General Surgery


GI


Nephrology with Dr. Moreno


Cardiology


.


Procedures:


Cardiac monitoring


Intravenous medications


CT of abdomen and pelvis


Ultrasound abdomen


HIDA scan


.

## 2018-04-12 NOTE — NEPHROLOGY PROGRESS NOTE
Nephrology Progress Note


Date of Service


Apr 12, 2018.





Chief Complaint


Acute on chronic kidney disease





Subjective


Ms. Mantilla was seen & examined in her hospital room this morning.  She denies 

angina or palpitations.  She reports brisk urine output in response to oral 

Bumex therapy.  She notes that her leg edema is mildly improved.





Review of Systems


Constitutional:  No fever


Cardiovascular:  No chest pain


Respiratory:  No dyspnea at rest


Abdomen:  No pain, No nausea, No vomiting


Extremities:  + leg edema


A complete review of systems was performed.  Pertinent positives are noted 

above. All other systems are negative.





Vital Signs





Last 8 Hrs








  Date Time  Temp Pulse Resp B/P (MAP) Pulse Ox O2 Delivery O2 Flow Rate FiO2


 


4/12/18 07:26 36.9 64 20 144/87 (106) 94 Room Air  


 


4/12/18 04:21 36.8 63 22 149/63 (91) 92 Room Air  


 


4/12/18 04:00     92 Room Air  











Last Recorded Weight


Weight (Kilograms):  116.000





Physical Exam


General Appearance:  no apparent distress


Head:  normocephalic, atraumatic


Eyes:  PERRL, EOMI


Neck:  no adenopathy


Respiratory/Chest:  lungs clear


Cardiovascular:  regular rate, rhythm


Abdomen/GI:  normal bowel sounds, non tender, soft


Extremities/Musculoskelatal:  + swelling (2+ pretibial pitting edema)


Neurologic/Psych:  alert, oriented x 3





Family History





Diabetes mellitus


FH: CAD (coronary artery disease)


Hypertension


Stroke


Negative for CKD/ESRD





Social History


Smoking Status:  Never smoker


Smokeless Tobacco Use:  No


Alcohol Use:  none


Drug Use:  none


Marital Status:  


Housing Status:  lives alone


Occupation:  retired


.  Retired.  Never a smoker





Laboratory Results


Past 24 Hours











Test


  4/11/18


11:42 4/11/18


16:33 4/11/18


20:14 4/12/18


07:14


 


Bedside Glucose


  106 mg/dl


(70-90) 104 mg/dl


(70-90) 135 mg/dl


(70-90) 78 mg/dl


(70-90)


 


Test


  4/12/18


07:23 


  


  


 











Allergies


Coded Allergies:  


     Sulfamethoxazole w/Trimethoprim (Verified  Allergy, Unknown, RASH, 4/8/18)


     Codeine (Verified  Adverse Reaction, Mild, nausea, 4/8/18)





Medications





Current Inpatient Medications








 Medications


  (Trade)  Dose


 Ordered  Sig/Will


 Route  Start Time


 Stop Time Status Last Admin


Dose Admin


 


 Aspirin


  (Ecotrin Tab)  81 mg  QAM


 PO  4/9/18 09:00


 5/9/18 08:59  4/12/18 08:34


81 MG


 


 Lorazepam


  (Ativan Tab)  2 mg  HS  PRN


 PO  4/8/18 16:45


 5/8/18 16:44  4/11/18 22:19


2 MG


 


 Glucose


  (Glucose 40% Gel)  15-30


 GRAMS 15


 GRAMS...  UD  PRN


 PO  4/8/18 17:00


 5/8/18 16:59   


 


 


 Glucose


  (Glucose Chew


 Tab)  4-8


 Tablets 4


 Tabl...  UD  PRN


 PO  4/8/18 17:00


 5/8/18 16:59   


 


 


 Dextrose


  (Dextrose 50%


 50ML Syringe)  25-50ML OF


 50% DW IV


 FOR...  UD  PRN


 IV  4/8/18 17:00


 5/8/18 16:59   


 


 


 Glucagon


  (Glucagon Inj)  1 mg  UD  PRN


 SQ  4/8/18 17:00


 5/8/18 16:59   


 


 


 Insulin Aspart


  (novoLOG ASPART)  SLIDING


 SCALE  ACHS


 SC  4/9/18 11:00


 5/9/18 10:59  4/12/18 08:35


4 UNITS


 


 Acetaminophen


  (Tylenol Tab)  1,000 mg  Q8H  PRN


 PO  4/9/18 22:00


 5/9/18 21:59  4/9/18 23:07


1,000 MG


 


 Ondansetron HCl


  (Zofran Inj)  4 mg  Q6H  PRN


 IV  4/10/18 04:45


 5/10/18 04:44  4/10/18 13:27


4 MG


 


 Enoxaparin Sodium


  (Lovenox Inj)  40 mg  HS


 SQ  4/10/18 21:00


 5/10/18 20:59  4/11/18 20:01


40 MG


 


 Insulin Glargine


  (Lantus Solostar


 Pen)  BSG


 LANTUS


 UNITS S...  BID


 SC  4/10/18 21:00


 5/10/18 20:59  4/11/18 20:38


30 UNITS


 


 Pantoprazole


 Sodium


  (Protonix Tab)  40 mg  QAM


 PO  4/11/18 09:00


 4/15/18 08:59  4/12/18 08:34


40 MG


 


 Al Hydrox/Mg


 Hydrox/Simethicone


  (Maalox Max Susp)  15 ml  Q6H  PRN


 PO  4/10/18 15:45


 5/10/18 15:44  4/11/18 08:33


15 ML


 


 Senna/Docusate


 Sodium


  (Senokot S Tab)  2 tab  BID


 PO  4/10/18 21:00


 5/10/18 20:59  4/12/18 08:34


2 TAB


 


 Polyethylene


  (Miralax Powder


 Packet)  17 gm  Q6H  PRN


 PO  4/10/18 17:00


 5/10/18 16:59  4/11/18 13:10


17 GM


 


 Bumetanide


  (Bumex Tab)  1 mg  BID17


 PO  4/11/18 17:00


 5/11/18 16:59  4/12/18 08:34


1 MG


 


 Gabapentin


  (Neurontin Cap)  600 mg  QAM


 PO  4/12/18 09:00


 5/12/18 08:59  4/12/18 08:34


600 MG


 


 Gabapentin


  (Neurontin Cap)  900 mg  HS


 PO  4/11/18 21:00


 5/11/18 20:59  4/11/18 20:00


900 MG


 


 Bumetanide


  (Bumex Tab)  0.5 mg  QAM


 PO  4/12/18 09:00


 5/12/18 08:59 UNV  


 


 


 Bumetanide


  (Bumex Tab)  0.5 mg  0849  ONCE


 PO  4/12/18 08:49


 4/12/18 08:50 UNV  


 











Impression





(1) Acute tubular necrosis


(2) Chronic kidney disease, stage IV (severe)


(3) Morbid obesity


(4) Diabetes mellitus type 2


(5) Benign hypertension


(6) Coronary artery disease


(7) Lymphedema





ATN due to dehydration in the setting of ACE inhibitor therapy


HIDA scan was negative for obstruction of GB





Recommendations


ACUTE KIDNEY INJURY:


-- Resolved.





CHRONIC KIDNEY DISEASE:


-- Baseline creatinine has been 2.0





HYPERTENSION:


-- Blood pressure is slowly trending up.  Resume Amlodipine 5 mg each morning


-- Avoid Lisinopril due to recent DAVEY





EDEMA:


-- Will resume home diuretic regimen of Bumex 1.5 mg each morning and 1 mg each 

evening


-- Resume KCl 20 mEQ each morning


-- Monitor I&O's, weight and blood pressure





ANEMIA:


-- Mild asymptomatic anemia.  Hgb remains stable at 10.3.  Will monitor





GI:


-- HIDA scan 04/10:  No GB obstruction





CV:


-- Cardiology note 04/10 reviewed:  admission ECG w/ junctional bradycardia.  

Patient will be scheduled for 2 week Zio monitor





OTHER:


-- Will ask office RN staff to contact patient and schedule Nephrology follow 

up office visit in 2 weeks.  Orders placed in office EMR for labs to be 

completed 24 hours prior to visit

## 2018-04-12 NOTE — DISCHARGE INSTRUCTIONS
Discharge Instructions


Date of Service


Apr 12, 2018.





Admission


Reason for Admission:  worsening kidney function + slow heart rate


.





Discharge


Discharge Diagnosis / Problem:  worsening kidney function + slow heart rate





Discharge Goals


Goal(s):  Decrease discomfort, Improve function, Improve disease control





Activity Recommendations


Activity Limitations:  resume your previous activity





.





Instructions / Follow-Up


Instructions / Follow-Up





APPOINTMENTS:





FAMILY MEDICINE


4/13/2018 1:00 PM 


Dedra Marcos MD





CARDIOLOGY


5/9/2018 10:00 AM 


Mark S Lombardo, PA-C





NEPHROLOGY


Dr. Moreno








OTHER INSTRUCTIONS:





Only new medication is omeprazole (Prilosec) to help control stomach acid.


Take 1 pill daily.





Cover leg ulcer with Optifoam every other day.





Weigh yourself daily.





Don't take ibuprofen (Advil or Motrin) unless absolutely necessary and only for 

1 day at a time.


It can make your kidney function worse and upset your stomach.





Outpatient heart monitor to be arranged.





Seek medical attention if you have:


*  temperature above 101


*  chest pain or trouble breathing


*  worsening swelling of legs or unexplained weight gain


*  abdominal pain, nausea, vomiting


*  diarrhea, dark stools or bloody stools


*  any unanswered questions or concerns





Call 911 if symptoms are severe.





Call if you have any questions or problems.


My cell # is [phone].





You can also reach a [facility] hospitalist on duty at [facility] 24 hours a day by calling [phone].





Please take good care of yourself.





Darwin Lugo


.





Current Hospital Diet


Patient's current hospital diet: Diabetes Type 2 Diet





Discharge Diet


Recommended Diet:  AHA Diet (Heart Healthy), Diabetes Type 2 Diet





Pending Studies


Studies pending at discharge:  no





Laboratory Results





Hemoglobin A1c








Test


  4/11/18


07:55 Range/Units


 


 


Estimated Average Glucose 148   mg/dl


 


Hemoglobin A1c 6.8 H 4.5-5.6  %








Lipid Panel








Test


  4/11/18


07:55 Range/Units


 


 


Triglycerides Level 119  0-150  mg/dl


 


Cholesterol Level 107  0-200  mg/dl


 


HDL Cholesterol 40   mg/dl


 


Cholesterol/HDL Ratio 2.7   


 


LDL Cholesterol, Calculated 43   mg/dl











Medical Emergencies








.


Who to Call and When:





Medical Emergencies:  If at any time you feel your situation is an emergency, 

please call 911 immediately.





.





Non-Emergent Contact


Non-Emergency issues call your:  Primary Care Provider, Cardiologist, Hospital 

Doctor





.


.








"Provider Documentation" section prepared by Darwin Lugo.








.

## 2018-04-13 NOTE — DISCHARGE SUMMARY
Discharge Summary


Date of Service


Apr 13, 2018.





Discharge Summary


Admission Date:


Apr 8, 2018 at 16:34


Discharge Date:  Apr 12, 2018


Discharge Disposition:  Home


Principal Diagnosis:


acute kidney injury





OTHER ACUTE / SECONDARY DIAGNOSES:


junctional bradycardia


borderline hyperkalemia


epigastric pain


.


Secondary Diagnoses/Problems:





Chronic and Resolved Medical Problems:





(1) Benign hypertension


Status: Chronic  





(2) Chronic kidney disease, stage IV (severe)


Status: Chronic  





(3) Coronary artery disease


Status: Chronic  





(4) Diabetes mellitus type 2


Status: Chronic  





(5) Diastolic CHF, chronic


Status: Chronic  





(6) Dyslipidemia


Status: Chronic  





(7) Glaucoma


Status: Chronic  





(8) Gout


Status: Chronic  





(9) Junctional bradycardia


Status: Resolved  





(10) Lymphedema


Status: Chronic  





(11) Morbid obesity


Status: Chronic  





(12) Neuropathy


Status: Chronic  








Surgical Problems:





(1) History of knee replacement procedure of right knee


Status: Resolved  





(2) Hx of heart artery stent


Permanent Comment: 2002 - RCA metal stent


Status: Resolved  


.





Procedures:


Cardiac monitoring


Intravenous medications


CT of abdomen and pelvis


Ultrasound abdomen


HIDA scan


.


Consultations:


General Surgery


GI


Nephrology with Dr. Moreno


Cardiology


.


Pending Studies/Follow-Up:


outpatient cardiac monitor to be arranged


please check basic metabolic profile in 1 week with copy of report to Dr. Moreno


.





Medication Reconciliation


New Medications:  


Omeprazole (Prilosec) 40 Mg Cap


40 MG PO DAILY, #30 CAP 1 Refill





Potassium Chloride (Klor-Con M20) 20 Meq Tabcr


20 MEQ PO DAILY, #30 TAB 5 Refills





 


Continued Medications:  


Allopurinol (Zyloprim) 300 Mg Tab


300 MG PO QAM, TAB





Amlodipine Besylate (Norvasc) 5 Mg Tab


5 MG PO DAILY, TAB





Aspirin Enteric Coated (Ecotrin Or Generic) 81 Mg Tab


81 MG PO QAM, TAB





Atorvastatin (Lipitor) 80 Mg Tab


80 MG PO QAM, TAB





Bumetanide (Bumex) 1 Mg Tab


1.5 TAB PO QAM for 90 Days, #135 TAB 1 Refill





Bumetanide (Bumex) 1 Mg Tab


1 TAB PO HS for 90 Days, TAB 1 Refill





Gabapentin (Neurontin) 300 Mg Cap


600 MG PO QAM, CAP





Gabapentin (Neurontin) 300 Mg Cap


900 MG PO HS





Insulin Aspart (Novolog Penfill) 100 Unit/Ml Inj


14 UNITS SQ QA





Insulin Aspart (Novolog Penfill) 100 Unit/Ml Inj


16 UNITS SQ LUNCH





Insulin Aspart (Novolog Penfill) 100 Unit/Ml Inj


18 UNITS SQ SUPPER





Insulin Glargine (Lantus Solostar) 100 Unit/Ml Inj


50 UNITS SQ QAM, #30





Insulin Glargine (Lantus Solostar) 100 Unit/Ml Inj


30 UNITS SC QPM





Lorazepam (Lorazepam) 2 Mg Tab


2 MG PO HS PRN for Sleep





Nitroglycerin (Nitrostat) 0.4 Mg Tab


0.4 MG UT PRN, TAB





Nystatin (Nystop) 45 Appln/15 Gm Powd


1 APPLN EXT PRN PRN for EXCORIATION, #1 BTL





 


Discontinued Medications:  


Lisinopril (Lisinopril) 5 Mg Tab


5 MG PO QAM, #30 TAB





Potassium Chloride (Klor-Con M20) 20 Meq Tabcr


20 MEQ PO BID, #1 TABS











Admission Information


HPI (per Admitting provider):


This is a 76 year old F with PMH significant for CKD, coronary artery with stent

, heart failure on diuretics, history of junctional arrhythmias when on beta 

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 

bradyarrhythmia as per January 2018 cardiology inpatient evaluation, who was 

having abdominal bloating and not urinating in 2 days and was found in the 

emergency room to be in acute renal failure with uremia, high normal serum 

potassium level of 5, and bradycardia. Patient denies history of fever, dysuria

, or flank pain, changes in appetite or oral intake, or problems with bowel 

movements. 


.


Physical Exam (per Admitting):  


   General Appearance:  no apparent distress, + obese


   Head:  normocephalic, atraumatic


   Eyes:  normal inspection, EOMI, sclerae normal


   ENT:  normal ENT inspection, hearing grossly normal, pharynx normal


   Neck:  supple, no JVD, trachea midline


   Respiratory/Chest:  chest non-tender, lungs clear, normal breath sounds, no 

respiratory distress, no accessory muscle use


   Cardiovascular:  no JVD, normal peripheral pulses, + bradycardia, + 

pertinent finding (1+ EDEMA bilateral lower extremities)


   Abdomen/GI:  normal bowel sounds, non tender, soft, no organomegaly, no 

pulsatile mass


   Back:  normal inspection, no CVA tenderness, no muscle spasm, normal range 

of motion


   Extremities/Musculoskelatal:  normal inspection, no calf tenderness, normal 

range of motion, non-tender


   Neurologic/Psych:  no motor/sensory deficits, alert, normal mood/affect, 

oriented x 3


   Skin:  normal color, warm/dry, no rash





Hospital Course





ACUTE KIDNEY INJURY / CHRONIC KIDNEY DISEASE STAGE IV


CKD IV with baseline creatinine of 2, followed by Dr. Moreno.


Serum creatinine on admission was 4.15.


Nephrology consulted.


Diuretics held.


Serum day of discharge was 1.52.


Bumetanide resumed at previous dose of 1.5 mg each morning and 1 mg in the 

evening.


Lisinopril discontinued.


Avoid NSAID's.


Follow.





BORDERLINE HYPERKALEMIA


Serum potassium at time of admission was 5.0 in the setting of acute kidney 

injury.


Associated junctional bradycardia.


Lisinopril was discontinued.


Discharged on reduced dose of potassium chloride.


Follow.





CORONARY ARTERY DISEASE


History of coronary artery disease, status post PCI of right coronary artery in 

2002.


Was experiencing epigastric discomfort and dyspnea which may or may not 

represent myocardial ischemia.


Initial EKG showed junctional rhythm in the 40s associated with borderline 

hyperkalemia; subsequently in sinus rhythm.


Troponin at time of admission was 0.02.


Continue aspirin.


Not on beta-blocker secondary to junctional rhythms.


Continue statin.





PROBABLE CHF


Chest x-ray showed cardiomegaly, mild interstitial prominence (?CHF).


Echocardiogram performed on 1/26/18 demonstrated mild concentric LVH, normal 

left ventricular wall motion and function with LVEF of 60-65%, grade 1 

diastolic dysfunction, mild mitral regurgitation, normal right ventricular 

dimensions and systolic function.


May have underlying sleep apnea and/or obesity hypoventilation syndrome.


Probable chronic left ventricular diastolic heart failure, perhaps with a 

component of right-sided failure as well.


Diuretics held due to acute kidney injury.


Renal function improved and diuretic therapy being resumed.


Follow exam, weights, labs and titrate diuretic therapy.





JUNCTIONAL BRADYCARDIA


Initial EKG showed junctional rhythm in the 40s associated with borderline 

hyperkalemia; subsequently in sinus rhythm.


Prior history of junctional rhythm while taking beta-blocker.


Cardiology consulted for their input regarding possible need for pacemaker.


No need for pacemaker at this time.


Outpatient cardiac monitoring recommended and will be arranged.





HYPERTENSION


Lisinopril discontinued because of acute kidney injury.


No beta blocker because of junctional rhythm.


Discharged on amlodipine 5 mg daily.





POSSIBLE SLEEP APNEA


Patient has advised to have outpatient polysomnography performed in the past, 

but has declined because she does not want to wear CPAP or BiPAP.


Importance of diagnosing and treating sleep apnea and/or obesity 

hypoventilation syndrome discussed.





?  HEPATOBILIARY DISEASE


Patient experiencing intermittent epigastric discomfort and abdominal bloating.


LFTs and lipase were normal at the time of admission.


General Surgery and Gastroenterology consulted.


CT of abdomen and pelvis showed mild pericholecystic/duodenal edema, no 

apparent cholelithiasis or choledocholithiasis, no bowel obstruction or free 

air.


Ultrasound of abdomen demonstrated thickening of gallbladder wall, no 

cholelithiasis or choledocholithiasis, trace pericholecystic fluid, no ductal 

dilatation, mild fullness of left renal pelvis.


HIDA scan did not show any evidence of cholecystitis.


No need for surgical intervention at this time.





EPIGASTRIC PAIN


GI consulted.


Hepatobiliary evaluation as noted above.


No need for endoscopy at this time.


Symptoms improved with pantoprazole.


Discharged on omeprazole 40 mg daily.


Advised to avoid NSAIDs due to renal and GI side effects.


Reconsider upper GI evaluation if symptoms recur.





DM TYPE 2


History of diabetes mellitus type 2 on insulin therapy.


Lantus / NovoLog per protocol.


Fasting blood sugar day of discharge was 78.


Discharged on usual regimen.





MORBID OBESITY


Weight 117 kg, BMI 47.


AHA, diabetic diet.





VTE PROPHYLAXIS


SCD's ordered.


Enoxaparin added.


Ambulate.





DISPOSITION


Discharge to home.


Family Medicine follow-up with Dr. Marcos.


Cardiology follow-up with Mark Lombardo, PA-C.


Nephrology follow-up with Dr. Moreno.


.


Total time spent on discharge = 40 min.


This includes examination of the patient, discharge planning, medication 

reconciliation, and communication with other providers.


.





Discharge Instructions


 [place] [address]


 


 


 Discharge Medical


 


Patient Name: Radha Mantilla Unit Number: [id number]


YOB: 1942 Patient Status: Discharged Inpatient


Attending Doctor: Darwin Lugo M.D. Account Number: [id number]


   


 DI: Medical v5 


Discharge Instructions


Date of Service


Apr 12, 2018.





Admission


Reason for Admission:  worsening kidney function + slow heart rate


.





Discharge


Discharge Diagnosis / Problem:  worsening kidney function + slow heart rate





Discharge Goals


Goal(s):  Decrease discomfort, Improve function, Improve disease control





Activity Recommendations


Activity Limitations:  resume your previous activity





.





Instructions / Follow-Up


Instructions / Follow-Up





APPOINTMENTS:





FAMILY MEDICINE


4/13/2018 1:00 PM 


Dedra Marcos MD





CARDIOLOGY


5/9/2018 10:00 AM 


Mark S Lombardo, PA-C





NEPHROLOGY


Dr. Moreno








OTHER INSTRUCTIONS:





Only new medication is omeprazole (Prilosec) to help control stomach acid.


Take 1 pill daily.





Cover leg ulcer with Optifoam every other day.





Weigh yourself daily.





Don't take ibuprofen (Advil or Motrin) unless absolutely necessary and only for 

1 day at a time.


It can make your kidney function worse and upset your stomach.





Outpatient heart monitor to be arranged.





Seek medical attention if you have:


*  temperature above 101


*  chest pain or trouble breathing


*  worsening swelling of legs or unexplained weight gain


*  abdominal pain, nausea, vomiting


*  diarrhea, dark stools or bloody stools


*  any unanswered questions or concerns





Call 911 if symptoms are severe.





Call if you have any questions or problems.


My cell # is [phone].





You can also reach a [facility] hospitalist on duty at [facility] 24 hours a day by calling [phone].





Please take good care of yourself.





Darwin Lugo


.





Current Hospital Diet


Patient's current hospital diet: Diabetes Type 2 Diet





Discharge Diet


Recommended Diet:  AHA Diet (Heart Healthy), Diabetes Type 2 Diet





Pending Studies


Studies pending at discharge:  no





Laboratory Results





Hemoglobin A1c








Test


  4/11/18


07:55 Range/Units


 


 


Estimated Average Glucose 148   mg/dl


 


Hemoglobin A1c 6.8 H 4.5-5.6  %








Lipid Panel








Test


  4/11/18


07:55 Range/Units


 


 


Triglycerides Level 119  0-150  mg/dl


 


Cholesterol Level 107  0-200  mg/dl


 


HDL Cholesterol 40   mg/dl


 


Cholesterol/HDL Ratio 2.7   


 


LDL Cholesterol, Calculated 43   mg/dl











Medical Emergencies








.


Who to Call and When:





Medical Emergencies:  If at any time you feel your situation is an emergency, 

please call 911 immediately.





.





Non-Emergent Contact


Non-Emergency issues call your:  Primary Care Provider, Cardiologist, Hospital 

Doctor





.


.








"Provider Documentation" section prepared by Darwni Lugo.








.


.





Additional Copies To


Dedra Marcos M.D.; Devin Moreno M.D.; Lombardo,Mark, PA-C; Arely Geiger, PA-C